# Patient Record
Sex: MALE | Race: WHITE | NOT HISPANIC OR LATINO | Employment: UNEMPLOYED
[De-identification: names, ages, dates, MRNs, and addresses within clinical notes are randomized per-mention and may not be internally consistent; named-entity substitution may affect disease eponyms.]

---

## 2018-04-13 ENCOUNTER — RECORDS - HEALTHEAST (OUTPATIENT)
Dept: ADMINISTRATIVE | Facility: OTHER | Age: 58
End: 2018-04-13

## 2018-05-31 ENCOUNTER — RECORDS - HEALTHEAST (OUTPATIENT)
Dept: ADMINISTRATIVE | Facility: OTHER | Age: 58
End: 2018-05-31

## 2018-06-13 ENCOUNTER — OFFICE VISIT - HEALTHEAST (OUTPATIENT)
Dept: FAMILY MEDICINE | Facility: CLINIC | Age: 58
End: 2018-06-13

## 2018-06-13 DIAGNOSIS — M54.50 CHRONIC LOW BACK PAIN: ICD-10-CM

## 2018-06-13 DIAGNOSIS — Z78.9 ALCOHOL USE: ICD-10-CM

## 2018-06-13 DIAGNOSIS — M25.551 HIP PAIN, RIGHT: ICD-10-CM

## 2018-06-13 DIAGNOSIS — Z87.891 PERSONAL HISTORY OF TOBACCO USE, PRESENTING HAZARDS TO HEALTH: ICD-10-CM

## 2018-06-13 DIAGNOSIS — Z12.11 SCREEN FOR COLON CANCER: ICD-10-CM

## 2018-06-13 DIAGNOSIS — M25.562 LEFT KNEE PAIN: ICD-10-CM

## 2018-06-13 DIAGNOSIS — Z83.3 FAMILY HISTORY OF DIABETES MELLITUS: ICD-10-CM

## 2018-06-13 DIAGNOSIS — G89.29 CHRONIC LOW BACK PAIN: ICD-10-CM

## 2018-06-13 DIAGNOSIS — Z71.85 IMMUNIZATION COUNSELING: ICD-10-CM

## 2018-06-13 ASSESSMENT — MIFFLIN-ST. JEOR: SCORE: 1794.68

## 2018-06-18 ENCOUNTER — COMMUNICATION - HEALTHEAST (OUTPATIENT)
Dept: FAMILY MEDICINE | Facility: CLINIC | Age: 58
End: 2018-06-18

## 2018-06-21 ENCOUNTER — AMBULATORY - HEALTHEAST (OUTPATIENT)
Dept: SCHEDULING | Facility: CLINIC | Age: 58
End: 2018-06-21

## 2018-06-21 ENCOUNTER — OFFICE VISIT - HEALTHEAST (OUTPATIENT)
Dept: FAMILY MEDICINE | Facility: CLINIC | Age: 58
End: 2018-06-21

## 2018-06-21 DIAGNOSIS — G89.29 CHRONIC PAIN OF LEFT KNEE: ICD-10-CM

## 2018-06-21 DIAGNOSIS — M79.662 PAIN OF LEFT LOWER LEG: ICD-10-CM

## 2018-06-21 DIAGNOSIS — R94.31 NONSPECIFIC ABNORMAL ELECTROCARDIOGRAM (ECG) (EKG): ICD-10-CM

## 2018-06-21 DIAGNOSIS — Z78.9 ALCOHOL USE: ICD-10-CM

## 2018-06-21 DIAGNOSIS — R00.0 TACHYCARDIA: ICD-10-CM

## 2018-06-21 DIAGNOSIS — M25.562 CHRONIC PAIN OF LEFT KNEE: ICD-10-CM

## 2018-06-21 DIAGNOSIS — M79.661 PAIN OF RIGHT LOWER LEG: ICD-10-CM

## 2018-06-21 DIAGNOSIS — M25.551 HIP PAIN, RIGHT: ICD-10-CM

## 2018-06-21 DIAGNOSIS — K14.8 TONGUE LESION: ICD-10-CM

## 2018-06-21 DIAGNOSIS — E78.5 HYPERLIPIDEMIA LDL GOAL <100: ICD-10-CM

## 2018-06-21 DIAGNOSIS — Z83.3 FAMILY HISTORY OF DIABETES MELLITUS: ICD-10-CM

## 2018-06-21 DIAGNOSIS — Z00.00 ROUTINE GENERAL MEDICAL EXAMINATION AT A HEALTH CARE FACILITY: ICD-10-CM

## 2018-06-21 DIAGNOSIS — Z87.891 PERSONAL HISTORY OF TOBACCO USE, PRESENTING HAZARDS TO HEALTH: ICD-10-CM

## 2018-06-21 DIAGNOSIS — E55.9 VITAMIN D DEFICIENCY: ICD-10-CM

## 2018-06-21 LAB
ALBUMIN SERPL-MCNC: 3.8 G/DL (ref 3.5–5)
ALP SERPL-CCNC: 145 U/L (ref 45–120)
ALT SERPL W P-5'-P-CCNC: 41 U/L (ref 0–45)
ANION GAP SERPL CALCULATED.3IONS-SCNC: 10 MMOL/L (ref 5–18)
AST SERPL W P-5'-P-CCNC: 47 U/L (ref 0–40)
ATRIAL RATE - MUSE: 94 BPM
BILIRUB SERPL-MCNC: 0.7 MG/DL (ref 0–1)
BUN SERPL-MCNC: 9 MG/DL (ref 8–22)
CALCIUM SERPL-MCNC: 9.6 MG/DL (ref 8.5–10.5)
CHLORIDE BLD-SCNC: 104 MMOL/L (ref 98–107)
CHOLEST SERPL-MCNC: 217 MG/DL
CO2 SERPL-SCNC: 26 MMOL/L (ref 22–31)
CREAT SERPL-MCNC: 0.67 MG/DL (ref 0.7–1.3)
DIASTOLIC BLOOD PRESSURE - MUSE: NORMAL MMHG
ERYTHROCYTE [DISTWIDTH] IN BLOOD BY AUTOMATED COUNT: 12.6 % (ref 11–14.5)
FASTING STATUS PATIENT QL REPORTED: YES
GFR SERPL CREATININE-BSD FRML MDRD: >60 ML/MIN/1.73M2
GLUCOSE BLD-MCNC: 86 MG/DL (ref 70–125)
HCT VFR BLD AUTO: 49.3 % (ref 40–54)
HDLC SERPL-MCNC: 34 MG/DL
HGB BLD-MCNC: 16.3 G/DL (ref 14–18)
INTERPRETATION ECG - MUSE: NORMAL
LDLC SERPL CALC-MCNC: 151 MG/DL
MCH RBC QN AUTO: 31.5 PG (ref 27–34)
MCHC RBC AUTO-ENTMCNC: 33 G/DL (ref 32–36)
MCV RBC AUTO: 95 FL (ref 80–100)
P AXIS - MUSE: 59 DEGREES
PLATELET # BLD AUTO: 250 THOU/UL (ref 140–440)
PMV BLD AUTO: 7.3 FL (ref 7–10)
POTASSIUM BLD-SCNC: 5.1 MMOL/L (ref 3.5–5)
PR INTERVAL - MUSE: 164 MS
PROT SERPL-MCNC: 6.9 G/DL (ref 6–8)
PSA SERPL-MCNC: 1.2 NG/ML (ref 0–3.5)
QRS DURATION - MUSE: 100 MS
QT - MUSE: 344 MS
QTC - MUSE: 430 MS
R AXIS - MUSE: -89 DEGREES
RBC # BLD AUTO: 5.17 MILL/UL (ref 4.4–6.2)
SODIUM SERPL-SCNC: 140 MMOL/L (ref 136–145)
SYSTOLIC BLOOD PRESSURE - MUSE: NORMAL MMHG
T AXIS - MUSE: 53 DEGREES
TRIGL SERPL-MCNC: 161 MG/DL
TSH SERPL DL<=0.005 MIU/L-ACNC: 1.97 UIU/ML (ref 0.3–5)
VENTRICULAR RATE- MUSE: 94 BPM
WBC: 6 THOU/UL (ref 4–11)

## 2018-06-21 ASSESSMENT — MIFFLIN-ST. JEOR: SCORE: 1764.63

## 2018-06-22 ENCOUNTER — RECORDS - HEALTHEAST (OUTPATIENT)
Dept: ADMINISTRATIVE | Facility: OTHER | Age: 58
End: 2018-06-22

## 2018-06-22 LAB — 25(OH)D3 SERPL-MCNC: 22.8 NG/ML (ref 30–80)

## 2018-06-26 ENCOUNTER — AMBULATORY - HEALTHEAST (OUTPATIENT)
Dept: LAB | Facility: CLINIC | Age: 58
End: 2018-06-26

## 2018-06-26 ENCOUNTER — COMMUNICATION - HEALTHEAST (OUTPATIENT)
Dept: FAMILY MEDICINE | Facility: CLINIC | Age: 58
End: 2018-06-26

## 2018-06-26 ENCOUNTER — HOSPITAL ENCOUNTER (OUTPATIENT)
Dept: ULTRASOUND IMAGING | Facility: CLINIC | Age: 58
Discharge: HOME | End: 2018-06-26
Attending: FAMILY MEDICINE

## 2018-06-26 DIAGNOSIS — M25.559 HIP PAIN: ICD-10-CM

## 2018-06-26 LAB
BASOPHILS # BLD AUTO: 0 THOU/UL (ref 0–0.2)
BASOPHILS NFR BLD AUTO: 1 % (ref 0–2)
C REACTIVE PROTEIN LHE: 0.7 MG/DL (ref 0–0.8)
EOSINOPHIL # BLD AUTO: 0.2 THOU/UL (ref 0–0.4)
EOSINOPHIL NFR BLD AUTO: 4 % (ref 0–6)
ERYTHROCYTE [DISTWIDTH] IN BLOOD BY AUTOMATED COUNT: 14.7 % (ref 11–14.5)
ERYTHROCYTE [SEDIMENTATION RATE] IN BLOOD BY WESTERGREN METHOD: 10 MM/HR (ref 0–15)
HCT VFR BLD AUTO: 48.9 % (ref 40–54)
HGB BLD-MCNC: 16.7 G/DL (ref 14–18)
LYMPHOCYTES # BLD AUTO: 1.7 THOU/UL (ref 0.8–4.4)
LYMPHOCYTES NFR BLD AUTO: 35 % (ref 20–40)
MCH RBC QN AUTO: 31.9 PG (ref 27–34)
MCHC RBC AUTO-ENTMCNC: 34.2 G/DL (ref 32–36)
MCV RBC AUTO: 93 FL (ref 80–100)
MONOCYTES # BLD AUTO: 0.4 THOU/UL (ref 0–0.9)
MONOCYTES NFR BLD AUTO: 8 % (ref 2–10)
NEUTROPHILS # BLD AUTO: 2.4 THOU/UL (ref 2–7.7)
NEUTROPHILS NFR BLD AUTO: 53 % (ref 50–70)
PLATELET # BLD AUTO: 268 THOU/UL (ref 140–440)
PMV BLD AUTO: 9.5 FL (ref 8.5–12.5)
RBC # BLD AUTO: 5.24 MILL/UL (ref 4.4–6.2)
WBC: 4.7 THOU/UL (ref 4–11)

## 2018-07-05 ENCOUNTER — COMMUNICATION - HEALTHEAST (OUTPATIENT)
Dept: FAMILY MEDICINE | Facility: CLINIC | Age: 58
End: 2018-07-05

## 2018-07-06 ENCOUNTER — COMMUNICATION - HEALTHEAST (OUTPATIENT)
Dept: FAMILY MEDICINE | Facility: CLINIC | Age: 58
End: 2018-07-06

## 2018-07-06 DIAGNOSIS — Z72.0 TOBACCO ABUSE: ICD-10-CM

## 2018-07-11 ENCOUNTER — OFFICE VISIT - HEALTHEAST (OUTPATIENT)
Dept: FAMILY MEDICINE | Facility: CLINIC | Age: 58
End: 2018-07-11

## 2018-07-11 ENCOUNTER — COMMUNICATION - HEALTHEAST (OUTPATIENT)
Dept: SCHEDULING | Facility: CLINIC | Age: 58
End: 2018-07-11

## 2018-07-11 DIAGNOSIS — L02.211 CUTANEOUS ABSCESS OF ABDOMINAL WALL: ICD-10-CM

## 2018-07-13 ENCOUNTER — OFFICE VISIT - HEALTHEAST (OUTPATIENT)
Dept: CARDIOLOGY | Facility: CLINIC | Age: 58
End: 2018-07-13

## 2018-07-13 DIAGNOSIS — E78.2 MIXED HYPERLIPIDEMIA: ICD-10-CM

## 2018-07-13 DIAGNOSIS — R94.31 NONSPECIFIC ABNORMAL ELECTROCARDIOGRAM (ECG) (EKG): ICD-10-CM

## 2018-07-13 DIAGNOSIS — Z01.818 PREOPERATIVE EXAMINATION: ICD-10-CM

## 2018-07-13 LAB — BACTERIA SPEC CULT: NO GROWTH

## 2018-07-13 ASSESSMENT — MIFFLIN-ST. JEOR: SCORE: 1791.84

## 2018-07-16 ENCOUNTER — OFFICE VISIT - HEALTHEAST (OUTPATIENT)
Dept: FAMILY MEDICINE | Facility: CLINIC | Age: 58
End: 2018-07-16

## 2018-07-16 DIAGNOSIS — E78.5 HYPERLIPIDEMIA LDL GOAL <100: ICD-10-CM

## 2018-07-16 DIAGNOSIS — G89.29 CHRONIC PAIN OF LEFT KNEE: ICD-10-CM

## 2018-07-16 DIAGNOSIS — M25.562 CHRONIC PAIN OF LEFT KNEE: ICD-10-CM

## 2018-07-16 DIAGNOSIS — M25.551 HIP PAIN, RIGHT: ICD-10-CM

## 2018-07-16 DIAGNOSIS — L02.211 CUTANEOUS ABSCESS OF ABDOMINAL WALL: ICD-10-CM

## 2018-09-04 ENCOUNTER — OFFICE VISIT - HEALTHEAST (OUTPATIENT)
Dept: FAMILY MEDICINE | Facility: CLINIC | Age: 58
End: 2018-09-04

## 2018-09-04 DIAGNOSIS — Z87.891 PERSONAL HISTORY OF TOBACCO USE, PRESENTING HAZARDS TO HEALTH: ICD-10-CM

## 2018-09-04 DIAGNOSIS — M25.562 CHRONIC PAIN OF LEFT KNEE: ICD-10-CM

## 2018-09-04 DIAGNOSIS — Z83.3 FAMILY HISTORY OF DIABETES MELLITUS: ICD-10-CM

## 2018-09-04 DIAGNOSIS — G89.29 CHRONIC PAIN OF LEFT KNEE: ICD-10-CM

## 2018-09-04 DIAGNOSIS — Z01.818 PREOP GENERAL PHYSICAL EXAM: ICD-10-CM

## 2018-09-04 DIAGNOSIS — M16.11 PRIMARY OSTEOARTHRITIS OF RIGHT HIP: ICD-10-CM

## 2018-09-04 LAB
ANION GAP SERPL CALCULATED.3IONS-SCNC: 9 MMOL/L (ref 5–18)
ATRIAL RATE - MUSE: 78 BPM
BUN SERPL-MCNC: 9 MG/DL (ref 8–22)
CALCIUM SERPL-MCNC: 9.9 MG/DL (ref 8.5–10.5)
CHLORIDE BLD-SCNC: 104 MMOL/L (ref 98–107)
CO2 SERPL-SCNC: 26 MMOL/L (ref 22–31)
CREAT SERPL-MCNC: 0.68 MG/DL (ref 0.7–1.3)
DIASTOLIC BLOOD PRESSURE - MUSE: NORMAL MMHG
ERYTHROCYTE [DISTWIDTH] IN BLOOD BY AUTOMATED COUNT: 14 % (ref 11–14.5)
GFR SERPL CREATININE-BSD FRML MDRD: >60 ML/MIN/1.73M2
GLUCOSE BLD-MCNC: 95 MG/DL (ref 70–125)
HCT VFR BLD AUTO: 43.3 % (ref 40–54)
HGB BLD-MCNC: 14.5 G/DL (ref 14–18)
INR PPP: 0.98 (ref 0.9–1.1)
INTERPRETATION ECG - MUSE: NORMAL
MCH RBC QN AUTO: 30.9 PG (ref 27–34)
MCHC RBC AUTO-ENTMCNC: 33.6 G/DL (ref 32–36)
MCV RBC AUTO: 92 FL (ref 80–100)
P AXIS - MUSE: 57 DEGREES
PLATELET # BLD AUTO: 242 THOU/UL (ref 140–440)
PMV BLD AUTO: 6.8 FL (ref 7–10)
POTASSIUM BLD-SCNC: 4.7 MMOL/L (ref 3.5–5)
PR INTERVAL - MUSE: 172 MS
QRS DURATION - MUSE: 112 MS
QT - MUSE: 374 MS
QTC - MUSE: 426 MS
R AXIS - MUSE: -63 DEGREES
RBC # BLD AUTO: 4.7 MILL/UL (ref 4.4–6.2)
SODIUM SERPL-SCNC: 139 MMOL/L (ref 136–145)
SYSTOLIC BLOOD PRESSURE - MUSE: NORMAL MMHG
T AXIS - MUSE: 47 DEGREES
VENTRICULAR RATE- MUSE: 78 BPM
WBC: 4.9 THOU/UL (ref 4–11)

## 2018-09-04 ASSESSMENT — MIFFLIN-ST. JEOR: SCORE: 1844.58

## 2018-09-13 ASSESSMENT — MIFFLIN-ST. JEOR: SCORE: 1844.58

## 2018-09-17 ENCOUNTER — ANESTHESIA - HEALTHEAST (OUTPATIENT)
Dept: SURGERY | Facility: CLINIC | Age: 58
End: 2018-09-17

## 2018-09-18 ENCOUNTER — SURGERY - HEALTHEAST (OUTPATIENT)
Dept: SURGERY | Facility: CLINIC | Age: 58
End: 2018-09-18

## 2018-09-18 ASSESSMENT — MIFFLIN-ST. JEOR
SCORE: 1804.04
SCORE: 1804.04

## 2018-09-25 ENCOUNTER — COMMUNICATION - HEALTHEAST (OUTPATIENT)
Dept: CARE COORDINATION | Facility: CLINIC | Age: 58
End: 2018-09-25

## 2018-11-08 ENCOUNTER — COMMUNICATION - HEALTHEAST (OUTPATIENT)
Dept: SCHEDULING | Facility: CLINIC | Age: 58
End: 2018-11-08

## 2018-11-27 ENCOUNTER — RECORDS - HEALTHEAST (OUTPATIENT)
Dept: ADMINISTRATIVE | Facility: OTHER | Age: 58
End: 2018-11-27

## 2018-11-30 ENCOUNTER — COMMUNICATION - HEALTHEAST (OUTPATIENT)
Dept: FAMILY MEDICINE | Facility: CLINIC | Age: 58
End: 2018-11-30

## 2018-11-30 DIAGNOSIS — G89.29 CHRONIC PAIN OF LEFT KNEE: ICD-10-CM

## 2018-11-30 DIAGNOSIS — M25.562 CHRONIC PAIN OF LEFT KNEE: ICD-10-CM

## 2018-11-30 DIAGNOSIS — M25.551 HIP PAIN, RIGHT: ICD-10-CM

## 2019-01-14 ENCOUNTER — OFFICE VISIT - HEALTHEAST (OUTPATIENT)
Dept: FAMILY MEDICINE | Facility: CLINIC | Age: 59
End: 2019-01-14

## 2019-01-14 DIAGNOSIS — M17.12 OSTEOARTHRITIS OF LEFT KNEE, UNSPECIFIED OSTEOARTHRITIS TYPE: ICD-10-CM

## 2019-01-14 DIAGNOSIS — Z72.0 TOBACCO USE: ICD-10-CM

## 2019-01-14 DIAGNOSIS — Z01.818 PREOP GENERAL PHYSICAL EXAM: ICD-10-CM

## 2019-01-14 DIAGNOSIS — M16.11 PRIMARY OSTEOARTHRITIS OF RIGHT HIP: ICD-10-CM

## 2019-01-14 DIAGNOSIS — E78.5 HYPERLIPIDEMIA LDL GOAL <100: ICD-10-CM

## 2019-01-14 DIAGNOSIS — Z83.3 FAMILY HISTORY OF DIABETES MELLITUS: ICD-10-CM

## 2019-01-14 DIAGNOSIS — Z78.9 ALCOHOL USE: ICD-10-CM

## 2019-01-14 LAB
ANION GAP SERPL CALCULATED.3IONS-SCNC: 11 MMOL/L (ref 5–18)
ATRIAL RATE - MUSE: 87 BPM
BUN SERPL-MCNC: 16 MG/DL (ref 8–22)
CALCIUM SERPL-MCNC: 9.7 MG/DL (ref 8.5–10.5)
CHLORIDE BLD-SCNC: 103 MMOL/L (ref 98–107)
CO2 SERPL-SCNC: 24 MMOL/L (ref 22–31)
CREAT SERPL-MCNC: 0.74 MG/DL (ref 0.7–1.3)
DIASTOLIC BLOOD PRESSURE - MUSE: NORMAL MMHG
ERYTHROCYTE [DISTWIDTH] IN BLOOD BY AUTOMATED COUNT: 12.9 % (ref 11–14.5)
GFR SERPL CREATININE-BSD FRML MDRD: >60 ML/MIN/1.73M2
GLUCOSE BLD-MCNC: 99 MG/DL (ref 70–125)
HCT VFR BLD AUTO: 42.3 % (ref 40–54)
HGB BLD-MCNC: 14 G/DL (ref 14–18)
INR PPP: 0.96 (ref 0.9–1.1)
INTERPRETATION ECG - MUSE: NORMAL
MCH RBC QN AUTO: 30.7 PG (ref 27–34)
MCHC RBC AUTO-ENTMCNC: 33.1 G/DL (ref 32–36)
MCV RBC AUTO: 93 FL (ref 80–100)
P AXIS - MUSE: 56 DEGREES
PLATELET # BLD AUTO: 278 THOU/UL (ref 140–440)
PMV BLD AUTO: 7 FL (ref 7–10)
POTASSIUM BLD-SCNC: 4.6 MMOL/L (ref 3.5–5)
PR INTERVAL - MUSE: 164 MS
QRS DURATION - MUSE: 104 MS
QT - MUSE: 356 MS
QTC - MUSE: 428 MS
R AXIS - MUSE: -78 DEGREES
RBC # BLD AUTO: 4.55 MILL/UL (ref 4.4–6.2)
SODIUM SERPL-SCNC: 138 MMOL/L (ref 136–145)
SYSTOLIC BLOOD PRESSURE - MUSE: NORMAL MMHG
T AXIS - MUSE: 54 DEGREES
VENTRICULAR RATE- MUSE: 87 BPM
WBC: 4.9 THOU/UL (ref 4–11)

## 2019-01-14 ASSESSMENT — MIFFLIN-ST. JEOR: SCORE: 1896.74

## 2019-02-04 ASSESSMENT — MIFFLIN-ST. JEOR: SCORE: 1896.74

## 2019-02-06 ENCOUNTER — ANESTHESIA - HEALTHEAST (OUTPATIENT)
Dept: SURGERY | Facility: CLINIC | Age: 59
End: 2019-02-06

## 2019-02-06 ENCOUNTER — SURGERY - HEALTHEAST (OUTPATIENT)
Dept: SURGERY | Facility: CLINIC | Age: 59
End: 2019-02-06

## 2019-02-06 ASSESSMENT — MIFFLIN-ST. JEOR
SCORE: 1864.98
SCORE: 1861.87

## 2019-02-12 ENCOUNTER — COMMUNICATION - HEALTHEAST (OUTPATIENT)
Dept: CARE COORDINATION | Facility: CLINIC | Age: 59
End: 2019-02-12

## 2019-02-18 ENCOUNTER — RECORDS - HEALTHEAST (OUTPATIENT)
Dept: ADMINISTRATIVE | Facility: OTHER | Age: 59
End: 2019-02-18

## 2019-02-19 ENCOUNTER — OFFICE VISIT - HEALTHEAST (OUTPATIENT)
Dept: FAMILY MEDICINE | Facility: CLINIC | Age: 59
End: 2019-02-19

## 2019-02-19 DIAGNOSIS — Z09 HOSPITAL DISCHARGE FOLLOW-UP: ICD-10-CM

## 2019-02-19 DIAGNOSIS — D64.9 ANEMIA, UNSPECIFIED TYPE: ICD-10-CM

## 2019-02-19 DIAGNOSIS — M17.12 PRIMARY OSTEOARTHRITIS OF LEFT KNEE: ICD-10-CM

## 2019-02-19 LAB
ERYTHROCYTE [DISTWIDTH] IN BLOOD BY AUTOMATED COUNT: 14.1 % (ref 11–14.5)
HCT VFR BLD AUTO: 30.6 % (ref 40–54)
HGB BLD-MCNC: 10.2 G/DL (ref 14–18)
MCH RBC QN AUTO: 30.3 PG (ref 27–34)
MCHC RBC AUTO-ENTMCNC: 33.3 G/DL (ref 32–36)
MCV RBC AUTO: 91 FL (ref 80–100)
PLATELET # BLD AUTO: 450 THOU/UL (ref 140–440)
PMV BLD AUTO: 6 FL (ref 7–10)
RBC # BLD AUTO: 3.36 MILL/UL (ref 4.4–6.2)
WBC: 6.8 THOU/UL (ref 4–11)

## 2019-02-19 ASSESSMENT — MIFFLIN-ST. JEOR: SCORE: 1892.2

## 2019-02-20 ENCOUNTER — COMMUNICATION - HEALTHEAST (OUTPATIENT)
Dept: FAMILY MEDICINE | Facility: CLINIC | Age: 59
End: 2019-02-20

## 2019-03-04 ENCOUNTER — RECORDS - HEALTHEAST (OUTPATIENT)
Dept: ADMINISTRATIVE | Facility: OTHER | Age: 59
End: 2019-03-04

## 2019-04-23 ENCOUNTER — COMMUNICATION - HEALTHEAST (OUTPATIENT)
Dept: FAMILY MEDICINE | Facility: CLINIC | Age: 59
End: 2019-04-23

## 2019-04-23 ENCOUNTER — RECORDS - HEALTHEAST (OUTPATIENT)
Dept: FAMILY MEDICINE | Facility: CLINIC | Age: 59
End: 2019-04-23

## 2019-04-23 DIAGNOSIS — M17.12 PRIMARY OSTEOARTHRITIS OF LEFT KNEE: ICD-10-CM

## 2019-09-10 ENCOUNTER — OFFICE VISIT - HEALTHEAST (OUTPATIENT)
Dept: FAMILY MEDICINE | Facility: CLINIC | Age: 59
End: 2019-09-10

## 2019-09-10 DIAGNOSIS — M16.12 PRIMARY OSTEOARTHRITIS OF LEFT HIP: ICD-10-CM

## 2019-09-10 DIAGNOSIS — Z87.891 PERSONAL HISTORY OF TOBACCO USE, PRESENTING HAZARDS TO HEALTH: ICD-10-CM

## 2019-09-10 DIAGNOSIS — Z83.3 FAMILY HISTORY OF DIABETES MELLITUS: ICD-10-CM

## 2019-09-10 DIAGNOSIS — Z01.818 PREOP GENERAL PHYSICAL EXAM: ICD-10-CM

## 2019-09-10 DIAGNOSIS — E78.5 HYPERLIPIDEMIA LDL GOAL <100: ICD-10-CM

## 2019-09-10 LAB
ALBUMIN SERPL-MCNC: 3.9 G/DL (ref 3.5–5)
ALP SERPL-CCNC: 131 U/L (ref 45–120)
ALT SERPL W P-5'-P-CCNC: 24 U/L (ref 0–45)
ANION GAP SERPL CALCULATED.3IONS-SCNC: 10 MMOL/L (ref 5–18)
AST SERPL W P-5'-P-CCNC: 19 U/L (ref 0–40)
ATRIAL RATE - MUSE: 89 BPM
BILIRUB SERPL-MCNC: 0.3 MG/DL (ref 0–1)
BUN SERPL-MCNC: 16 MG/DL (ref 8–22)
CALCIUM SERPL-MCNC: 9.7 MG/DL (ref 8.5–10.5)
CHLORIDE BLD-SCNC: 103 MMOL/L (ref 98–107)
CHOLEST SERPL-MCNC: 196 MG/DL
CO2 SERPL-SCNC: 26 MMOL/L (ref 22–31)
CREAT SERPL-MCNC: 0.73 MG/DL (ref 0.7–1.3)
DIASTOLIC BLOOD PRESSURE - MUSE: NORMAL MMHG
ERYTHROCYTE [DISTWIDTH] IN BLOOD BY AUTOMATED COUNT: 13.4 % (ref 11–14.5)
FASTING STATUS PATIENT QL REPORTED: NO
GFR SERPL CREATININE-BSD FRML MDRD: >60 ML/MIN/1.73M2
GLUCOSE BLD-MCNC: 96 MG/DL (ref 70–125)
HCT VFR BLD AUTO: 44.3 % (ref 40–54)
HDLC SERPL-MCNC: 35 MG/DL
HGB BLD-MCNC: 15.3 G/DL (ref 14–18)
INR PPP: 0.94 (ref 0.9–1.1)
INTERPRETATION ECG - MUSE: NORMAL
LDLC SERPL CALC-MCNC: 136 MG/DL
MCH RBC QN AUTO: 32.9 PG (ref 27–34)
MCHC RBC AUTO-ENTMCNC: 34.5 G/DL (ref 32–36)
MCV RBC AUTO: 95 FL (ref 80–100)
P AXIS - MUSE: 60 DEGREES
PLATELET # BLD AUTO: 315 THOU/UL (ref 140–440)
PMV BLD AUTO: 6.9 FL (ref 7–10)
POTASSIUM BLD-SCNC: 4.6 MMOL/L (ref 3.5–5)
PR INTERVAL - MUSE: 164 MS
PROT SERPL-MCNC: 6.9 G/DL (ref 6–8)
QRS DURATION - MUSE: 102 MS
QT - MUSE: 352 MS
QTC - MUSE: 428 MS
R AXIS - MUSE: -77 DEGREES
RBC # BLD AUTO: 4.64 MILL/UL (ref 4.4–6.2)
SODIUM SERPL-SCNC: 139 MMOL/L (ref 136–145)
SYSTOLIC BLOOD PRESSURE - MUSE: NORMAL MMHG
T AXIS - MUSE: 50 DEGREES
TRIGL SERPL-MCNC: 123 MG/DL
VENTRICULAR RATE- MUSE: 89 BPM
WBC: 4.9 THOU/UL (ref 4–11)

## 2019-09-10 ASSESSMENT — MIFFLIN-ST. JEOR: SCORE: 1855.91

## 2019-09-11 ENCOUNTER — COMMUNICATION - HEALTHEAST (OUTPATIENT)
Dept: FAMILY MEDICINE | Facility: CLINIC | Age: 59
End: 2019-09-11

## 2019-09-12 ASSESSMENT — MIFFLIN-ST. JEOR: SCORE: 1871.79

## 2019-09-14 ENCOUNTER — COMMUNICATION - HEALTHEAST (OUTPATIENT)
Dept: FAMILY MEDICINE | Facility: CLINIC | Age: 59
End: 2019-09-14

## 2019-09-14 DIAGNOSIS — M17.12 PRIMARY OSTEOARTHRITIS OF LEFT KNEE: ICD-10-CM

## 2019-10-01 ASSESSMENT — MIFFLIN-ST. JEOR: SCORE: 1840.04

## 2019-10-02 ENCOUNTER — ANESTHESIA - HEALTHEAST (OUTPATIENT)
Dept: SURGERY | Facility: CLINIC | Age: 59
End: 2019-10-02

## 2019-10-02 ENCOUNTER — SURGERY - HEALTHEAST (OUTPATIENT)
Dept: SURGERY | Facility: CLINIC | Age: 59
End: 2019-10-02

## 2019-10-02 ASSESSMENT — MIFFLIN-ST. JEOR: SCORE: 1818.72

## 2019-10-03 ASSESSMENT — MIFFLIN-ST. JEOR: SCORE: 1861.36

## 2019-10-08 ENCOUNTER — COMMUNICATION - HEALTHEAST (OUTPATIENT)
Dept: CARE COORDINATION | Facility: CLINIC | Age: 59
End: 2019-10-08

## 2019-10-11 ENCOUNTER — COMMUNICATION - HEALTHEAST (OUTPATIENT)
Dept: FAMILY MEDICINE | Facility: CLINIC | Age: 59
End: 2019-10-11

## 2019-10-11 DIAGNOSIS — M17.12 PRIMARY OSTEOARTHRITIS OF LEFT KNEE: ICD-10-CM

## 2019-10-14 ENCOUNTER — OFFICE VISIT - HEALTHEAST (OUTPATIENT)
Dept: FAMILY MEDICINE | Facility: CLINIC | Age: 59
End: 2019-10-14

## 2019-10-14 DIAGNOSIS — Z96.642 HISTORY OF LEFT HIP REPLACEMENT: ICD-10-CM

## 2019-10-14 DIAGNOSIS — Z09 HOSPITAL DISCHARGE FOLLOW-UP: ICD-10-CM

## 2019-10-14 DIAGNOSIS — H53.8 BLURRY VISION, BILATERAL: ICD-10-CM

## 2019-10-15 ENCOUNTER — RECORDS - HEALTHEAST (OUTPATIENT)
Dept: ADMINISTRATIVE | Facility: OTHER | Age: 59
End: 2019-10-15

## 2019-10-21 ENCOUNTER — RECORDS - HEALTHEAST (OUTPATIENT)
Dept: ADMINISTRATIVE | Facility: OTHER | Age: 59
End: 2019-10-21

## 2019-10-29 ENCOUNTER — RECORDS - HEALTHEAST (OUTPATIENT)
Dept: ADMINISTRATIVE | Facility: OTHER | Age: 59
End: 2019-10-29

## 2019-10-31 ENCOUNTER — RECORDS - HEALTHEAST (OUTPATIENT)
Dept: ADMINISTRATIVE | Facility: OTHER | Age: 59
End: 2019-10-31

## 2019-11-04 ENCOUNTER — RECORDS - HEALTHEAST (OUTPATIENT)
Dept: ADMINISTRATIVE | Facility: OTHER | Age: 59
End: 2019-11-04

## 2019-11-13 ENCOUNTER — OFFICE VISIT - HEALTHEAST (OUTPATIENT)
Dept: FAMILY MEDICINE | Facility: CLINIC | Age: 59
End: 2019-11-13

## 2019-11-13 DIAGNOSIS — M75.102 TEAR OF LEFT ROTATOR CUFF, UNSPECIFIED TEAR EXTENT, UNSPECIFIED WHETHER TRAUMATIC: ICD-10-CM

## 2019-11-13 DIAGNOSIS — Z01.818 PREOP GENERAL PHYSICAL EXAM: ICD-10-CM

## 2019-11-13 DIAGNOSIS — M17.12 PRIMARY OSTEOARTHRITIS OF LEFT KNEE: ICD-10-CM

## 2019-11-13 LAB
ANION GAP SERPL CALCULATED.3IONS-SCNC: 9 MMOL/L (ref 5–18)
ATRIAL RATE - MUSE: 97 BPM
BUN SERPL-MCNC: 13 MG/DL (ref 8–22)
CALCIUM SERPL-MCNC: 8.9 MG/DL (ref 8.5–10.5)
CHLORIDE BLD-SCNC: 105 MMOL/L (ref 98–107)
CO2 SERPL-SCNC: 24 MMOL/L (ref 22–31)
CREAT SERPL-MCNC: 0.72 MG/DL (ref 0.7–1.3)
DIASTOLIC BLOOD PRESSURE - MUSE: NORMAL
ERYTHROCYTE [DISTWIDTH] IN BLOOD BY AUTOMATED COUNT: 13.3 % (ref 11–14.5)
GFR SERPL CREATININE-BSD FRML MDRD: >60 ML/MIN/1.73M2
GLUCOSE BLD-MCNC: 88 MG/DL (ref 70–125)
HCT VFR BLD AUTO: 37.6 % (ref 40–54)
HGB BLD-MCNC: 12.8 G/DL (ref 14–18)
INTERPRETATION ECG - MUSE: NORMAL
MCH RBC QN AUTO: 32.5 PG (ref 27–34)
MCHC RBC AUTO-ENTMCNC: 34.1 G/DL (ref 32–36)
MCV RBC AUTO: 95 FL (ref 80–100)
P AXIS - MUSE: 65 DEGREES
PLATELET # BLD AUTO: 300 THOU/UL (ref 140–440)
PMV BLD AUTO: 6.5 FL (ref 7–10)
POTASSIUM BLD-SCNC: 4 MMOL/L (ref 3.5–5)
PR INTERVAL - MUSE: 172 MS
QRS DURATION - MUSE: 108 MS
QT - MUSE: 348 MS
QTC - MUSE: 441 MS
R AXIS - MUSE: -74 DEGREES
RBC # BLD AUTO: 3.95 MILL/UL (ref 4.4–6.2)
SODIUM SERPL-SCNC: 138 MMOL/L (ref 136–145)
SYSTOLIC BLOOD PRESSURE - MUSE: NORMAL
T AXIS - MUSE: 59 DEGREES
VENTRICULAR RATE- MUSE: 97 BPM
WBC: 4.5 THOU/UL (ref 4–11)

## 2019-11-13 ASSESSMENT — MIFFLIN-ST. JEOR: SCORE: 1921.69

## 2019-11-14 ENCOUNTER — COMMUNICATION - HEALTHEAST (OUTPATIENT)
Dept: FAMILY MEDICINE | Facility: CLINIC | Age: 59
End: 2019-11-14

## 2019-11-22 ENCOUNTER — RECORDS - HEALTHEAST (OUTPATIENT)
Dept: ADMINISTRATIVE | Facility: OTHER | Age: 59
End: 2019-11-22

## 2019-12-04 ENCOUNTER — COMMUNICATION - HEALTHEAST (OUTPATIENT)
Dept: FAMILY MEDICINE | Facility: CLINIC | Age: 59
End: 2019-12-04

## 2019-12-04 DIAGNOSIS — E56.9 VITAMIN DEFICIENCY: ICD-10-CM

## 2019-12-09 ENCOUNTER — RECORDS - HEALTHEAST (OUTPATIENT)
Dept: ADMINISTRATIVE | Facility: OTHER | Age: 59
End: 2019-12-09

## 2019-12-14 ENCOUNTER — COMMUNICATION - HEALTHEAST (OUTPATIENT)
Dept: FAMILY MEDICINE | Facility: CLINIC | Age: 59
End: 2019-12-14

## 2019-12-14 DIAGNOSIS — M17.12 PRIMARY OSTEOARTHRITIS OF LEFT KNEE: ICD-10-CM

## 2020-01-07 ENCOUNTER — RECORDS - HEALTHEAST (OUTPATIENT)
Dept: ADMINISTRATIVE | Facility: OTHER | Age: 60
End: 2020-01-07

## 2020-02-03 ENCOUNTER — RECORDS - HEALTHEAST (OUTPATIENT)
Dept: ADMINISTRATIVE | Facility: OTHER | Age: 60
End: 2020-02-03

## 2020-03-12 ENCOUNTER — RECORDS - HEALTHEAST (OUTPATIENT)
Dept: ADMINISTRATIVE | Facility: OTHER | Age: 60
End: 2020-03-12

## 2020-03-16 ENCOUNTER — RECORDS - HEALTHEAST (OUTPATIENT)
Dept: ADMINISTRATIVE | Facility: OTHER | Age: 60
End: 2020-03-16

## 2020-05-11 ENCOUNTER — RECORDS - HEALTHEAST (OUTPATIENT)
Dept: ADMINISTRATIVE | Facility: OTHER | Age: 60
End: 2020-05-11

## 2020-08-14 ENCOUNTER — COMMUNICATION - HEALTHEAST (OUTPATIENT)
Dept: FAMILY MEDICINE | Facility: CLINIC | Age: 60
End: 2020-08-14

## 2020-08-14 DIAGNOSIS — E56.9 VITAMIN DEFICIENCY: ICD-10-CM

## 2020-08-14 DIAGNOSIS — M17.12 PRIMARY OSTEOARTHRITIS OF LEFT KNEE: ICD-10-CM

## 2020-08-24 ENCOUNTER — RECORDS - HEALTHEAST (OUTPATIENT)
Dept: ADMINISTRATIVE | Facility: OTHER | Age: 60
End: 2020-08-24

## 2020-08-31 ENCOUNTER — COMMUNICATION - HEALTHEAST (OUTPATIENT)
Dept: FAMILY MEDICINE | Facility: CLINIC | Age: 60
End: 2020-08-31

## 2020-09-18 ENCOUNTER — OFFICE VISIT - HEALTHEAST (OUTPATIENT)
Dept: FAMILY MEDICINE | Facility: CLINIC | Age: 60
End: 2020-09-18

## 2020-09-18 ENCOUNTER — COMMUNICATION - HEALTHEAST (OUTPATIENT)
Dept: FAMILY MEDICINE | Facility: CLINIC | Age: 60
End: 2020-09-18

## 2020-09-18 DIAGNOSIS — M17.12 PRIMARY OSTEOARTHRITIS OF LEFT KNEE: ICD-10-CM

## 2020-09-18 DIAGNOSIS — M17.10 ARTHRITIS OF KNEE: ICD-10-CM

## 2020-09-18 DIAGNOSIS — Z01.818 PREOP EXAMINATION: ICD-10-CM

## 2020-09-18 LAB
ATRIAL RATE - MUSE: 90 BPM
DIASTOLIC BLOOD PRESSURE - MUSE: NORMAL
HGB BLD-MCNC: 14.9 G/DL (ref 14–18)
INTERPRETATION ECG - MUSE: NORMAL
P AXIS - MUSE: 63 DEGREES
POTASSIUM BLD-SCNC: 4.2 MMOL/L (ref 3.5–5)
PR INTERVAL - MUSE: 164 MS
QRS DURATION - MUSE: 108 MS
QT - MUSE: 354 MS
QTC - MUSE: 433 MS
R AXIS - MUSE: -84 DEGREES
SYSTOLIC BLOOD PRESSURE - MUSE: NORMAL
T AXIS - MUSE: 56 DEGREES
VENTRICULAR RATE- MUSE: 90 BPM

## 2020-09-18 ASSESSMENT — MIFFLIN-ST. JEOR: SCORE: 1844

## 2020-09-22 ENCOUNTER — COMMUNICATION - HEALTHEAST (OUTPATIENT)
Dept: FAMILY MEDICINE | Facility: CLINIC | Age: 60
End: 2020-09-22

## 2020-10-05 ENCOUNTER — RECORDS - HEALTHEAST (OUTPATIENT)
Dept: LAB | Facility: HOSPITAL | Age: 60
End: 2020-10-05

## 2020-10-05 LAB
ANION GAP SERPL CALCULATED.3IONS-SCNC: 8 MMOL/L (ref 5–18)
BASOPHILS # BLD AUTO: 0.1 THOU/UL (ref 0–0.2)
BASOPHILS NFR BLD AUTO: 1 % (ref 0–2)
BUN SERPL-MCNC: 15 MG/DL (ref 8–22)
CALCIUM SERPL-MCNC: 9.6 MG/DL (ref 8.5–10.5)
CHLORIDE BLD-SCNC: 102 MMOL/L (ref 98–107)
CO2 SERPL-SCNC: 30 MMOL/L (ref 22–31)
CREAT SERPL-MCNC: 0.7 MG/DL (ref 0.7–1.3)
EOSINOPHIL # BLD AUTO: 0.4 THOU/UL (ref 0–0.4)
EOSINOPHIL NFR BLD AUTO: 5 % (ref 0–6)
ERYTHROCYTE [DISTWIDTH] IN BLOOD BY AUTOMATED COUNT: 13.9 % (ref 11–14.5)
GFR SERPL CREATININE-BSD FRML MDRD: >60 ML/MIN/1.73M2
GLUCOSE BLD-MCNC: 86 MG/DL (ref 70–125)
HCT VFR BLD AUTO: 45.5 % (ref 40–54)
HGB BLD-MCNC: 15.4 G/DL (ref 14–18)
IMM GRANULOCYTES # BLD: 0 THOU/UL
IMM GRANULOCYTES NFR BLD: 0 %
LYMPHOCYTES # BLD AUTO: 2 THOU/UL (ref 0.8–4.4)
LYMPHOCYTES NFR BLD AUTO: 27 % (ref 20–40)
MCH RBC QN AUTO: 32.8 PG (ref 27–34)
MCHC RBC AUTO-ENTMCNC: 33.8 G/DL (ref 32–36)
MCV RBC AUTO: 97 FL (ref 80–100)
MONOCYTES # BLD AUTO: 0.5 THOU/UL (ref 0–0.9)
MONOCYTES NFR BLD AUTO: 7 % (ref 2–10)
NEUTROPHILS # BLD AUTO: 4.5 THOU/UL (ref 2–7.7)
NEUTROPHILS NFR BLD AUTO: 60 % (ref 50–70)
PLATELET # BLD AUTO: 290 THOU/UL (ref 140–440)
PMV BLD AUTO: 8.8 FL (ref 8.5–12.5)
POTASSIUM BLD-SCNC: 4.4 MMOL/L (ref 3.5–5)
RBC # BLD AUTO: 4.7 MILL/UL (ref 4.4–6.2)
SODIUM SERPL-SCNC: 140 MMOL/L (ref 136–145)
WBC: 7.4 THOU/UL (ref 4–11)

## 2020-10-07 ENCOUNTER — AMBULATORY - HEALTHEAST (OUTPATIENT)
Dept: SURGERY | Facility: CLINIC | Age: 60
End: 2020-10-07

## 2020-10-07 ENCOUNTER — COMMUNICATION - HEALTHEAST (OUTPATIENT)
Dept: FAMILY MEDICINE | Facility: CLINIC | Age: 60
End: 2020-10-07

## 2020-10-07 DIAGNOSIS — Z11.59 ENCOUNTER FOR SCREENING FOR OTHER VIRAL DISEASES: ICD-10-CM

## 2020-10-08 ENCOUNTER — COMMUNICATION - HEALTHEAST (OUTPATIENT)
Dept: FAMILY MEDICINE | Facility: CLINIC | Age: 60
End: 2020-10-08

## 2020-10-15 ENCOUNTER — AMBULATORY - HEALTHEAST (OUTPATIENT)
Dept: SURGERY | Facility: CLINIC | Age: 60
End: 2020-10-15

## 2020-10-15 ENCOUNTER — COMMUNICATION - HEALTHEAST (OUTPATIENT)
Dept: FAMILY MEDICINE | Facility: CLINIC | Age: 60
End: 2020-10-15

## 2020-10-15 DIAGNOSIS — Z11.59 ENCOUNTER FOR SCREENING FOR OTHER VIRAL DISEASES: ICD-10-CM

## 2020-10-15 ASSESSMENT — MIFFLIN-ST. JEOR: SCORE: 1844

## 2020-10-31 ENCOUNTER — COMMUNICATION - HEALTHEAST (OUTPATIENT)
Dept: FAMILY MEDICINE | Facility: CLINIC | Age: 60
End: 2020-10-31

## 2020-10-31 DIAGNOSIS — M17.12 PRIMARY OSTEOARTHRITIS OF LEFT KNEE: ICD-10-CM

## 2020-11-02 ENCOUNTER — AMBULATORY - HEALTHEAST (OUTPATIENT)
Dept: LAB | Facility: CLINIC | Age: 60
End: 2020-11-02

## 2020-11-02 ENCOUNTER — OFFICE VISIT - HEALTHEAST (OUTPATIENT)
Dept: FAMILY MEDICINE | Facility: CLINIC | Age: 60
End: 2020-11-02

## 2020-11-02 DIAGNOSIS — Z11.59 ENCOUNTER FOR SCREENING FOR OTHER VIRAL DISEASES: ICD-10-CM

## 2020-11-02 DIAGNOSIS — Z87.891 PERSONAL HISTORY OF TOBACCO USE, PRESENTING HAZARDS TO HEALTH: ICD-10-CM

## 2020-11-02 DIAGNOSIS — M17.11 PRIMARY OSTEOARTHRITIS OF RIGHT KNEE: ICD-10-CM

## 2020-11-02 DIAGNOSIS — Z01.818 PREOP EXAMINATION: ICD-10-CM

## 2020-11-02 LAB
ANION GAP SERPL CALCULATED.3IONS-SCNC: 10 MMOL/L (ref 5–18)
BASOPHILS # BLD AUTO: 0.1 THOU/UL (ref 0–0.2)
BASOPHILS NFR BLD AUTO: 1 % (ref 0–2)
BUN SERPL-MCNC: 15 MG/DL (ref 8–22)
CALCIUM SERPL-MCNC: 9.6 MG/DL (ref 8.5–10.5)
CHLORIDE BLD-SCNC: 102 MMOL/L (ref 98–107)
CO2 SERPL-SCNC: 27 MMOL/L (ref 22–31)
CREAT SERPL-MCNC: 0.74 MG/DL (ref 0.7–1.3)
EOSINOPHIL # BLD AUTO: 0.4 THOU/UL (ref 0–0.4)
EOSINOPHIL NFR BLD AUTO: 6 % (ref 0–6)
ERYTHROCYTE [DISTWIDTH] IN BLOOD BY AUTOMATED COUNT: 11 % (ref 11–14.5)
GFR SERPL CREATININE-BSD FRML MDRD: >60 ML/MIN/1.73M2
GLUCOSE BLD-MCNC: 95 MG/DL (ref 70–125)
HCT VFR BLD AUTO: 45.7 % (ref 40–54)
HGB BLD-MCNC: 15.3 G/DL (ref 14–18)
LYMPHOCYTES # BLD AUTO: 2.2 THOU/UL (ref 0.8–4.4)
LYMPHOCYTES NFR BLD AUTO: 36 % (ref 20–40)
MCH RBC QN AUTO: 33.1 PG (ref 27–34)
MCHC RBC AUTO-ENTMCNC: 33.5 G/DL (ref 32–36)
MCV RBC AUTO: 99 FL (ref 80–100)
MONOCYTES # BLD AUTO: 0.5 THOU/UL (ref 0–0.9)
MONOCYTES NFR BLD AUTO: 9 % (ref 2–10)
NEUTROPHILS # BLD AUTO: 2.9 THOU/UL (ref 2–7.7)
NEUTROPHILS NFR BLD AUTO: 48 % (ref 50–70)
PLATELET # BLD AUTO: 266 THOU/UL (ref 140–440)
PMV BLD AUTO: 6.8 FL (ref 7–10)
POTASSIUM BLD-SCNC: 4.5 MMOL/L (ref 3.5–5)
RBC # BLD AUTO: 4.63 MILL/UL (ref 4.4–6.2)
SODIUM SERPL-SCNC: 139 MMOL/L (ref 136–145)
WBC: 6 THOU/UL (ref 4–11)

## 2020-11-02 ASSESSMENT — MIFFLIN-ST. JEOR: SCORE: 1835.51

## 2020-11-04 ENCOUNTER — ANESTHESIA - HEALTHEAST (OUTPATIENT)
Dept: SURGERY | Facility: CLINIC | Age: 60
End: 2020-11-04

## 2020-11-04 ENCOUNTER — COMMUNICATION - HEALTHEAST (OUTPATIENT)
Dept: SCHEDULING | Facility: CLINIC | Age: 60
End: 2020-11-04

## 2020-11-04 ENCOUNTER — SURGERY - HEALTHEAST (OUTPATIENT)
Dept: SURGERY | Facility: CLINIC | Age: 60
End: 2020-11-04

## 2020-11-04 ASSESSMENT — MIFFLIN-ST. JEOR
SCORE: 1814.97
SCORE: 1808.29

## 2020-11-13 ENCOUNTER — RECORDS - HEALTHEAST (OUTPATIENT)
Dept: ADMINISTRATIVE | Facility: OTHER | Age: 60
End: 2020-11-13

## 2020-12-02 ENCOUNTER — RECORDS - HEALTHEAST (OUTPATIENT)
Dept: ADMINISTRATIVE | Facility: OTHER | Age: 60
End: 2020-12-02

## 2020-12-08 ENCOUNTER — COMMUNICATION - HEALTHEAST (OUTPATIENT)
Dept: FAMILY MEDICINE | Facility: CLINIC | Age: 60
End: 2020-12-08

## 2020-12-08 DIAGNOSIS — M17.12 PRIMARY OSTEOARTHRITIS OF LEFT KNEE: ICD-10-CM

## 2021-01-09 ENCOUNTER — COMMUNICATION - HEALTHEAST (OUTPATIENT)
Dept: FAMILY MEDICINE | Facility: CLINIC | Age: 61
End: 2021-01-09

## 2021-01-09 DIAGNOSIS — M17.12 PRIMARY OSTEOARTHRITIS OF LEFT KNEE: ICD-10-CM

## 2021-01-10 RX ORDER — IBUPROFEN 800 MG/1
TABLET, FILM COATED ORAL
Qty: 60 TABLET | Refills: 0 | Status: SHIPPED | OUTPATIENT
Start: 2021-01-10 | End: 2021-08-27

## 2021-03-08 ENCOUNTER — RECORDS - HEALTHEAST (OUTPATIENT)
Dept: ADMINISTRATIVE | Facility: OTHER | Age: 61
End: 2021-03-08

## 2021-03-15 ENCOUNTER — OFFICE VISIT - HEALTHEAST (OUTPATIENT)
Dept: FAMILY MEDICINE | Facility: CLINIC | Age: 61
End: 2021-03-15

## 2021-03-15 DIAGNOSIS — B35.1 ONYCHOMYCOSIS: ICD-10-CM

## 2021-03-15 DIAGNOSIS — R20.0 NUMBNESS AND TINGLING OF BOTH FEET: ICD-10-CM

## 2021-03-15 DIAGNOSIS — R35.1 BENIGN PROSTATIC HYPERPLASIA WITH NOCTURIA: ICD-10-CM

## 2021-03-15 DIAGNOSIS — R20.2 NUMBNESS AND TINGLING OF BOTH FEET: ICD-10-CM

## 2021-03-15 DIAGNOSIS — N40.1 BENIGN PROSTATIC HYPERPLASIA WITH NOCTURIA: ICD-10-CM

## 2021-03-15 LAB
ALBUMIN SERPL-MCNC: 4.2 G/DL (ref 3.5–5)
ALP SERPL-CCNC: 138 U/L (ref 45–120)
ALT SERPL W P-5'-P-CCNC: 41 U/L (ref 0–45)
ANION GAP SERPL CALCULATED.3IONS-SCNC: 12 MMOL/L (ref 5–18)
AST SERPL W P-5'-P-CCNC: 35 U/L (ref 0–40)
BILIRUB SERPL-MCNC: 0.4 MG/DL (ref 0–1)
BUN SERPL-MCNC: 11 MG/DL (ref 8–22)
CALCIUM SERPL-MCNC: 9.6 MG/DL (ref 8.5–10.5)
CHLORIDE BLD-SCNC: 100 MMOL/L (ref 98–107)
CO2 SERPL-SCNC: 27 MMOL/L (ref 22–31)
CREAT SERPL-MCNC: 0.72 MG/DL (ref 0.7–1.3)
GFR SERPL CREATININE-BSD FRML MDRD: >60 ML/MIN/1.73M2
GLUCOSE BLD-MCNC: 93 MG/DL (ref 70–125)
POTASSIUM BLD-SCNC: 4.7 MMOL/L (ref 3.5–5)
PROT SERPL-MCNC: 7.3 G/DL (ref 6–8)
PSA SERPL-MCNC: 1.1 NG/ML (ref 0–3.5)
SODIUM SERPL-SCNC: 139 MMOL/L (ref 136–145)

## 2021-03-15 RX ORDER — GABAPENTIN 100 MG/1
100-300 CAPSULE ORAL 2 TIMES DAILY PRN
Qty: 90 CAPSULE | Refills: 1 | Status: SHIPPED | OUTPATIENT
Start: 2021-03-15 | End: 2022-08-15

## 2021-03-19 ENCOUNTER — COMMUNICATION - HEALTHEAST (OUTPATIENT)
Dept: FAMILY MEDICINE | Facility: CLINIC | Age: 61
End: 2021-03-19

## 2021-03-19 DIAGNOSIS — R35.0 BENIGN PROSTATIC HYPERPLASIA WITH URINARY FREQUENCY: ICD-10-CM

## 2021-03-19 DIAGNOSIS — N40.1 BENIGN PROSTATIC HYPERPLASIA WITH URINARY FREQUENCY: ICD-10-CM

## 2021-03-24 RX ORDER — TAMSULOSIN HYDROCHLORIDE 0.4 MG/1
0.4 CAPSULE ORAL
Qty: 30 CAPSULE | Refills: 3 | Status: SHIPPED | OUTPATIENT
Start: 2021-03-24 | End: 2022-08-15

## 2021-05-28 NOTE — TELEPHONE ENCOUNTER
Medication Request  Medication name: Ibuprofen 800 mg  Pharmacy Name and Location: [facility]  Reason for request: patient requesting a refill but medication is not on current medication list.  When did you use medication last?:  This morning  Patient offered appointment:  patient declined  Okay to leave a detailed message: yes

## 2021-06-01 VITALS — BODY MASS INDEX: 27.61 KG/M2 | WEIGHT: 205 LBS

## 2021-06-01 VITALS — WEIGHT: 203 LBS | BODY MASS INDEX: 26.9 KG/M2 | HEIGHT: 73 IN

## 2021-06-01 VITALS — WEIGHT: 205 LBS | BODY MASS INDEX: 27.77 KG/M2 | HEIGHT: 72 IN

## 2021-06-01 VITALS — WEIGHT: 199 LBS | BODY MASS INDEX: 26.95 KG/M2 | HEIGHT: 72 IN

## 2021-06-01 VITALS — WEIGHT: 200 LBS | BODY MASS INDEX: 26.94 KG/M2

## 2021-06-01 NOTE — ANESTHESIA POSTPROCEDURE EVALUATION
Patient: Wm Gaines  LEFT DIRECT ANTERIOR TOTAL HIP ARTHROPLASTY  Anesthesia type: spinal    Patient location: PACU  Last vitals:   Vitals Value Taken Time   /68 10/2/2019  1:10 PM   Temp 36.7  C (98  F) 10/2/2019 12:10 PM   Pulse 87 10/2/2019  1:12 PM   Resp 37 10/2/2019  1:12 PM   SpO2 97 % 10/2/2019  1:12 PM   Vitals shown include unvalidated device data.  Post vital signs: stable  Level of consciousness: awake and responds to simple questions  Post-anesthesia pain: pain controlled  Post-anesthesia nausea and vomiting: no  Pulmonary: unassisted, return to baseline  Cardiovascular: stable and blood pressure at baseline  Hydration: adequate  Anesthetic events: no    QCDR Measures:  ASA# 11 - Deena-op Cardiac Arrest: ASA11B - Patient did NOT experience unanticipated cardiac arrest  ASA# 12 - Deena-op Mortality Rate: ASA12B - Patient did NOT die  ASA# 13 - PACU Re-Intubation Rate: NA - No ETT / LMA used for case  ASA# 10 - Composite Anes Safety: ASA10A - No serious adverse event    Additional Notes:  Recovery as anticipated from spinal anesthetic.

## 2021-06-01 NOTE — ANESTHESIA PROCEDURE NOTES
Spinal Block    Patient location during procedure: OR  Start time: 10/2/2019 9:49 AM  End time: 10/2/2019 9:53 AM  Reason for block: primary anesthetic    Staffing:  Performing  Anesthesiologist: Alcon Orozco MD    Preanesthetic Checklist  Completed: patient identified, risks, benefits, and alternatives discussed, timeout performed, consent obtained, airway assessed, oxygen available, suction available, emergency drugs available and hand hygiene performed  Spinal Block  Patient position: sitting  Prep: ChloraPrep  Patient monitoring: heart rate, cardiac monitor, continuous pulse ox and blood pressure  Approach: midline  Location: L2-3  Injection technique: single-shot  Needle type: pencil-tip   Needle gauge: 24 G

## 2021-06-01 NOTE — ANESTHESIA CARE TRANSFER NOTE
Last vitals:   Vitals:    10/02/19 1157   BP: 91/55   Pulse: 100   Resp: 16   Temp: 36.9  C (98.5  F)   SpO2: 100%     Patient's level of consciousness is awake  Spontaneous respirations: yes  Maintains airway independently: yes  Dentition unchanged: yes  Oropharynx: oropharynx clear of all foreign objects    QCDR Measures:  ASA# 20 - Surgical Safety Checklist: WHO surgical safety checklist completed prior to induction    PQRS# 430 - Adult PONV Prevention: 4558F - Pt received => 2 anti-emetic agents (different classes) preop & intraop  ASA# 8 - Peds PONV Prevention: NA - Not pediatric patient, not GA or 2 or more risk factors NOT present  PQRS# 424 - Deena-op Temp Management: 4559F - At least one body temp DOCUMENTED => 35.5C or 95.9F within required timeframe  PQRS# 426 - PACU Transfer Protocol: - Transfer of care checklist used  ASA# 14 - Acute Post-op Pain: ASA14B - Patient did NOT experience pain >= 7 out of 10

## 2021-06-01 NOTE — TELEPHONE ENCOUNTER
Called and spoke to pt regarding results. Pt understood and has no questions at this time. Labs faxed to [facility] operating room at [phone]       ----- Message from Addy Ivy MD sent at 9/11/2019  1:47 PM CDT -----  Please contact this patient, let them know that the blood tests look good the liver kidney electrolytes and blood sugar were all normal.    The cholesterol level was better the total was down to 196 with a good cholesterol 35 and the bad cholesterol at 136, normal is less than 130    Continue to watch diet decreasing fats in the diet fried food red meat cheese etc.    Also please fax labs to [facility] operating room at [phone]

## 2021-06-01 NOTE — PROGRESS NOTES
Preoperative Exam    Scheduled Procedure: Left Hip  Surgery Date:  10/2/2019  Surgery Location: [facility], fax [phone]    Surgeon:  Dr. Evans    Assessment/Plan:     1. Preop general physical exam  XR Chest 2 Views    Electrocardiogram Perform - Clinic    Comprehensive Metabolic Panel    INR    HM2(CBC w/o Differential)   2. Primary osteoarthritis of left hip     3. Family history of diabetes mellitus     4. Hyperlipidemia LDL goal <100  Comprehensive Metabolic Panel    Lipid Cascade RANDOM   5. Personal history of tobacco use, presenting hazards to health         Surgical Procedure Risk: Low (reported cardiac risk generally < 1%)  Have you had prior anesthesia?: No  Have you or any family members had a previous anesthesia reaction:  No  Do you or any family members have a history of a clotting or bleeding disorder?: No  Cardiac Risk Assessment: no increased risk for major cardiac complications    APPROVAL GIVEN to proceed with proposed procedure, without further diagnostic evaluation    Patient will hold his ibuprofen 3 weeks prior to the surgery.  He will use Tylenol.    Functional Status: Independent  Patient plans to recover at home with family.     Subjective:      Wm Ganies is a 58 y.o. male who presents for a preoperative consultation.  Patient is getting a left hip replacement he has end-stage primary osteoarthritis of left hip.    He has had his right hip replaced September 2018 and left knee replaced February 2019.  There was no complications.  No anesthesia issues.    He did see the cardiologist Dr. Ricci traore for preoperative clearance prior to the hip surgery.  He was seen back on 7/2018.    Patient denies any chest pain denies any shortness of breath.    He is quite limited in his walking because of the left hip pain, bone-on-bone.    He quit smoking in April.  He does drink alcohol occasionally once every 1 to 2 weeks.    Patient has not been taking his lipid medication.  We did  recheck his lipids as well as additional labs please see below.    10 point review of systems reviewed and are negative, other than those listed in the HPI.    Pertinent History  Do you have difficulty breathing or chest pain after walking up a flight of stairs: Yes: difficulty breathing  History of obstructive sleep apnea: No  Steroid use in the last 6 months: Yes: august 2/2019- injection  Frequent Aspirin/NSAID use: No  Prior Blood Transfusion: No  Prior Blood Transfusion Reaction: No  If for some reason prior to, during or after the procedure, if it is medically indicated, would you be willing to have a blood transfusion?:  There is no transfusion refusal.    Current Outpatient Medications   Medication Sig Dispense Refill     acetaminophen (TYLENOL) 500 MG tablet Take 2 tablets (1,000 mg total) by mouth 3 (three) times a day.  0     aspirin 325 MG tablet Take 1 tablet (325 mg total) by mouth 2 (two) times a day. Take for 30 days after surgery, then resume taking aspirin 81mg daily.  0     cholecalciferol, vitamin D3, (VITAMIN D3) 1,000 unit capsule Take 1 capsule (1,000 Units total) by mouth daily. 30 capsule 6     ibuprofen (ADVIL,MOTRIN) 800 MG tablet 1 p.o. twice daily as needed pain 60 tablet 2     No current facility-administered medications for this visit.         No Known Allergies    Patient Active Problem List   Diagnosis     Family history of diabetes mellitus     Personal history of tobacco use, presenting hazards to health     Hip pain, right     Left knee pain     Chronic low back pain     Alcohol use     Primary osteoarthritis of right hip     Primary osteoarthritis of left knee       Past Medical History:   Diagnosis Date     Arthritis        Past Surgical History:   Procedure Laterality Date     JOINT REPLACEMENT       DC TOTAL HIP ARTHROPLASTY Right 9/18/2018    Procedure: RIGHT TOTAL HIP ARTHROPLASTY, POSTERIOR APPROACH;  Surgeon: Gerard Evans MD;  Location: [facility];  Service:  "Orthopedics     ME TOTAL KNEE ARTHROPLASTY Left 2019    Procedure: LEFT TOTAL KNEE ARTHROPLASTY;  Surgeon: Gerard Evans MD;  Location: [facility] Main OR;  Service: Orthopedics     No personal or family history for anesthesia or bleeding problems.      Social History     Socioeconomic History     Marital status: Single     Spouse name: Not on file     Number of children: Not on file     Years of education: Not on file     Highest education level: Not on file   Occupational History     Not on file   Social Needs     Financial resource strain: Not on file     Food insecurity:     Worry: Not on file     Inability: Not on file     Transportation needs:     Medical: Not on file     Non-medical: Not on file   Tobacco Use     Smoking status: Former Smoker     Packs/day: 1.00     Types: Cigarettes     Last attempt to quit: 2018     Years since quittin.1     Smokeless tobacco: Never Used   Substance and Sexual Activity     Alcohol use: Yes     Alcohol/week: 7.2 oz     Types: 12 Shots of liquor per week     Comment: per week     Drug use: No     Sexual activity: Not on file   Lifestyle     Physical activity:     Days per week: Not on file     Minutes per session: Not on file     Stress: Not on file   Relationships     Social connections:     Talks on phone: Not on file     Gets together: Not on file     Attends Jew service: Not on file     Active member of club or organization: Not on file     Attends meetings of clubs or organizations: Not on file     Relationship status: Not on file     Intimate partner violence:     Fear of current or ex partner: Not on file     Emotionally abused: Not on file     Physically abused: Not on file     Forced sexual activity: Not on file   Other Topics Concern     Not on file   Social History Narrative     Not on file             Objective:     Vitals:    09/10/19 0923   BP: 110/72   Pulse: 100   Resp: 16   Weight: 213 lb (96.6 kg)   Height: 6' 2\" (1.88 m)         Physical " Exam:    General appearance no acute distress    HEENT canals and TMs normal oropharynx is clear pupils react normally extract movements full    Lungs are clear no rales or rhonchi heart was regular S1-S2, no murmur    Abdomen soft nontender no guarding or rebound    Lower extremities with trace edema he does get some puffiness in his right knee at times he states.    No calf redness warmth or swelling    Skin was normal no rashes.    Pain in through the left hip with internal and external rotation.      EKG: EKG was personally reviewed and agree with reading below.  Left anterior fascicular block unchanged.  No acute changes.    Chest x-ray showed lungs slightly hyperinflated but lungs were clear, heart size normal.    Labs: CBC normal as outlined below.    Additional labs of CMP and lipid and INR pending.  This will be faxed to [facility]..      Recent Results (from the past 24 hour(s))   HM2(CBC w/o Differential)    Collection Time: 09/10/19  9:45 AM   Result Value Ref Range    WBC 4.9 4.0 - 11.0 thou/uL    RBC 4.64 4.40 - 6.20 mill/uL    Hemoglobin 15.3 14.0 - 18.0 g/dL    Hematocrit 44.3 40.0 - 54.0 %    MCV 95 80 - 100 fL    MCH 32.9 27.0 - 34.0 pg    MCHC 34.5 32.0 - 36.0 g/dL    RDW 13.4 11.0 - 14.5 %    Platelets 315 140 - 440 thou/uL    MPV 6.9 (L) 7.0 - 10.0 fL   Electrocardiogram Perform - Clinic    Collection Time: 09/10/19 10:05 AM   Result Value Ref Range    SYSTOLIC BLOOD PRESSURE  mmHg    DIASTOLIC BLOOD PRESSURE  mmHg    VENTRICULAR RATE 89 BPM    ATRIAL RATE 89 BPM    P-R INTERVAL 164 ms    QRS DURATION 102 ms    Q-T INTERVAL 352 ms    QTC CALCULATION (BEZET) 428 ms    P Axis 60 degrees    R AXIS -77 degrees    T AXIS 50 degrees    MUSE DIAGNOSIS       Normal sinus rhythm  Left anterior fascicular block  Abnormal ECG  When compared with ECG of 14-JAN-2019 14:38,  No significant change was found         Immunization History   Administered Date(s) Administered     Td, adult adsorbed, PF 06/14/2018            Electronically signed by Addy Ivy MD 09/10/19 9:26 AM

## 2021-06-01 NOTE — ANESTHESIA PREPROCEDURE EVALUATION
Anesthesia Evaluation      No history of anesthetic complications     Airway   Mallampati: II  Neck ROM: full   Pulmonary - normal exam   (+) a smoker                         Cardiovascular - negative ROS and normal exam   Neuro/Psych - negative ROS     Endo/Other       GI/Hepatic/Renal - negative ROS           Dental - normal exam                        Anesthesia Plan  Planned anesthetic: spinal    ASA 2     Anesthetic plan and risks discussed with: patient    Post-op plan: routine recovery

## 2021-06-02 VITALS — BODY MASS INDEX: 27.18 KG/M2 | WEIGHT: 205.06 LBS | HEIGHT: 73 IN

## 2021-06-02 VITALS — WEIGHT: 222 LBS | HEIGHT: 74 IN | BODY MASS INDEX: 28.49 KG/M2

## 2021-06-02 VITALS — BODY MASS INDEX: 28.36 KG/M2 | WEIGHT: 214 LBS | HEIGHT: 73 IN

## 2021-06-02 VITALS — BODY MASS INDEX: 28.36 KG/M2 | WEIGHT: 221 LBS | HEIGHT: 74 IN

## 2021-06-02 VITALS — BODY MASS INDEX: 27.59 KG/M2 | WEIGHT: 215 LBS | HEIGHT: 74 IN

## 2021-06-02 NOTE — TELEPHONE ENCOUNTER
RN cannot approve Refill Request    RN can NOT refill this medication med is not covered by policy/route to provider and medication not on med list. Last office visit: 2/19/2019 Addy Ivy MD Last Physical: 9/10/2019 Last MTM visit: Visit date not found Last visit same specialty: 2/19/2019 Addy Ivy MD.  Next visit within 3 mo: Visit date not found  Next physical within 3 mo: Visit date not found      [facility] Duckworth, [facility] Triage/Med Refill 10/11/2019    Requested Prescriptions   Pending Prescriptions Disp Refills     ibuprofen (ADVIL,MOTRIN) 800 MG tablet [Pharmacy Med Name: IBUPROFEN 800MG TABLETS] 60 tablet 0     Sig: TAKE 1 TABLET BY MOUTH TWICE DAILY AS NEEDED       There is no refill protocol information for this order

## 2021-06-02 NOTE — PROGRESS NOTES
Subjective: Patient comes in for hospital discharge follow-up.  He has left hip replacement.  The dressing is still in place there is been no redness or warmth or drainage or swelling around the area.    The pains under control he is using minimal oxycodone only 1 or 2 a day now he does take Celebrex and as needed Tylenol    He is not needed any Deena-Colace any further he is using prune juice as needed.    He was at [facility] from October 2 through October 4.    He follows up with Dr. Evans the orthopedist tomorrow    In addition on review of systems she is had some area of blurry vision more in the central part of his vision worse on the right eye than the left.    I am going to have him see [facility].    Tobacco status: He  reports that he quit smoking about 14 months ago. His smoking use included cigarettes. He smoked 1.00 pack per day. He has never used smokeless tobacco.    Patient Active Problem List    Diagnosis Date Noted     Primary osteoarthritis of left hip 10/02/2019     Primary osteoarthritis of left knee 02/06/2019     Primary osteoarthritis of right hip 09/18/2018     Family history of diabetes mellitus 06/15/2018     Personal history of tobacco use, presenting hazards to health 06/15/2018     Hip pain, right 06/15/2018     Left knee pain 06/15/2018     Chronic low back pain 06/15/2018     Alcohol use 06/15/2018       Current Outpatient Medications   Medication Sig Dispense Refill     acetaminophen (TYLENOL) 500 MG tablet Take 1,000 mg by mouth every 6 (six) hours as needed (Pain).              aspirin 81 mg chewable tablet Chew 1 tablet (81 mg total) 2 (two) times a day. 60 tablet 0     celecoxib (CELEBREX) 200 MG capsule Take 200 mg by mouth daily.              oxyCODONE (ROXICODONE) 5 MG immediate release tablet Take 1-2 tablets (5-10 mg total) by mouth every 4 (four) hours as needed. 15 tablet 0     ibuprofen (ADVIL,MOTRIN) 800 MG tablet TAKE 1 TABLET BY MOUTH TWICE DAILY AS  NEEDED 60 tablet 0     senna-docusate (PERICOLACE) 8.6-50 mg tablet Take 1 tablet by mouth 2 (two) times a day as needed for constipation.  0     No current facility-administered medications for this visit.        ROS:   10 point review of systems positive as discussed above otherwise negative    Objective:    /78 (Patient Site: Right Arm, Patient Position: Sitting, Cuff Size: Adult Large)   Pulse 92   Wt (!) 223 lb 8 oz (101.4 kg)   BMI 29.49 kg/m    Body mass index is 29.49 kg/m .      General appearance no acute distress    HEENT: Neck is supple no adenopathy oropharynx clear    Lungs clear no rales or rhonchi, heart regular rate at 90    Abdomen soft nontender no guarding or rebound no masses    Left hip with dressing in place no redness or warmth or swelling.    Lower extremities without edema.    Skin was normal.    Last hemoglobin stable in the hospital at 10.7, was 10.6 the day prior to discharge.    Eye exam with pupils react normally extract movements full.    Fundi grossly normal        Assessment:  1. Hospital discharge follow-up     2. History of left hip replacement     3. Blurry vision, bilateral  Ambulatory referral to Ophthalmology     Will discharge follow-up status post left hip replacement stable follow-up with orthopedist tomorrow    Central vision with some blurry changes which is new more on the right eye than the left see    Plan: As discussed above    This transcription uses voice recognition software, which may contain typographical errors.

## 2021-06-02 NOTE — PROGRESS NOTES
TCM DISCHARGE FOLLOW UP CALL    Discharge Date:  10/4/2019  Reason for hospital stay (discharge diagnosis)::  (L) ALDO  Are you feeling better, the same or worse since your discharge?:  Patient is feeling better (Minimal pain. Never needed oxycodone. Incis swollen, denies redness or increased heat. MONICA socks on. denies constipation)  Do you feel like you have a plan in the event of a health emergency?: Yes (nephew)    As part of your discharge plan, were  home care services ordered for you?: No    Did you receive any new medications, or was there a change to your medications?: Yes    Are you taking those medications, or do you have any established regiment?:  Pt hasn't been taking ASA 81 mg, he forgot. He will start 81 mg two times a day tonight. Last dose of Celebrex was taken yesterday. Supplemented with Tylenol.  Do you have any follow up visits scheduled with your PCP or Specialist?:  Yes, with PCP and Yes, with Specialist  (RN) Is PCP appt scheduled soon enough (within 14 days of discharge date)?: Yes (10/14)    Who are you seeing and when is it scheduled?:  Ortho 10/15

## 2021-06-03 VITALS
WEIGHT: 213 LBS | SYSTOLIC BLOOD PRESSURE: 110 MMHG | BODY MASS INDEX: 27.34 KG/M2 | DIASTOLIC BLOOD PRESSURE: 72 MMHG | HEART RATE: 100 BPM | RESPIRATION RATE: 16 BRPM | HEIGHT: 74 IN

## 2021-06-03 VITALS
BODY MASS INDEX: 29.49 KG/M2 | DIASTOLIC BLOOD PRESSURE: 78 MMHG | WEIGHT: 223.5 LBS | SYSTOLIC BLOOD PRESSURE: 104 MMHG | HEART RATE: 92 BPM

## 2021-06-03 VITALS — WEIGHT: 217.7 LBS | HEIGHT: 73 IN | BODY MASS INDEX: 28.85 KG/M2

## 2021-06-03 NOTE — TELEPHONE ENCOUNTER
----- Message from Addy Ivy MD sent at 11/14/2019  7:44 AM CST -----  Please fax these labs to [facility] at [phone]

## 2021-06-03 NOTE — PROGRESS NOTES
Preoperative Exam    Scheduled Procedure: Rotator cuffs  Surgery Date:  11/22/2019  Surgery Location: [facility], fax [phone]    Surgeon:  Dr. Velasco    Assessment/Plan:     1. Preop general physical exam  Electrocardiogram Perform - Clinic    HM2(CBC w/o Differential)    Basic Metabolic Panel   2. Primary osteoarthritis of left knee  ibuprofen (ADVIL,MOTRIN) 800 MG tablet   3. Tear of left rotator cuff, unspecified tear extent, unspecified whether traumatic         Surgical Procedure Risk: Low (reported cardiac risk generally < 1%)  Have you had prior anesthesia?: Yes  Have you or any family members had a previous anesthesia reaction:  No  Do you or any family members have a history of a clotting or bleeding disorder?: No  Cardiac Risk Assessment: no increased risk for major cardiac complications    APPROVAL GIVEN to proceed with proposed procedure, without further diagnostic evaluation        Functional Status: Independent  Patient plans to recover at home with family.     Subjective:      Wm Gaines is a 59 y.o. male who presents for a preoperative consultation.  Patient has previous injury to left shoulder    MRI has shown large tears of the infraspinatus and supraspinatus and partial subscapularis tear    Plan for repair.    Patient has had both hips replaced and his left knee.  Most recently on 10-19 and had a left hip total hip arthroplasty.    Has healed well incision is clean.    Patient previously smoked no longer does denies any respiratory issues.    He denies any personal or family history for anesthesia problems.    10 point review of systems reviewed and are negative, other than those listed in the HPI.    Pertinent History  Do you have difficulty breathing or chest pain after walking up a flight of stairs: Yes: sometimes  History of obstructive sleep apnea: No  Steroid use in the last 6 months: No  Frequent Aspirin/NSAID use: No  Prior Blood  Transfusion: No  Prior Blood Transfusion Reaction: No  If for some reason prior to, during or after the procedure, if it is medically indicated, would you be willing to have a blood transfusion?:  There is no transfusion refusal.    Current Outpatient Medications   Medication Sig Dispense Refill     acetaminophen (TYLENOL) 500 MG tablet Take 1,000 mg by mouth every 6 (six) hours as needed (Pain).              celecoxib (CELEBREX) 200 MG capsule Take 200 mg by mouth daily.              ibuprofen (ADVIL,MOTRIN) 800 MG tablet TAKE 1 TABLET BY MOUTH TWICE DAILY AS NEEDED 60 tablet 0     oxyCODONE (ROXICODONE) 5 MG immediate release tablet Take 1-2 tablets (5-10 mg total) by mouth every 4 (four) hours as needed. 15 tablet 0     senna-docusate (PERICOLACE) 8.6-50 mg tablet Take 1 tablet by mouth 2 (two) times a day as needed for constipation.  0     No current facility-administered medications for this visit.         No Known Allergies    Patient Active Problem List   Diagnosis     Family history of diabetes mellitus     Personal history of tobacco use, presenting hazards to health     Hip pain, right     Left knee pain     Chronic low back pain     Alcohol use     Primary osteoarthritis of right hip     Primary osteoarthritis of left knee     Primary osteoarthritis of left hip       Past Medical History:   Diagnosis Date     Arthritis        Past Surgical History:   Procedure Laterality Date     JOINT REPLACEMENT Left 02/06/2019    TKA     [place] TOTAL HIP ARTHROPLASTY Right 9/18/2018    Procedure: RIGHT TOTAL HIP ARTHROPLASTY, POSTERIOR APPROACH;  Surgeon: Gerard Evans MD;  Location: [facility];  Service: Orthopedics     ID TOTAL HIP ARTHROPLASTY Left 10/2/2019    Procedure: LEFT DIRECT ANTERIOR TOTAL HIP ARTHROPLASTY;  Surgeon: Gerard Evans MD;  Location: [facility] OR;  Service: Orthopedics     ID TOTAL KNEE ARTHROPLASTY Left 2/6/2019    Procedure: LEFT TOTAL KNEE ARTHROPLASTY;  Surgeon: Gerard Evans MD;   "Location: [facility] OR;  Service: Orthopedics       Social History     Socioeconomic History     Marital status: Single     Spouse name: Not on file     Number of children: Not on file     Years of education: Not on file     Highest education level: Not on file   Occupational History     Not on file   Social Needs     Financial resource strain: Not on file     Food insecurity:     Worry: Not on file     Inability: Not on file     Transportation needs:     Medical: Not on file     Non-medical: Not on file   Tobacco Use     Smoking status: Former Smoker     Packs/day: 1.00     Types: Cigarettes     Last attempt to quit: 2018     Years since quittin.3     Smokeless tobacco: Never Used   Substance and Sexual Activity     Alcohol use: Yes     Alcohol/week: 10.0 standard drinks     Types: 10 Shots of liquor per week     Drug use: No     Sexual activity: Not on file   Lifestyle     Physical activity:     Days per week: Not on file     Minutes per session: Not on file     Stress: Not on file   Relationships     Social connections:     Talks on phone: Not on file     Gets together: Not on file     Attends Evangelical service: Not on file     Active member of club or organization: Not on file     Attends meetings of clubs or organizations: Not on file     Relationship status: Not on file     Intimate partner violence:     Fear of current or ex partner: Not on file     Emotionally abused: Not on file     Physically abused: Not on file     Forced sexual activity: Not on file   Other Topics Concern     Not on file   Social History Narrative     Not on file             Objective:     Vitals:    19 1437   BP: 106/74   Pulse: (!) 105   Resp: 28   Temp: 96.9  F (36.1  C)   TempSrc: Oral   SpO2: 99%   Weight: (!) 231 lb (104.8 kg)   Height: 6' 1\" (1.854 m)         Physical Exam:    General appearance no acute distress.    HEENT neck supple oropharynx clear he does have upper dentures.    Canals and TMs " normal    Lungs are clear no rales no rhonchi heart was regular S1-S2 O2 sat 99%    No heart murmur    Abdomen soft nontender no guarding or rebound    Pain at the anterior glenohumeral joint decreased range of motion pain with hyper abduction and external rotation.    Pulses were normal    Left hip incision looks good    Trace edema bilaterally.    Skin was normal no rashes.      EKG: EKG was personally reviewed and agree with reading below    Chest x-ray from 9/10/2019 showed mild hyperinflation lungs are clear, heart size normal        Labs: BMP is pending and will be faxed    CBC with hemoglobin up to 12.8 white count normal.      Recent Results (from the past 24 hour(s))   Electrocardiogram Perform - Clinic    Collection Time: 11/13/19  2:53 PM   Result Value Ref Range    SYSTOLIC BLOOD PRESSURE      DIASTOLIC BLOOD PRESSURE      VENTRICULAR RATE 97 BPM    ATRIAL RATE 97 BPM    P-R INTERVAL 172 ms    QRS DURATION 108 ms    Q-T INTERVAL 348 ms    QTC CALCULATION (BEZET) 441 ms    P Axis 65 degrees    R AXIS -74 degrees    T AXIS 59 degrees    MUSE DIAGNOSIS       Normal sinus rhythm  Left anterior fascicular block  Abnormal ECG  When compared with ECG of 10-SEP-2019 10:05,  No significant change was found  Confirmed by SHELL SILVA MD LOC:JN ([id number]) on 11/13/2019 3:05:39 PM     HM2(CBC w/o Differential)    Collection Time: 11/13/19  3:12 PM   Result Value Ref Range    WBC 4.5 4.0 - 11.0 thou/uL    RBC 3.95 (L) 4.40 - 6.20 mill/uL    Hemoglobin 12.8 (L) 14.0 - 18.0 g/dL    Hematocrit 37.6 (L) 40.0 - 54.0 %    MCV 95 80 - 100 fL    MCH 32.5 27.0 - 34.0 pg    MCHC 34.1 32.0 - 36.0 g/dL    RDW 13.3 11.0 - 14.5 %    Platelets 300 140 - 440 thou/uL    MPV 6.5 (L) 7.0 - 10.0 fL       Immunization History   Administered Date(s) Administered     Td, adult adsorbed, PF 06/14/2018           Electronically signed by Addy Ivy MD 11/13/19 2:39 PM

## 2021-06-04 VITALS
DIASTOLIC BLOOD PRESSURE: 74 MMHG | RESPIRATION RATE: 28 BRPM | WEIGHT: 231 LBS | HEART RATE: 105 BPM | SYSTOLIC BLOOD PRESSURE: 106 MMHG | HEIGHT: 73 IN | BODY MASS INDEX: 30.62 KG/M2 | TEMPERATURE: 96.9 F | OXYGEN SATURATION: 99 %

## 2021-06-04 NOTE — TELEPHONE ENCOUNTER
RN cannot approve Refill Request    RN can NOT refill this medication med is not covered by policy/route to provider. Last office visit: 10/14/2019 Addy Ivy MD Last Physical: 11/13/2019 Last MTM visit: Visit date not found Last visit same specialty: 10/14/2019 Addy Ivy MD.  Next visit within 3 mo: Visit date not found  Next physical within 3 mo: Visit date not found      Keisha Wood, [facility] Triage/Med Refill 12/14/2019    Requested Prescriptions   Pending Prescriptions Disp Refills     ibuprofen (ADVIL,MOTRIN) 800 MG tablet [Pharmacy Med Name: IBUPROFEN 800MG TABLETS] 60 tablet 0     Sig: TAKE 1 TABLET(800 MG) BY MOUTH TWICE DAILY AS NEEDED       There is no refill protocol information for this order

## 2021-06-04 NOTE — TELEPHONE ENCOUNTER
Medication Request  Medication name:   cholecalciferol, vitamin D3, (VITAMIN D3) 1,000 unit capsule (Discontinued) 30 capsule 6 6/26/2018 10/2/2019 --   Sig - Route: Take 1 capsule (1,000 Units total) by mouth daily. - Oral       Pharmacy Name and Location: [facility]   Reason for request: Patient request.    Okay to leave a detailed message: yes

## 2021-06-05 VITALS
HEART RATE: 93 BPM | BODY MASS INDEX: 28.23 KG/M2 | OXYGEN SATURATION: 96 % | DIASTOLIC BLOOD PRESSURE: 88 MMHG | WEIGHT: 213 LBS | SYSTOLIC BLOOD PRESSURE: 136 MMHG | HEIGHT: 73 IN | RESPIRATION RATE: 20 BRPM | TEMPERATURE: 98.4 F

## 2021-06-05 VITALS
SYSTOLIC BLOOD PRESSURE: 125 MMHG | RESPIRATION RATE: 20 BRPM | HEIGHT: 73 IN | HEART RATE: 96 BPM | WEIGHT: 212 LBS | BODY MASS INDEX: 28.1 KG/M2 | OXYGEN SATURATION: 98 % | DIASTOLIC BLOOD PRESSURE: 87 MMHG | TEMPERATURE: 98.7 F

## 2021-06-05 VITALS
HEART RATE: 101 BPM | DIASTOLIC BLOOD PRESSURE: 87 MMHG | TEMPERATURE: 97.3 F | SYSTOLIC BLOOD PRESSURE: 130 MMHG | BODY MASS INDEX: 29.57 KG/M2 | WEIGHT: 224.13 LBS

## 2021-06-05 VITALS — HEIGHT: 73 IN | BODY MASS INDEX: 27.3 KG/M2 | WEIGHT: 206 LBS

## 2021-06-10 NOTE — TELEPHONE ENCOUNTER
RN cannot approve Refill Request    RN can NOT refill this medication Protocol failed and NO refill given. Last office visit: 10/14/2019 Addy Ivy MD Last Physical: 2019 Last MTM visit: Visit date not found Last visit same specialty: 10/14/2019 Addy Ivy MD.  Next visit within 3 mo: Visit date not found  Next physical within 3 mo: Visit date not found      Jojo Serraon, [facility] Triage/Med Refill 2020    Requested Prescriptions   Pending Prescriptions Disp Refills     cholecalciferol, vitamin D3, 25 mcg (1,000 unit) capsule 90 capsule 1     Si p.o. daily       There is no refill protocol information for this order        ibuprofen (ADVIL,MOTRIN) 800 MG tablet 60 tablet 0       There is no refill protocol information for this order

## 2021-06-11 NOTE — TELEPHONE ENCOUNTER
Spoke to the pt and informed him that Dr. Ivy only does VV. Per pt ok to see Dr. Green. Appt was made for the pt with Dr. Green for a pre-op visit.

## 2021-06-11 NOTE — TELEPHONE ENCOUNTER
Labs faxed to [facility] in [facility].  ----- Message from Jay Green MD sent at 9/22/2020  1:05 PM CDT -----  Please forward this for preop.

## 2021-06-11 NOTE — TELEPHONE ENCOUNTER
New Appointment Needed  What is the reason for the visit:    Pre-Op Appt Request  When is the surgery? :  10-08-20  Where is the surgery?:   [facility]   Who is the surgeon? :  Dr. Marc  What type of surgery is being done?: Right knee   Provider Preference: PCP only  How soon do you need to be seen?: He would like to be seen on Friday the 11th of September.  Waitlist offered?: No  Okay to leave a detailed message:  Yes

## 2021-06-11 NOTE — PROGRESS NOTES
[facility] PRE-OPERATIVE VISIT FOR:    Wm Gaines,  1960, MRN [id number]    Upcoming surgery date: 10/8  Surgeon: Lupis  Surgery Facility: [facility]    Chief Complaint: Preop right total knee arthroplasty    PCP: Addy Ivy MD, [phone]    SUBJECTIVE:  History of Present Illness:   Wm Gaines is a 59 y.o. male with history of hip and knee osteoarthritis, has had both hips and left knee placed in the past, now has consistent, chronic pain with end-stage arthritic changes in right knee.  Patient reports being in good health otherwise.    Past Medical History:  Patient Active Problem List   Diagnosis     Family history of diabetes mellitus     Personal history of tobacco use, presenting hazards to health     Hip pain, right     Left knee pain     Chronic low back pain     Alcohol use     Primary osteoarthritis of right hip     Primary osteoarthritis of left knee     Primary osteoarthritis of left hip   Patient no longer smokes and reports light alcohol use  Past Surgical History:   Procedure Laterality Date     JOINT REPLACEMENT Left 2019    TKA     [place] TOTAL HIP ARTHROPLASTY Right 2018    Procedure: RIGHT TOTAL HIP ARTHROPLASTY, POSTERIOR APPROACH;  Surgeon: Gerard Evans MD;  Location: [facility];  Service: Orthopedics     [place] TOTAL HIP ARTHROPLASTY Left 10/2/2019    Procedure: LEFT DIRECT ANTERIOR TOTAL HIP ARTHROPLASTY;  Surgeon: Gerard Evans MD;  Location: [facility] OR;  Service: Orthopedics     [place] TOTAL KNEE ARTHROPLASTY Left 2019    Procedure: LEFT TOTAL KNEE ARTHROPLASTY;  Surgeon: Gerard Evans MD;  Location: [facility] OR;  Service: Orthopedics     Any history of anesthesia reaction no  Do you have difficulty breathing or chest pain after walking up a flight of stairs: No  History of obstructive sleep apnea: No  Steroid use in the last 6 months: No  Frequent Aspirin/NSAID use: Yes: ibuprofen- will stop  Prior Blood Transfusion:  No  Prior Blood Transfusion Reaction: No  If for some reason prior to, during or after the procedure, if it is medically indicated, would you be willing to have a blood transfusion?:  There is no transfusion refusal.    Social History:  sig  Family History:  No family history on file.    Current Medications:  Current Outpatient Medications   Medication Sig Dispense Refill     acetaminophen (TYLENOL) 500 MG tablet Take 1,000 mg by mouth every 6 (six) hours as needed (Pain).              cholecalciferol, vitamin D3, 25 mcg (1,000 unit) capsule 1 p.o. daily 90 capsule 0     ibuprofen (ADVIL,MOTRIN) 800 MG tablet TAKE 1 TABLET(800 MG) BY MOUTH TWICE DAILY AS NEEDED 60 tablet 0     No current facility-administered medications for this visit.        Allergies:  Patient has no known allergies.    Review or Systems:  Constitution: normal  HEENT: normal  Pulmonary: normal  Cardiovascular: normal  GI: normal  : normal  Musculoskeletal: normal  Neuro: normal  Endocrine: normal  Psych: normal  Lymph/Heme: normal    OBJECTIVE:  Vitals:    09/18/20 1311   BP: 136/88   Pulse: 93   Resp: 20   Temp: 98.4  F (36.9  C)   SpO2: 96%     General Appearance:    Alert, cooperative, no distress, appears stated age   Head:    Normocephalic, without obvious abnormality, atraumatic   Eyes:    PERRL, conjunctiva/corneas clear, EOM's intact   Nose:   Mucosa moist, normal    Throat:   Lips, mucosa, and tongue normal; ora phaynx clear   Neck:   Supple, symmetrical, trachea midline, no adenopathy;     thyroid:  no enlargement/tenderness/nodules; no carotid    bruit or JVD   Lungs:     Clear to auscultation bilaterally, respirations unlabored    Heart:    Regular rate and rhythm, S1 and S2 normal, no murmur, rub   or gallop   Abdomen:     Soft, non-tender, bowel sounds active all four quadrants,     no masses, no organomegaly   Extremities:   Extremities normal, atraumatic, no cyanosis or 1+ lower extremity edema.  Large saphenous varicose vein,  right leg, large effusion right knee       Skin:   Skin color, texture, turgor normal, no rashes or lesions   Neurologic:   No focal deficits         Labs:  Results for orders placed or performed in visit on 09/18/20   Electrocardiogram Perform - Clinic   Result Value Ref Range    SYSTOLIC BLOOD PRESSURE      DIASTOLIC BLOOD PRESSURE      VENTRICULAR RATE 90 BPM    ATRIAL RATE 90 BPM    P-R INTERVAL 164 ms    QRS DURATION 108 ms    Q-T INTERVAL 354 ms    QTC CALCULATION (BEZET) 433 ms    P Axis 63 degrees    R AXIS -84 degrees    T AXIS 56 degrees    MUSE DIAGNOSIS       Normal sinus rhythm  Incomplete right bundle branch block  Left anterior fascicular block  Abnormal ECG  When compared with ECG of 13-NOV-2019 14:53,  Incomplete right bundle branch block is now Present       Personally reviewed and agree computers overread.  Hemoglobin and potassium are pending and will be forwarded  ASSESSMENT/PLAN:  Intermediate risk surgery  No known cardiac disease  Acceptable functional capacity    Acceptable risk for surgery  No special recommendations:  Hold ibuprofen 10 days prior to surgery, n.p.o. on morning of surgery.    Addendum 10/13/2020  Patient surgery ended up being held up due to insurance reasons.,  It is rescheduled for 10/18/2020.  Health was reviewed over the phone, no updates, he remains in good health.  Therefore, remains acceptable surgical candidate, no change in recommendations listed above.  Jay Green MD

## 2021-06-12 NOTE — ANESTHESIA CARE TRANSFER NOTE
Last vitals:   Vitals:    11/04/20 1321   BP: 107/61   Pulse: 94   Resp: 16   Temp: 36.3  C (97.4  F)   SpO2: 100%     Patient's level of consciousness is drowsy  Spontaneous respirations: yes  Maintains airway independently: yes  Dentition unchanged: yes  Oropharynx: oropharynx clear of all foreign objects    QCDR Measures:  ASA# 20 - Surgical Safety Checklist: WHO surgical safety checklist completed prior to induction    PQRS# 430 - Adult PONV Prevention: 4558F - Pt received => 2 anti-emetic agents (different classes) preop & intraop  ASA# 8 - Peds PONV Prevention: NA - Not pediatric patient, not GA or 2 or more risk factors NOT present  PQRS# 424 - Deena-op Temp Management: 4559F - At least one body temp DOCUMENTED => 35.5C or 95.9F within required timeframe  PQRS# 426 - PACU Transfer Protocol: - Transfer of care checklist used  ASA# 14 - Acute Post-op Pain: ASA14B - Patient did NOT experience pain >= 7 out of 10

## 2021-06-12 NOTE — PROGRESS NOTES
"[address] [address]  Dept: [phone]  Dept Fax: [phone]  Primary Provider: Addy Ivy MD  Pre-op Performing Provider: MARLON TRIVEDI    PREOPERATIVE EVALUATION:  Today's date: 11/2/2020    Wm Gaines is a 60 y.o. male who presents for a preoperative evaluation.    Surgical Information:  Surgery/Procedure: right total knee arthroplasty  Surgery Location: [facility]  Surgeon: Dr. Evans  Surgery Date: 11/04/2020  Time of Surgery: 10:55  Where patient plans to recover: At home with family  Fax number for surgical facility: Note does not need to be faxed, will be available electronically in Epic.    Type of Anesthesia Anticipated: spinal with adductor block    Subjective     HPI related to upcoming procedure: Patient has h/o significant DJD - has already had bilateral total hip arthroplasty and total left knee arthroplasty (right hip, then left knee, then left hip); also had left rotator cuff surgery (last November 2019); was already scheduled for surgery on 10/9, but not covered at [facility] - now scheduled at [facility].  Looking forward to getting this \"last\" joint done.       Preop Questions 10/31/2020   Have you ever had a heart attack or stroke? No   Have you ever had surgery on your heart or blood vessels, such as a stent placement, a coronary artery bypass, or surgery on an artery in your head, neck, heart, or legs? No   Do you have chest pain with activity? No   Do you have a history of  heart failure? No   Do you currently have a cold, bronchitis or symptoms of other infection? No   Do you have a cough, shortness of breath, or wheezing? No   Do you or anyone in your family have previous history of blood clots? No   Do you or does anyone in your family have a serious bleeding problem such as prolonged bleeding following surgeries or cuts? No   Have you ever had problems with anemia or been told to " "take iron pills? No   Have you had any abnormal blood loss such as black, tarry or bloody stools? No   Have you ever had a blood transfusion? No   Are you willing to have a blood transfusion if it is medically needed before, during, or after your surgery? Yes   Have you or any of your relatives ever had problems with anesthesia? No   Do you have sleep apnea, excessive snoring or daytime drowsiness? No   Do you have any artifical heart valves or other implanted medical devices like a pacemaker, defibrillator, or continuous glucose monitor? No   Do you have artificial joints? YES -      Are you allergic to latex? No     Health Care Directive:  Patient does not have a Health Care Directive or Living Will: Discussed advance care planning with patient; however, patient declined at this time.  \"we will take it as it comes\"    Preoperative Review of :    reviewed - no record of controlled substances prescribed.    See problem list for active medical problems.  Problems all longstanding and stable, except as noted/documented.  See ROS for pertinent symptoms related to these conditions.      Takes ASA (81 mg); will take Celecoxib day before surgery (per surgeon) and will use Toradol (?)     Review of Systems  CONSTITUTIONAL: NEGATIVE for fever, chills, change in weight  INTEGUMENTARY/SKIN: NEGATIVE for worrisome rashes, moles or lesions  EYES: NEGATIVE for vision changes or irritation  ENT/MOUTH: NEGATIVE for ear, mouth and throat problems  RESP: NEGATIVE for significant cough or SOB  BREAST: NEGATIVE for masses, tenderness or discharge  CV: NEGATIVE for chest pain, palpitations or peripheral edema  GI: NEGATIVE for nausea, abdominal pain, heartburn, or change in bowel habits  : NEGATIVE for frequency, dysuria, or hematuria  MUSCULOSKELETAL: joint stiffness right knee - deformity  NEURO: NEGATIVE for weakness, dizziness or paresthesias  ENDOCRINE: NEGATIVE for temperature intolerance, skin/hair changes  HEME: " NEGATIVE for bleeding problems  PSYCHIATRIC: NEGATIVE for changes in mood or affect    Patient Active Problem List    Diagnosis Date Noted     Primary osteoarthritis of right knee 11/02/2020     Primary osteoarthritis of left hip 10/02/2019     Primary osteoarthritis of left knee 02/06/2019     Primary osteoarthritis of right hip 09/18/2018     Family history of diabetes mellitus 06/15/2018     Personal history of tobacco use, presenting hazards to health 06/15/2018     Hip pain, right 06/15/2018     Left knee pain 06/15/2018     Chronic low back pain 06/15/2018     Alcohol use 06/15/2018     Past Medical History:   Diagnosis Date     Arthritis      Past Surgical History:   Procedure Laterality Date     JOINT REPLACEMENT Left 02/06/2019    TKA     CA TOTAL HIP ARTHROPLASTY Right 9/18/2018    Procedure: RIGHT TOTAL HIP ARTHROPLASTY, POSTERIOR APPROACH;  Surgeon: Gerard Evans MD;  Location: [facility] OR;  Service: Orthopedics     CA TOTAL HIP ARTHROPLASTY Left 10/2/2019    Procedure: LEFT DIRECT ANTERIOR TOTAL HIP ARTHROPLASTY;  Surgeon: Gerard Evans MD;  Location: [facility] OR;  Service: Orthopedics     CA TOTAL KNEE ARTHROPLASTY Left 2/6/2019    Procedure: LEFT TOTAL KNEE ARTHROPLASTY;  Surgeon: Gerard Evans MD;  Location: [facility] OR;  Service: Orthopedics     ROTATOR CUFF REPAIR Right 11/2019     Current Outpatient Medications   Medication Sig Dispense Refill     aspirin 81 MG EC tablet Take 81 mg by mouth daily.       acetaminophen (TYLENOL) 500 MG tablet Take 1,000 mg by mouth every 6 (six) hours as needed (Pain).              cholecalciferol, vitamin D3, 25 mcg (1,000 unit) capsule 1 p.o. daily 90 capsule 0     ibuprofen (ADVIL,MOTRIN) 800 MG tablet TAKE 1 TABLET(800 MG) BY MOUTH TWICE DAILY AS NEEDED 60 tablet 0     No current facility-administered medications for this visit.        No Known Allergies    Social History     Tobacco Use     Smoking status: Former Smoker     Packs/day: 1.00  "    Types: Cigarettes     Quit date: 2018     Years since quittin.2     Smokeless tobacco: Never Used   Substance Use Topics     Alcohol use: Not Currently      No family history on file.  Social History     Substance and Sexual Activity   Drug Use No        Objective     /87   Pulse 96   Temp 98.7  F (37.1  C) (Tympanic)   Resp 20   Ht 6' 1\" (1.854 m)   Wt 212 lb (96.2 kg)   SpO2 98%   BMI 27.97 kg/m    Physical Exam    GENERAL APPEARANCE: healthy, alert and no distress     EYES: EOMI,  PERRL     HENT: ear canals and TM's normal and nose and mouth without ulcers or lesions     NECK: no adenopathy, no asymmetry, masses, or scars and thyroid normal to palpation     RESP: lungs clear to auscultation - no rales, rhonchi or wheezes     CV: regular rates and rhythm, normal S1 S2, no S3 or S4 and no murmur, click or rub     ABDOMEN:  soft, nontender, no HSM or masses and bowel sounds normal     MS: S/p total joint replacements bilateral hips and left knee; now with significant joint swelling/deformity/pain at right knee     SKIN: no suspicious lesions or rashes     NEURO: Normal strength and tone, sensory exam grossly normal, mentation intact and speech normal     PSYCH: mentation appears normal. and affect normal/bright     LYMPHATICS: No cervical adenopathy    Recent Labs   Lab Test 20  1417 10/05/20  1337 09/10/19  0945 09/10/19  0945 19  1427 19  1427   HGB 15.3 15.4   < > 15.3   < > 14.0    290   < > 315   < > 278   INR  --   --   --  0.94  --  0.96    140   < > 139  --  138   K 4.5 4.4   < > 4.6   < > 4.6   CREATININE 0.74 0.70   < > 0.73  --  0.74    < > = values in this interval not displayed.        PRE-OP Diagnostics:   Recent Results (from the past 48 hour(s))   Basic Metabolic Panel    Collection Time: 20  2:17 PM   Result Value Ref Range    Sodium 139 136 - 145 mmol/L    Potassium 4.5 3.5 - 5.0 mmol/L    Chloride 102 98 - 107 mmol/L    CO2 27 22 - 31 " mmol/L    Anion Gap, Calculation 10 5 - 18 mmol/L    Glucose 95 70 - 125 mg/dL    Calcium 9.6 8.5 - 10.5 mg/dL    BUN 15 8 - 22 mg/dL    Creatinine 0.74 0.70 - 1.30 mg/dL    GFR MDRD Af Amer >60 >60 mL/min/1.73m2    GFR MDRD Non Af Amer >60 >60 mL/min/1.73m2   HM1 (CBC with Diff)    Collection Time: 11/02/20  2:17 PM   Result Value Ref Range    WBC 6.0 4.0 - 11.0 thou/uL    RBC 4.63 4.40 - 6.20 mill/uL    Hemoglobin 15.3 14.0 - 18.0 g/dL    Hematocrit 45.7 40.0 - 54.0 %    MCV 99 80 - 100 fL    MCH 33.1 27.0 - 34.0 pg    MCHC 33.5 32.0 - 36.0 g/dL    RDW 11.0 11.0 - 14.5 %    Platelets 266 140 - 440 thou/uL    MPV 6.8 (L) 7.0 - 10.0 fL    Neutrophils % 48 (L) 50 - 70 %    Lymphocytes % 36 20 - 40 %    Monocytes % 9 2 - 10 %    Eosinophils % 6 0 - 6 %    Basophils % 1 0 - 2 %    Neutrophils Absolute 2.9 2.0 - 7.7 thou/uL    Lymphocytes Absolute 2.2 0.8 - 4.4 thou/uL    Monocytes Absolute 0.5 0.0 - 0.9 thou/uL    Eosinophils Absolute 0.4 0.0 - 0.4 thou/uL    Basophils Absolute 0.1 0.0 - 0.2 thou/uL     Xr Chest 2 Views    Result Date: 11/2/2020  EXAM: XR CHEST 2 VIEWS LOCATION: [facility] DATE/TIME: 11/2/2020 2:36 PM INDICATION: Encounter for other preprocedural examination COMPARISON: 9/10/2019.     The heart is normal in size. The lungs appear clear. No infiltrate or pleural effusion is seen. Suggestion of mild hyperinflation of the lungs. Degenerative changes of the right AC joint.    Electrocardiogram Perform - Clinic  Order: [id number]  Status:  Final result   Visible to patient:  Yes (MyChart)   Next appt:  None   Dx:  Preop examination; Arthritis of knee    Ref Range & Units 9/18/20 1335 11/13/19 1453    SYSTOLIC BLOOD PRESSURE        DIASTOLIC BLOOD PRESSURE        VENTRICULAR RATE BPM 90  97     ATRIAL RATE BPM 90  97     P-R INTERVAL ms 164  172     QRS DURATION ms 108  108     Q-T INTERVAL ms 354  348     QTC CALCULATION (BEZET) ms 433  441     P Axis degrees 63  65     R AXIS  degrees -84  -74     T AXIS degrees 56  59     MUSE DIAGNOSIS  Normal sinus rhythm   Incomplete right bundle branch block   Left anterior fascicular block   Abnormal ECG   When compared with ECG of 13-NOV-2019 14:53,   Incomplete right bundle branch block is now Present   Confirmed by CHOCO SEGOVIA MD LOC:[facility] ([id number]) on 9/18/2020 2:53:51 PM  Normal sinus rhythm   Left anterior fascicular block   Abnormal ECG   When compared with ECG of 10-SEP-2019 10:05,   No significant change was found   Confirmed by SHELL SILVA MD LOC:[facility] ([id number]) on 11/13/2019 3:05:39 PM               REVISED CARDIAC RISK INDEX (RCRI)   The patient has the following serious cardiovascular risks for perioperative complications:   - No serious cardiac risks = 0 points    RCRI INTERPRETATION: 1 point: Class II (low risk - 0.9% complication rate)           Assessment & Plan      The proposed surgical procedure is considered INTERMEDIATE risk.    1. Preop examination  XR Chest 2 Views    HM1(CBC and Differential)    Basic Metabolic Panel   2. Primary osteoarthritis of right knee     3. Personal history of tobacco use, presenting hazards to health       This is a 59 yo male with significant underlying osteoarthritis, s/p multiple previous total joint arthroplastys (bilateral hip, left knee).  Now scheduled for right total knee arthroplasty.  There is no specific concern related to surgery - patient has tolerated previous surgeries without incident.  He has quit smoking as well.       Risks and Recommendations:  The patient has the following additional risks and recommendations for perioperative complications:   - No identified additional risk factors other than previously addressed    Medication Instructions:  Patient is on no chronic medications   - aspirin: patient reports having directions from Ortho.       Was scheduled, then rescheduled - then, needed more blood work -   Had to add preop physical - then, 2nd surgery was rescheduled.        RECOMMENDATION:  APPROVAL GIVEN to proceed with proposed procedure, without further diagnostic evaluation.    Signed Electronically by: Roz López MD    Copy of this evaluation report is provided to requesting physician.    [facility] Preop Guidelines    Revised Cardiac Risk Index

## 2021-06-12 NOTE — ANESTHESIA PROCEDURE NOTES
Peripheral Block    Patient location during procedure: pre-op  Start time: 11/4/2020 10:59 AM  End time: 11/4/2020 11:00 AM  post-op analgesia per surgeon order as noted in medical record  Staffing:  Performing  Anesthesiologist: Yolanda De MD  Preanesthetic Checklist  Completed: patient identified, site marked, risks, benefits, and alternatives discussed, timeout performed, consent obtained, airway assessed, oxygen available, suction available, emergency drugs available and hand hygiene performed  Peripheral Block  Block type: saphenous, adductor canal block  Prep: ChloraPrep  Patient position: supine  Patient monitoring: blood pressure, heart rate, continuous pulse oximetry and cardiac monitor  Laterality: right  Injection technique: ultrasound guided    Ultrasound used to visualize needle placement in proximity to nerve being blocked: yes   US used to visualize anesthetic spread    Permanent ultrasound image captured for medical record  Sterile gel and probe cover used for ultrasound.  Needle  Needle type: Stimuplex   Needle gauge: 20G  Needle length: 4 in  no peripheral nerve catheter placed  Assessment  Injection assessment: no difficulty with injection, negative aspiration for heme, no paresthesia on injection and incremental injection

## 2021-06-12 NOTE — TELEPHONE ENCOUNTER
New Appointment Needed  What is the reason for the visit:    Pre-Op Appt Request  When is the surgery? :  10-19-20  Where is the surgery?:   [facility]  Who is the surgeon? :  Dr. Evans  What type of surgery is being done?: Total knee   Provider Preference: Any available  How soon do you need to be seen?: tomorrow  Waitlist offered?: No  Okay to leave a detailed message:  Yes

## 2021-06-12 NOTE — TELEPHONE ENCOUNTER
RN cannot approve Refill Request    RN can NOT refill this medication med is not covered by policy/route to provider. Last office visit: 10/14/2019 Addy Ivy MD Last Physical: 11/13/2019 Last MTM visit: Visit date not found Last visit same specialty: 10/14/2019 Addy Ivy MD.  Next visit within 3 mo: Visit date not found  Next physical within 3 mo: Visit date not found      Lidia Pham, [facility] Triage/Med Refill 11/1/2020    Requested Prescriptions   Pending Prescriptions Disp Refills     ibuprofen (ADVIL,MOTRIN) 800 MG tablet [Pharmacy Med Name: IBUPROFEN 800MG TABLETS] 60 tablet 0     Sig: TAKE 1 TABLET(800 MG) BY MOUTH TWICE DAILY AS NEEDED       There is no refill protocol information for this order

## 2021-06-12 NOTE — TELEPHONE ENCOUNTER
Patient has a future F2F appointment on 10/16/2020 at 10:40 AM   with Dr. Hamilton. Completing task.

## 2021-06-12 NOTE — ANESTHESIA PREPROCEDURE EVALUATION
Anesthesia Evaluation      Patient summary reviewed   No history of anesthetic complications     Airway   Mallampati: II  Neck ROM: full   Pulmonary - normal exam                          Cardiovascular - normal exam   Neuro/Psych      Endo/Other       GI/Hepatic/Renal            Dental - normal exam                        Anesthesia Plan  Planned anesthetic: spinal and peripheral nerve block    ASA 2   Induction: intravenous   Anesthetic plan and risks discussed with: patient  Anesthesia plan special considerations: antiemetics,   Post-op plan: routine recovery

## 2021-06-12 NOTE — ANESTHESIA PROCEDURE NOTES
Peripheral Block    Patient location during procedure: pre-op  Start time: 11/4/2020 10:56 AM  End time: 11/4/2020 10:58 AM  post-op analgesia per surgeon order as noted in medical record  Staffing:  Performing  Anesthesiologist: Yolanda De MD  Preanesthetic Checklist  Completed: patient identified, site marked, risks, benefits, and alternatives discussed, timeout performed, consent obtained, airway assessed, oxygen available, suction available, emergency drugs available and hand hygiene performed  Peripheral Block  Block type: other, tibial  Prep: ChloraPrep  Patient position: right lateral decubitus  Patient monitoring: cardiac monitor, continuous pulse oximetry, heart rate and blood pressure  Laterality: left  Injection technique: ultrasound guided    Ultrasound used to visualize needle placement in proximity to nerve being blocked: yes   US used to visualize anesthetic spread    Permanent ultrasound image captured for medical record  Sterile gel and probe cover used for ultrasound.  Needle  Needle type: Stimuplex   Needle gauge: 20G  Needle length: 4 in  no peripheral nerve catheter placed  Assessment  Injection assessment: no difficulty with injection, negative aspiration for heme, no paresthesia on injection and incremental injection

## 2021-06-12 NOTE — TELEPHONE ENCOUNTER
Question following Office Visit  When did you see your provider: 09/18/20  What is your question: Lore from [facility] says that the addendum is not acceptable at the hospital over 30 days, it's been 32 days. Patient need another pre-op  Asap. Surgery is on 10/19/20. Please advise. Patient is coming at 3 pm for a covid test, can he be squeezed in today.   Okay to leave a detailed message: Yes

## 2021-06-12 NOTE — TELEPHONE ENCOUNTER
I am happy to do addendum  Please contact him, ask if his health has changed in any way- any new symptoms  If no, let me know- I will do addendum

## 2021-06-12 NOTE — ANESTHESIA POSTPROCEDURE EVALUATION
Patient: Wm Gaines  Procedure(s):  TOTAL KNEE ARTHROPLASTY (Right)  Anesthesia type: spinal    Patient location: PACU  Last vitals:   Vitals Value Taken Time   /73 11/04/20 1426   Temp 36.8  C (98.2  F) 11/04/20 1426   Pulse 83 11/04/20 1426   Resp 16 11/04/20 1426   SpO2 98 % 11/04/20 1426     Post vital signs: stable  Level of consciousness: awake and responds to simple questions  Post-anesthesia pain: pain controlled  Post-anesthesia nausea and vomiting: no  Pulmonary: unassisted, return to baseline  Cardiovascular: stable and blood pressure at baseline  Hydration: adequate  Anesthetic events: no    QCDR Measures:  ASA# 11 - Deena-op Cardiac Arrest: ASA11B - Patient did NOT experience unanticipated cardiac arrest  ASA# 12 - Deena-op Mortality Rate: ASA12B - Patient did NOT die  ASA# 13 - PACU Re-Intubation Rate: ASA13B - Patient did NOT require a new airway mgmt  ASA# 10 - Composite Anes Safety: ASA10A - No serious adverse event    Additional Notes:

## 2021-06-12 NOTE — ANESTHESIA PROCEDURE NOTES
Spinal Block    Patient location during procedure: OR  Start time: 11/4/2020 11:17 AM  End time: 11/4/2020 11:19 AM  Reason for block: primary anesthetic    Staffing:  Performing  Anesthesiologist: Yolanda De MD    Preanesthetic Checklist  Completed: patient identified, risks, benefits, and alternatives discussed, timeout performed, consent obtained, airway assessed, oxygen available, suction available, emergency drugs available and hand hygiene performed  Spinal Block  Patient position: sitting  Prep: Betadine  Patient monitoring: heart rate, cardiac monitor, continuous pulse ox and blood pressure  Approach: right paramedian  Location: L4-5  Injection technique: single-shot  Needle type: pencil-tip   Needle gauge: 24 G    Assessment  Sensory level: T8

## 2021-06-12 NOTE — TELEPHONE ENCOUNTER
Who is calling:  Patient   Reason for Call:  Caller stated that he had to reschedule his surgery due to being told last minute  that his insurance didn't cover it. Caller was now reschedule to Oct. 19 and was wondering if his pre-op will have to be done again or can it get addendum.    Date of last appointment with primary care:   Okay to leave a detailed message: Yes  [phone]

## 2021-06-12 NOTE — TELEPHONE ENCOUNTER
NAM - Status Update  Who is Calling: Patient  Update: The patient was checking the status of his request below. Patient was informed per Dr. Green's schedule, he is not in until 10/13/20. Patient stated he would like a response from him as soon as he is back.  Okay to leave a detailed message?:  No

## 2021-06-14 NOTE — TELEPHONE ENCOUNTER
RN cannot approve Refill Request    RN can NOT refill this medication med is not covered by policy/route to provider. Last office visit: Visit date not found Last Physical: Visit date not found Last MTM visit: Visit date not found Last visit same specialty: 10/14/2019 Addy Ivy MD.  Next visit within 3 mo: Visit date not found  Next physical within 3 mo: Visit date not found      Lidia Pham, [facility] Triage/Med Refill 1/9/2021    Requested Prescriptions   Pending Prescriptions Disp Refills     ibuprofen (ADVIL,MOTRIN) 800 MG tablet [Pharmacy Med Name: IBUPROFEN 800MG TABLETS] 60 tablet 0     Sig: TAKE 1 TABLET(800 MG) BY MOUTH TWICE DAILY AS NEEDED       There is no refill protocol information for this order

## 2021-06-15 NOTE — PROGRESS NOTES
ASSESSMENT/PLAN:  1. Numbness and tingling of both feet  gabapentin (NEURONTIN) 100 MG capsule   2. Onychomycosis  terbinafine HCL (LAMISIL) 250 mg tablet    Comprehensive Metabolic Panel   3. Benign prostatic hyperplasia with nocturia  PSA (Prostatic-Specific Antigen), Annual Screen       This is a 59 yo male with:  1.  Numbness and tingling in both feet - has some back/spine issues - this is likely related to the symptoms.  Has followed with Ortho for his back -  Wonders about pain // symptom control.  Will try Gabapentin for symptoms.  He is agreeable after discussion.  2.  Onychomycosis - discussed options for treatment for this condition -will add oral terbinafine - check liver tests prior to starting medication  3.  BPH - with nocturia - will check PSA - consider medication if PSA normal    Return in about 3 months (around 6/15/2021) for Recheck.      Medications Discontinued During This Encounter   Medication Reason     oxyCODONE (ROXICODONE) 5 MG immediate release tablet Therapy completed     senna-docusate (PERICOLACE) 8.6-50 mg tablet Therapy completed     celecoxib (CELEBREX) 200 MG capsule      aspirin 81 MG EC tablet Therapy completed     There are no Patient Instructions on file for this visit.    Chief Complaint:  Chief Complaint   Patient presents with     [facility]     numbniess in foot       HPI:   Wm GONZÁLES Eder is a 60 y.o. male c/o  Has had both hips and knees replaced  1.  Both feet are numb -   Saw Ortho last Monday - injured spine previously - 2012 - was catching a bag of trash , lowered as he caught it, 2 weeks later, had sciatid pain down right leg and miserable - at that point, was told he needed surgery -   S1 compressed -   Could do cortisone -   Doesn't want fusion -   Both sides - mornings feels more irritated on the top -   Not painful, just annoying  Top of the foot feels sl painful  Wants to try Gabapentin   Had right hip, then left knee, then left hip, then right knee - all  since late   Has a little drop foot left side - doesn't know when that happened -     2.  Has toenail fungus - left great and 2nd toenail, right 2nd toenail  Wants something prescribed    3.  Thinks he's 60 - enlarged prostate    MGM - cancer -  age 64 - probably lung cancer  MGF -   Siblings - no cancer  Parents - mom:  Diabetes,         PMH:   Patient Active Problem List    Diagnosis Date Noted     Status post total right knee replacement 2020     Primary osteoarthritis of right knee 2020     Primary osteoarthritis of left hip 10/02/2019     Primary osteoarthritis of left knee 2019     Primary osteoarthritis of right hip 2018     Family history of diabetes mellitus 06/15/2018     Personal history of tobacco use, presenting hazards to health 06/15/2018     Hip pain, right 06/15/2018     Left knee pain 06/15/2018     Chronic low back pain 06/15/2018     Alcohol use 06/15/2018     Past Medical History:   Diagnosis Date     Arthritis      Past Surgical History:   Procedure Laterality Date     JOINT REPLACEMENT Left 2019    TKA     OR TOTAL HIP ARTHROPLASTY Right 2018    Procedure: RIGHT TOTAL HIP ARTHROPLASTY, POSTERIOR APPROACH;  Surgeon: Gerard Evans MD;  Location: [facility];  Service: Orthopedics     OR TOTAL HIP ARTHROPLASTY Left 10/2/2019    Procedure: LEFT DIRECT ANTERIOR TOTAL HIP ARTHROPLASTY;  Surgeon: Gerard Evans MD;  Location: [facility];  Service: Orthopedics     OR TOTAL KNEE ARTHROPLASTY Left 2019    Procedure: LEFT TOTAL KNEE ARTHROPLASTY;  Surgeon: Gerard Evans MD;  Location: [facility];  Service: Orthopedics     OR TOTAL KNEE ARTHROPLASTY Right 2020    Procedure: RIGHT TOTAL KNEE ARTHROPLASTY;  Surgeon: Gerard Evans MD;  Location: [facility];  Service: Orthopedics     ROTATOR CUFF REPAIR Right 2019     Social History     Socioeconomic History     Marital status: Single     Spouse name: Not on file     Number of  children: Not on file     Years of education: Not on file     Highest education level: Not on file   Occupational History     Not on file   Social Needs     Financial resource strain: Not on file     Food insecurity     Worry: Not on file     Inability: Not on file     Transportation needs     Medical: Not on file     Non-medical: Not on file   Tobacco Use     Smoking status: Former Smoker     Packs/day: 1.00     Types: Cigarettes     Quit date: 2018     Years since quittin.6     Smokeless tobacco: Never Used   Substance and Sexual Activity     Alcohol use: Not Currently     Drug use: No     Sexual activity: Not on file   Lifestyle     Physical activity     Days per week: Not on file     Minutes per session: Not on file     Stress: Not on file   Relationships     Social connections     Talks on phone: Not on file     Gets together: Not on file     Attends Sikh service: Not on file     Active member of club or organization: Not on file     Attends meetings of clubs or organizations: Not on file     Relationship status: Not on file     Intimate partner violence     Fear of current or ex partner: Not on file     Emotionally abused: Not on file     Physically abused: Not on file     Forced sexual activity: Not on file   Other Topics Concern     Not on file   Social History Narrative     Not on file     History reviewed. No pertinent family history.    Meds:    Current Outpatient Medications:      cholecalciferol, vitamin D3, 25 mcg (1,000 unit) capsule, 1 p.o. daily (Patient taking differently: Take 1,000 Units by mouth daily as needed. 1 p.o. daily), Disp: 90 capsule, Rfl: 0     acetaminophen (TYLENOL) 325 MG tablet, Take 3 tablets (975 mg total) by mouth every 8 (eight) hours., Disp:  , Rfl: 0     gabapentin (NEURONTIN) 100 MG capsule, Take 100-300 mg by mouth 2 (two) times a day as needed., Disp: 90 capsule, Rfl: 1     ibuprofen (ADVIL,MOTRIN) 800 MG tablet, TAKE 1 TABLET(800 MG) BY MOUTH TWICE DAILY AS  NEEDED, Disp: 60 tablet, Rfl: 0     terbinafine HCL (LAMISIL) 250 mg tablet, Take 1 tablet (250 mg total) by mouth daily., Disp: 30 tablet, Rfl: 3    Allergies:  No Known Allergies    ROS:  Pertinent positives as noted in HPI; otherwise 12 point ROS negative.      Physical Exam:  EXAM:  /87 (Patient Site: Right Arm, Patient Position: Sitting, Cuff Size: Adult Regular)   Pulse (!) 101   Temp 97.3  F (36.3  C) (Temporal)   Wt (!) 224 lb 2 oz (101.7 kg)   BMI 29.57 kg/m     Gen:  NAD, appears well, well-hydrated  HEENT:  TMs nl, oropharynx benign, nasal mucosa nl, conjunctiva clear  Neck:  Supple, no adenopathy, no thyromegaly, no carotid bruits, no JVD  Lungs:  Clear to auscultation bilaterally  Cor:  RRR no murmur  Abd:  Soft, nontender, BS+, no masses, no guarding or rebound, no HSM  Extr:  Neg.  Feet:  Decreased sensation at distal feet bilaterally  Neuro:  No asymmetry, Nl motor tone/strength, nl sensation, reflexes =, gait nl, nl coordination, CN intact,   Skin:  Warm/dry        Results:  Results for orders placed or performed in visit on 03/15/21   PSA (Prostatic-Specific Antigen), Annual Screen   Result Value Ref Range    PSA 1.1 0.0 - 3.5 ng/mL   Comprehensive Metabolic Panel   Result Value Ref Range    Sodium 139 136 - 145 mmol/L    Potassium 4.7 3.5 - 5.0 mmol/L    Chloride 100 98 - 107 mmol/L    CO2 27 22 - 31 mmol/L    Anion Gap, Calculation 12 5 - 18 mmol/L    Glucose 93 70 - 125 mg/dL    BUN 11 8 - 22 mg/dL    Creatinine 0.72 0.70 - 1.30 mg/dL    GFR MDRD Af Amer >60 >60 mL/min/1.73m2    GFR MDRD Non Af Amer >60 >60 mL/min/1.73m2    Bilirubin, Total 0.4 0.0 - 1.0 mg/dL    Calcium 9.6 8.5 - 10.5 mg/dL    Protein, Total 7.3 6.0 - 8.0 g/dL    Albumin 4.2 3.5 - 5.0 g/dL    Alkaline Phosphatase 138 (H) 45 - 120 U/L    AST 35 0 - 40 U/L    ALT 41 0 - 45 U/L

## 2021-06-16 PROBLEM — Z78.9 ALCOHOL USE: Status: ACTIVE | Noted: 2018-06-15

## 2021-06-16 PROBLEM — M16.12 PRIMARY OSTEOARTHRITIS OF LEFT HIP: Status: ACTIVE | Noted: 2019-10-02

## 2021-06-16 PROBLEM — M17.11 PRIMARY OSTEOARTHRITIS OF RIGHT KNEE: Status: ACTIVE | Noted: 2020-11-02

## 2021-06-16 PROBLEM — Z87.891 PERSONAL HISTORY OF TOBACCO USE, PRESENTING HAZARDS TO HEALTH: Status: ACTIVE | Noted: 2018-06-15

## 2021-06-16 PROBLEM — Z96.651 STATUS POST TOTAL RIGHT KNEE REPLACEMENT: Status: ACTIVE | Noted: 2020-11-05

## 2021-06-16 PROBLEM — M17.12 PRIMARY OSTEOARTHRITIS OF LEFT KNEE: Status: ACTIVE | Noted: 2019-02-06

## 2021-06-16 PROBLEM — F10.90 ALCOHOL USE: Status: ACTIVE | Noted: 2018-06-15

## 2021-06-16 PROBLEM — M25.562 LEFT KNEE PAIN: Status: ACTIVE | Noted: 2018-06-15

## 2021-06-16 PROBLEM — M16.11 PRIMARY OSTEOARTHRITIS OF RIGHT HIP: Status: ACTIVE | Noted: 2018-09-18

## 2021-06-16 PROBLEM — M25.551 HIP PAIN, RIGHT: Status: ACTIVE | Noted: 2018-06-15

## 2021-06-16 NOTE — TELEPHONE ENCOUNTER
Yes, I have sent in a prescription - I wanted to make sure the PSA was normal before starting medication.  He can check with his pharmacy.

## 2021-06-16 NOTE — TELEPHONE ENCOUNTER
Reason for Call:  Medication or medication refill:    Do you use a [facility]?  Name of the pharmacy and phone number for the current request: [facility]    Name of the medication requested: dr chauhan and patient [facility]. Is she planning on calling this rx in? He received the other rx's.    Other request:he was wondering if dr unit(s);dttosha was waiting for the labs to come in before she prescribes prostate rx?    Can we leave a detailed message on this number? Yes    Phone number patient can be reached at: Home number on file [phone] (home)    Best Time: any    Call taken on 3/19/2021 at 9:24 AM by Rona Grajeda

## 2021-06-18 NOTE — PROGRESS NOTES
Subjective: Patient comes in to establish care.  Past medical history significant for a fractured arm hand laceration and some chronic low back pain.  He had some sciatica symptoms in 2012 did see a spine surgeon never had any surgery.    He had some acute flare in 2015 and since then has had some pain in through the right thigh area as well.    He has been followed by a chiropractor in [facility] he has been on some ibuprofen 800 mg tablets as needed.    He has used a cane since December    Patient additionally has had some problems with his left knee he did see Dr. Evans recently for his right hip and left knee.  The patient has significant deformity to the left knee he did get an injection and aspiration of some joint fluid.  He is following up in a couple weeks.  We will try to get records from [facility] regarding this.    It is unclear if he will need any surgery.  Her graph in addition family history sounds like his sister may have had a brain aneurysm with bleed he will try to get more information that consider MRA for this patient.    He smokes for 36 years over the last 10 years about 2 packs a day.  Drinks alcohol about 20 drinks per week.  (Vodka).    Tobacco status: He  reports that he has been smoking Cigarettes.  He has been smoking about 1.00 pack per day. He has never used smokeless tobacco.    Patient Active Problem List    Diagnosis Date Noted     Family history of diabetes mellitus 06/15/2018     Personal history of tobacco use, presenting hazards to health 06/15/2018     Hip pain, right 06/15/2018     Left knee pain 06/15/2018     Chronic low back pain 06/15/2018     Alcohol use 06/15/2018       Current Outpatient Prescriptions   Medication Sig Dispense Refill     ibuprofen (ADVIL,MOTRIN) 800 MG tablet TK 1 T  PO BID  PRN P  0     No current facility-administered medications for this visit.        ROS:   10 point review of systems negative other than as outlined  "above    Objective:    /86 (Patient Site: Right Arm, Patient Position: Sitting, Cuff Size: Adult Large)  Pulse 98  Temp 98  F (36.7  C) (Oral)   Resp 24  Ht 6' 1\" (1.854 m)  Wt 203 lb (92.1 kg)  SpO2 95% Comment: at rest with room air  BMI 26.78 kg/m2  Body mass index is 26.78 kg/(m^2).    General appearance no acute distress    Vital signs are stable.  O2 sat 95% blood pressure look good.    HEENT neck negative no adenopathy no tenderness    Upper back without pain    Lower back with some mild paraspinal tenderness she does have pain in through the right thigh and hip area.    Deformity to the left knee as discussed with slight effusion.    No significant edema    Lungs are clear no rales or rhonchi O2 sat 95% heart was regular S1-S2 rate at 90.        No results found for this or any previous visit.    Assessment:  1. Screen for colon cancer  Cologuard   2. Immunization counseling  Td, Preservative Free (green label)   3. Personal history of tobacco use, presenting hazards to health     4. Family history of diabetes mellitus     5. Chronic low back pain     6. Left knee pain     7. Hip pain, right     8. Alcohol use       Establish care    We discussed colon cancer screening [place] guard    Immunization update with Td    Discussed quitting smoking and decreasing alcohol    His back pain probably stems from his hip and knee problems.  Will await orthopedic input    Follow-up for physical whether that be preop or if he is not having surgery scheduled for a physical in July.    Plan: As outlined review sister's brain aneurysm and decide on MRA has well.    This transcription uses voice recognition software, which may contain typographical errors.  "

## 2021-06-18 NOTE — PROGRESS NOTES
"Subjective: Patient comes in for physical please see discussion from his first visit a couple weeks ago    Patient drinks about 20 drinks of vodka in a week he smokes 2 packs per day.    Patient has the femoral head gone question avascular necrosis, also significant degenerative arthritis of his left knee.  He sees orthopedist, Dr. Evans I spoke with Dr. Evans regarding the patient.  It sounds like he probably will need surgery.  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    Patient states that he feels some numbness like elastic bandages on the lower legs.  He is not able to ambulate very far I do not palpate any pedal pulses he does have varicose veins more on the right than left leg and get arterial Doppler of the lower extremity.    Patient had a tongue lesion midline posteriorly kind of hypertrophic change and can have him see ENT regarding that.    I did check chest x-ray EKG CMP PSA lipid CBC TSH and vitamin D.  Tobacco status: He  reports that he has been smoking Cigarettes.  He has been smoking about 1.00 pack per day. He has never used smokeless tobacco.    Patient Active Problem List    Diagnosis Date Noted     Family history of diabetes mellitus 06/15/2018     Personal history of tobacco use, presenting hazards to health 06/15/2018     Hip pain, right 06/15/2018     Left knee pain 06/15/2018     Chronic low back pain 06/15/2018     Alcohol use 06/15/2018       Current Outpatient Prescriptions   Medication Sig Dispense Refill     ibuprofen (ADVIL,MOTRIN) 800 MG tablet TK 1 T  PO BID  PRN P  0     No current facility-administered medications for this visit.        ROS:   10 point review of systems negative other than as outlined above    Objective:    /80 (Patient Site: Right Arm, Patient Position: Sitting, Cuff Size: Adult Large)  Pulse (!) 104  Temp 97.6  F (36.4  C) (Oral)   Resp 20  Ht 6' 0.25\" (1.835 m)  Wt 199 lb (90.3 kg)  BMI 26.8 kg/m2  Body mass index is " 26.8 kg/(m^2).      General appearance no acute distress    HEENT neck is supple no adenopathy or bruit oropharynx is clear pupils react normally extract movements full  Patient's tongue had a lesion posteriorly midline hypertrophic await evaluation from ENT.    He is afebrile    Lungs were clear no rales or rhonchi heart was regular S1-S2.  Rate at 100    Chest x-ray showed tortuous aorta, lungs were clear heart size normal.    EKG showed inferior Q waves, left anterior fascicular block.    Abdomen soft bowel sounds normal no guarding or rebound back without CVA pain    Testes descended normally no evidence of hernia    Rectal was done prostate not enlarged no irregularity.    Extremities with the abnormal gait left knee deformity.    Extremities without edema.,  No palpable pulses pedal no atrophic change.  Will check arterial Dopplers    CBC normal TSH PSA normal, AST 47 vitamin D 22    Results for orders placed or performed in visit on 06/21/18   HM2(CBC w/o Differential)   Result Value Ref Range    WBC 6.0 4.0 - 11.0 thou/uL    RBC 5.17 4.40 - 6.20 mill/uL    Hemoglobin 16.3 14.0 - 18.0 g/dL    Hematocrit 49.3 40.0 - 54.0 %    MCV 95 80 - 100 fL    MCH 31.5 27.0 - 34.0 pg    MCHC 33.0 32.0 - 36.0 g/dL    RDW 12.6 11.0 - 14.5 %    Platelets 250 140 - 440 thou/uL    MPV 7.3 7.0 - 10.0 fL   Lipid [place] FASTING   Result Value Ref Range    Cholesterol 217 (H) <=199 mg/dL    Triglycerides 161 (H) <=149 mg/dL    HDL Cholesterol 34 (L) >=40 mg/dL    LDL Calculated 151 (H) <=129 mg/dL    Patient Fasting > 8hrs? Yes    Comprehensive Metabolic Panel   Result Value Ref Range    Sodium 140 136 - 145 mmol/L    Potassium 5.1 (H) 3.5 - 5.0 mmol/L    Chloride 104 98 - 107 mmol/L    CO2 26 22 - 31 mmol/L    Anion Gap, Calculation 10 5 - 18 mmol/L    Glucose 86 70 - 125 mg/dL    BUN 9 8 - 22 mg/dL    Creatinine 0.67 (L) 0.70 - 1.30 mg/dL    GFR MDRD Af Amer >60 >60 mL/min/1.73m2    GFR MDRD Non Af Amer >60 >60 mL/min/1.73m2     Bilirubin, Total 0.7 0.0 - 1.0 mg/dL    Calcium 9.6 8.5 - 10.5 mg/dL    Protein, Total 6.9 6.0 - 8.0 g/dL    Albumin 3.8 3.5 - 5.0 g/dL    Alkaline Phosphatase 145 (H) 45 - 120 U/L    AST 47 (H) 0 - 40 U/L    ALT 41 0 - 45 U/L   PSA, Annual Screen (Prostatic-Specific Antigen)   Result Value Ref Range    PSA 1.2 0.0 - 3.5 ng/mL   Vitamin D, Total (25-Hydroxy)   Result Value Ref Range    Vitamin D, Total (25-Hydroxy) 22.8 (L) 30.0 - 80.0 ng/mL   Thyroid Stimulating Hormone (TSH)   Result Value Ref Range    TSH 1.97 0.30 - 5.00 uIU/mL   Electrocardiogram Perform - Clinic   Result Value Ref Range    SYSTOLIC BLOOD PRESSURE  mmHg    DIASTOLIC BLOOD PRESSURE  mmHg    VENTRICULAR RATE 94 BPM    ATRIAL RATE 94 BPM    P-R INTERVAL 164 ms    QRS DURATION 100 ms    Q-T INTERVAL 344 ms    QTC CALCULATION (BEZET) 430 ms    P Axis 59 degrees    R AXIS -89 degrees    T AXIS 53 degrees    MUSE DIAGNOSIS       Normal sinus rhythm  Pulmonary disease pattern  Left anterior fascicular block  Inferior-posterior infarct , age undetermined  Abnormal ECG  No previous ECGs available  Confirmed by SHELL SILVA MD LOC:[facility] ([id number]) on 6/21/2018 11:04:22 AM       The 10-year ASCVD risk score (Milad FARZANEH Jr, et al., 2013) is: 12.3%    Values used to calculate the score:      Age: 57 years      Sex: Male      Is Non- : No      Diabetic: No      Tobacco smoker: Yes      Systolic Blood Pressure: 110 mmHg      Is BP treated: No      HDL Cholesterol: 34 mg/dL      Total Cholesterol: 217 mg/dL    Assessment:  1. Routine general medical examination at a health care facility  HM2(CBC w/o Differential)    Lipid [place] FASTING    Comprehensive Metabolic Panel    PSA, Annual Screen (Prostatic-Specific Antigen)    Vitamin D, Total (25-Hydroxy)    Thyroid Stimulating Hormone (TSH)   2. Family history of diabetes mellitus     3. Personal history of tobacco use, presenting hazards to health     4. Hip pain, right  XR Chest 2 Views   5.  "Chronic pain of left knee     6. Alcohol use     7. Tongue lesion  Ambulatory referral to ENT   8. Tachycardia  Electrocardiogram Perform - Clinic   9. Pain of left lower leg  US Arterial Legs Bilateral Complete   10. Pain of right lower leg  US Arterial Legs Bilateral Complete     Physical    Family history for diabetes no evidence in the patient    Personal history of smoking needs to quit    Right hip pain with loss of femoral head question avascular necrosis.    Left knee degenerative pain and deformity    Tongue lesion, see ENT.    Hyperlipidemia with increased risk, 12.3% 10 year ASCVD risk score    EKG with some changes.  As outlined Q waves.    Plan: We will contact patient regarding results discussed vitamin D discuss cardiology referral for preop evaluation and abnormal EKG and general risk question need for stress test.    Slight elevated AST history of fairly heavy alcohol use    Tobacco use needs to quit as discussed    Await ENT results    We will discuss statin with the patient.    Await arterial Doppler results    This transcription uses voice recognition software, which may contain typographical errors.        Addendum: 6/26/18.    I contacted the patient regarding the results.  I will start him on atorvastatin 20 million g 1 a day, vitamin D 1000 units a day    He needs to greatly decrease the alcohol and stop tobacco altogether.  He stated that he is  going to \"stop 1 \"program for the smoking.    Arterial ultrasound is pending    Patient was referred to cardiology for preop evaluation for his upcoming major joint surgery  "

## 2021-06-19 NOTE — PROGRESS NOTES
Assessment/ Plan  1. Cutaneous abscess of abdominal wall  Incision and drainage   Cutaneous subcutaneous abscess  Location: Suprapubic region just right of center  History:  Duration: 2 months of small boil, 2 days of enlarging painful  Discomfort: Yes  Associated sx: No fever or chills  Narrative: 57-year-old with history of alcoholism has had a boil in the suprapubic region for some time.  Thought this would go away on its own, it did not and then it exploded over the last 48 hours or so      Exam: 3-4 cm area of redness and induration    Procedure:   Area prepped with antiseptic  Local Anesthesia instilled 10 cc of lidocaine with epinephrine in region  Stab wound made over area of induration and fluctuance and carried down in to center of mass.  Small hemostat used to explore and liberate any sequestrant.  Large amount of brownish odorous purulent material removed.  Cultured?  Yes    About 15 cm of 1/4 inch iodoform guaze was packed in the wound.  Patient instructed to avoid submersion of the wound.  Antibiotics? :  Augmentin 875 twice daily 7 days/    Follow-up 5 days for packing removal presuming things go well    - Culture, Wound  - lidocaine 1%-EPINEPHrine 1:100,000 1 %-1:100,000 injection 10 mL (XYLOCAINE W/EPI); 10 mL by Other route once.    Body mass index is 27.61 kg/(m^2).    Subjective  CC:  Chief Complaint   Patient presents with     boil in groin area     it was small for one month, but the past 3 days it grew up a lot in size     HPI:  Potential Skin infection  Narrative-boil in groin area has been present for the last 2 months or so but got a lot bigger in the last couple of days.      Patient Active Problem List   Diagnosis     Family history of diabetes mellitus     Personal history of tobacco use, presenting hazards to health     Hip pain, right     Left knee pain     Chronic low back pain     Alcohol use     Current medications reviewed as follows:  Current Outpatient Prescriptions on File Prior  to Visit   Medication Sig     atorvastatin (LIPITOR) 20 MG tablet Take 1 tablet (20 mg total) by mouth at bedtime.     cholecalciferol, vitamin D3, (VITAMIN D3) 1,000 unit capsule Take 1 capsule (1,000 Units total) by mouth daily.     ibuprofen (ADVIL,MOTRIN) 800 MG tablet TK 1 T  PO BID  PRN P     varenicline (CHANTIX STARTING MONTH BOX) 0.5 mg (11)- 1 mg (42) tablet 1 wk before you stop smoking take 0.5mg daily on days 1-3, 0.5mg 2 times each day on days 4-7, then 1mg 2 times daily.     varenicline (CHANTIX) 1 mg tablet Take 1 tab by mouth two times a day. Take after eating with a full glass of water. NOTE: Dispense as maintenance for refills only.     No current facility-administered medications on file prior to visit.      History   Smoking Status     Current Every Day Smoker     Packs/day: 1.00     Types: Cigarettes   Smokeless Tobacco     Never Used     Social History     Social History Narrative     Patient Care Team:  Addy Iyv MD as PCP - General (Family Medicine)  ROS  Denies fever, chills, nausea, vomiting      Objective  Physical Exam  Vitals:    07/11/18 1119   BP: 120/88   Pulse: 88   Weight: 205 lb (93 kg)     Patient is alert, oriented, no distress, walks somewhat unsteadily but cautiously  Diagnostics  Wound culture pending    Please note: Voice recognition software was used in this dictation.  It may therefore contain typographical errors.

## 2021-06-19 NOTE — PROGRESS NOTES
[facility] Consultation Note    Thank you, Dr. Addy Ivy MD, for asking the [facility] team to see Wm Gaines in consultation today at our clinic to evaluate preoperative cardiovascular risk.      Assessment:   1.  Abnormal EKG with left anterior fascicular block cannot rule out inferior infarct without anginal symptoms  2.  Low cardiovascular risk for general anesthesia and hip replacement surgery  3.  Hyperlipidemia recently started on statin therapy     Plan:   Okay to proceed with general anesthesia in the near future with no perioperative cardiac monitor during required.  No further cardiac evaluation or follow-up is needed at this time.  Agree with tobacco sensation as well as long-term statin therapy            Current History:   57-year-old male with no previous cardiac history.  Patient has osteoarthritis and needs right hip replacement.  He had a preoperative physical evaluation on  where EKG was obtained.  Patient had sinus rhythm with left anterior fascicular block and cannot rule out inferior infarct pattern.  He denies any chest pain or sinus symptoms suggestive of angina    Past Medical History:   No past medical history on file.  Osteoarthritis and recent diagnosis of hyperlipidemia with a LDL cholesterol of 151 with a HDL cholesterol of 34    Past Surgical History:   No past surgical history on file.  Denies any previous surgeries     Family History:   No family history on file.  Father  at age 77 of postoperative complications after carotid endarterectomy.  Mother  of complications from chronic renal failure.  He has 1 brother one sister neither of whom have known coronary artery disease    Social History:    reports that he has been smoking Cigarettes.  He has been smoking about 1.00 pack per day. He has never used smokeless tobacco.  Greater than a 40-pack-year history tobacco abuse.  He recently quit smoking.  Patient is single and currently  "not employed    Meds:   Scheduled Meds:  PRN Meds:.    Allergies:   Review of patient's allergies indicates no known allergies.    Review of Systems:   General: WNL  Eyes: Visual Distubance  Ears/Nose/Throat: WNL  Lungs: Cough  Heart: Shortness of Breath with activity, Leg Swelling  Stomach: Heartburn  Bladder: Frequent Urination at Night  Muscle/Joints: Joint Pain, Muscle Weakness, Muscle Pain  Skin: WNL  Nervous System: WNL  Mental Health: Depression     Blood: WNL      Objective:      Physical Exam  205 lb (93 kg)  6' 0.25\" (1.835 m)  Body mass index is 27.61 kg/(m^2).  /78 (Patient Site: Left Arm, Patient Position: Sitting, Cuff Size: Adult Large)  Pulse 97  Resp 18  Ht 6' 0.25\" (1.835 m)  Wt 205 lb (93 kg)  BMI 27.61 kg/m2    General Appearance:   alert, no apparent distress   HEENT:  no scleral icterus; the mucous membranes were pink and moist                                  Neck: jugular venous pressure is normal, no thyromegaly   Chest: the spine was straight and the chest was symmetric   Lungs:   respirations unlabored; the lungs are clear to auscultation   Cardiovascular:   regular rhythm with normal first and second heart sounds and no murmurs, clicks, or gallops. Carotid, radial, femoral, and posterior tibial pulses are intact; there are no carotid or femoral bruits.   Abdomen:  no organomegaly, masses, bruits, or tenderness; bowel sounds are present   Extremities: no cyanosis, clubbing, or edema   Skin: no xanthelasma   Neurologic: mood and affect are appropriate         ECG from June 21, 2018 personally reviewed and results as above        Lab Review   Lab Results   Component Value Date     06/21/2018    K 5.1 (H) 06/21/2018     06/21/2018    CO2 26 06/21/2018    BUN 9 06/21/2018    CREATININE 0.67 (L) 06/21/2018    CALCIUM 9.6 06/21/2018     Lab Results   Component Value Date    WBC 4.7 06/26/2018    HGB 16.7 06/26/2018    HCT 48.9 06/26/2018    MCV 93 06/26/2018     " 06/26/2018     Lab Results   Component Value Date    CHOL 217 (H) 06/21/2018    TRIG 161 (H) 06/21/2018    HDL 34 (L) 06/21/2018         Kranthi Brice M.D., F.A.C.C.  [facility]  [phone]

## 2021-06-19 NOTE — PROGRESS NOTES
Subjective: Patient follows up.  He had a abscess in the abdomen which was drained and packed by Dr. Green on 7/12/2018.  He is on Augmentin for 10 days there is no growth on the culture    It is improving he thinks he has had some drainage initially and then not much in the last couple days.    Patient needs to be off cigarettes for 6 weeks before he can have the right hip surgery.  He did see the cardiologist 7/13/2018 and is felt to be okay for surgery I reviewed that.    Patient states he did not want to go on the atorvastatin, wanted to work on diet for cholesterol.  I discussed his risk of 12.3% over the next 10 years regarding an event.  He still wants to try the diet first.    He is now quit smoking since Saturday.    I also discussed the possibility of getting a calcium heart scan to evaluate the coronary artery plaque load worries at right now but he wanted to hold off    Tobacco status: He  reports that he has been smoking Cigarettes.  He has been smoking about 1.00 pack per day. He has never used smokeless tobacco.    Patient Active Problem List    Diagnosis Date Noted     Family history of diabetes mellitus 06/15/2018     Personal history of tobacco use, presenting hazards to health 06/15/2018     Hip pain, right 06/15/2018     Left knee pain 06/15/2018     Chronic low back pain 06/15/2018     Alcohol use 06/15/2018       Current Outpatient Prescriptions   Medication Sig Dispense Refill     amoxicillin-clavulanate (AUGMENTIN) 875-125 mg per tablet Take 1 tablet by mouth 2 (two) times a day. 20 tablet 0     cholecalciferol, vitamin D3, (VITAMIN D3) 1,000 unit capsule Take 1 capsule (1,000 Units total) by mouth daily. 30 capsule 6     ibuprofen (ADVIL,MOTRIN) 800 MG tablet TK 1 T  PO BID  PRN PAIN 40 tablet 2     oxyCODONE (ROXICODONE) 5 MG immediate release tablet as needed.        varenicline (CHANTIX STARTING MONTH BOX) 0.5 mg (11)- 1 mg (42) tablet 1 wk before you stop smoking take 0.5mg daily on  days 1-3, 0.5mg 2 times each day on days 4-7, then 1mg 2 times daily. 53 tablet 0     varenicline (CHANTIX) 1 mg tablet Take 1 tab by mouth two times a day. Take after eating with a full glass of water. NOTE: Dispense as maintenance for refills only. 60 tablet 2     atorvastatin (LIPITOR) 20 MG tablet Take 1 tablet (20 mg total) by mouth at bedtime. (Patient not taking: Reported on 7/16/2018) 30 tablet 3     No current facility-administered medications for this visit.        ROS:   Systems negative other than as outlined above    Objective:    /68 (Patient Site: Left Arm, Patient Position: Sitting, Cuff Size: Adult Large)  Pulse 92  Temp 97.9  F (36.6  C) (Oral)   Wt 200 lb (90.7 kg)  BMI 26.94 kg/m2  Body mass index is 26.94 kg/(m^2).      Abdomen with the packing in place I removed about 3 cm.  Minimal drainage    No significant tenderness    Results for orders placed or performed in visit on 07/11/18   Culture, Wound   Result Value Ref Range    Culture No Growth        Assessment:  1. Cutaneous abscess of abdominal wall     2. Hip pain, right  ibuprofen (ADVIL,MOTRIN) 800 MG tablet   3. Chronic pain of left knee  ibuprofen (ADVIL,MOTRIN) 800 MG tablet   4. Hyperlipidemia LDL goal <100       Continue the antibiotic, hold rest the packing out in 2-3 days    Use warm packs follow-up if any persisting issues    Continue to stay off smoking    Please see above discussion regarding lipids    Plan: As above, total time with patient 15 minutes.  Over 10 minutes spent in counseling reviewing consultation from cardiology, reviewing medications and indication for statin    This transcription uses voice recognition software, which may contain typographical errors.

## 2021-06-20 NOTE — PROGRESS NOTES
Preoperative Exam    Scheduled Procedure: total hip replacement, right side  Surgery Date:  09/18/2018  Surgery Location: [facility], fax [phone]    Surgeon:  Dr. Evans    Assessment/Plan:     1. Preop general physical exam  HM2(CBC w/o Differential)    Basic Metabolic Panel    Electrocardiogram Perform - Clinic    INR   2. Family history of diabetes mellitus     3. Primary osteoarthritis of right hip     4. Chronic pain of left knee     5. Personal history of tobacco use, presenting hazards to health       Surgical Procedure Risk: Low (reported cardiac risk generally < 1%)  Have you had prior anesthesia?: Yes  Have you or any family members had a previous anesthesia reaction:  No  Do you or any family members have a history of a clotting or bleeding disorder?: No  Cardiac Risk Assessment: no increased risk for major cardiac complications    Patient approved for surgery with general or local anesthesia.        Functional Status: Independent  Patient plans to recover at home with family.     Subjective:      Wm Gaines is a 57 y.o. male who presents for a preoperative consultation.  This patient has end-stage degenerative arthritis of the right hip.  He has complete loss/destruction of femoral head.  Patient had bilateral lower extremity arterial Doppler study which was negative for any significant stenosis.  Also has osteoarthritis of left knee.    Patient was seen by Dr. Brice from cardiology, on 7/13/2018.  He stated that the patient was not in any increased cardiac risk for the hip replacement and gave him the okay.  Please see the consultation.    Patient has had previous chest x-ray on 6/21/2018 which is clear.  He did have EKG as well.    Patient has a long history of smoking but has been able to quit for 6 months he has been taking Chantix    He drinks alcohol but limits to 12 drinks per week    10 point review of systems reviewed and are negative, other than those listed in the  HPI.    Pertinent History  Do you have difficulty breathing or chest pain after walking up a flight of stairs: Yes: difficulty breathing, denies any chest pressure  History of obstructive sleep apnea: No  Steroid use in the last 6 months: No  Frequent Aspirin/NSAID use: Yes: ibuprofen  Prior Blood Transfusion: No  Prior Blood Transfusion Reaction: No  If for some reason prior to, during or after the procedure, if it is medically indicated, would you be willing to have a blood transfusion?:  There is no transfusion refusal.    Current Outpatient Prescriptions   Medication Sig Dispense Refill     CHOLECALCIFEROL 1,000 unit tablet Take 1,000 Units by mouth daily.  1     cholecalciferol, vitamin D3, (VITAMIN D3) 1,000 unit capsule Take 1 capsule (1,000 Units total) by mouth daily. 30 capsule 6     ibuprofen (ADVIL,MOTRIN) 800 MG tablet TK 1 T  PO BID  PRN PAIN 40 tablet 2     oxyCODONE (ROXICODONE) 5 MG immediate release tablet as needed.        varenicline (CHANTIX STARTING MONTH BOX) 0.5 mg (11)- 1 mg (42) tablet 1 wk before you stop smoking take 0.5mg daily on days 1-3, 0.5mg 2 times each day on days 4-7, then 1mg 2 times daily. 53 tablet 0     varenicline (CHANTIX) 1 mg tablet Take 1 tab by mouth two times a day. Take after eating with a full glass of water. NOTE: Dispense as maintenance for refills only. 60 tablet 2     amoxicillin-clavulanate (AUGMENTIN) 875-125 mg per tablet Take 1 tablet by mouth 2 (two) times a day. 20 tablet 0     atorvastatin (LIPITOR) 20 MG tablet Take 1 tablet (20 mg total) by mouth at bedtime. 30 tablet 3     No current facility-administered medications for this visit.         No Known Allergies    Patient Active Problem List   Diagnosis     Family history of diabetes mellitus     Personal history of tobacco use, presenting hazards to health     Hip pain, right     Left knee pain     Chronic low back pain     Alcohol use     Past surgical history: None    No family history or personal  "history of any anesthesia or bleeding problems.    Tobacco use has quit smoking for 6 weeks    Social History     Social History     Marital status: Single     Spouse name: N/A     Number of children: N/A     Years of education: N/A     Occupational History     Not on file.     Social History Main Topics     Smoking status: Former Smoker     Packs/day: 1.00     Types: Cigarettes     Quit date: 7/24/2018     Smokeless tobacco: Never Used     Alcohol use Not on file     Drug use: Not on file     Sexual activity: Not on file     Other Topics Concern     Not on file     Social History Narrative             Objective:     Vitals:    09/04/18 1107   BP: 114/80   Pulse: 83   Resp: 16   Temp: 96.8  F (36  C)   TempSrc: Oral   SpO2: 97%   Weight: 214 lb (97.1 kg)   Height: 6' 1\" (1.854 m)         Physical Exam:  General appearance no acute distress    HEENT neck is supple no adenopathy oropharynx is clear pupils react normally extract movements full    Lungs have slightly diminished breath sounds but are clear    Heart was regular rate and 80s    O2 sat 97%    Abdomen is nontender no guarding rebound    Skin was normal no rashes    Extremities with no significant edema, he does have bilateral varicosities.    He has pain through the left knee.    Significant pain through the right hip    X-rays as discussed above regarding knee and hip    He also had a chest x-ray from 6/21/2018 which was clear of infiltrate.    Patient had bilateral arterial ultrasound which was normal in the lower extremities.      EKG: EKG reviewed and agree with reading below    Labs: INR pending, BMP pending these will be faxed.    CBC normal    EKG as outlined  Recent Results (from the past 24 hour(s))   HM2(CBC w/o Differential)    Collection Time: 09/04/18 11:45 AM   Result Value Ref Range    WBC 4.9 4.0 - 11.0 thou/uL    RBC 4.70 4.40 - 6.20 mill/uL    Hemoglobin 14.5 14.0 - 18.0 g/dL    Hematocrit 43.3 40.0 - 54.0 %    MCV 92 80 - 100 fL    MCH " 30.9 27.0 - 34.0 pg    MCHC 33.6 32.0 - 36.0 g/dL    RDW 14.0 11.0 - 14.5 %    Platelets 242 140 - 440 thou/uL    MPV 6.8 (L) 7.0 - 10.0 fL   Electrocardiogram Perform - Clinic    Collection Time: 09/04/18 12:04 PM   Result Value Ref Range    SYSTOLIC BLOOD PRESSURE  mmHg    DIASTOLIC BLOOD PRESSURE  mmHg    VENTRICULAR RATE 78 BPM    ATRIAL RATE 78 BPM    P-R INTERVAL 172 ms    QRS DURATION 112 ms    Q-T INTERVAL 374 ms    QTC CALCULATION (BEZET) 426 ms    P Axis 57 degrees    R AXIS -63 degrees    T AXIS 47 degrees    MUSE DIAGNOSIS       Normal sinus rhythm  Left anterior fascicular block  Abnormal ECG  When compared with ECG of 21-JUN-2018 10:20,  Criteria for Inferior-posterior infarct are no longer Present         Immunization History   Administered Date(s) Administered     Td, adult adsorbed, PF 06/14/2018           Electronically signed by Addy Ivy MD 09/04/18 11:09 AM

## 2021-06-20 NOTE — ANESTHESIA PREPROCEDURE EVALUATION
Anesthesia Evaluation      Patient summary reviewed   No history of anesthetic complications     Airway   Mallampati: I  Neck ROM: full   Pulmonary - normal exam   (+) a smoker                         Cardiovascular - negative ROS and normal exam  ECG reviewed        Neuro/Psych - negative ROS     Endo/Other    (+) arthritis,      GI/Hepatic/Renal    (+) GERD intermittent,             Dental    (+) upper dentures                       Anesthesia Plan  Planned anesthetic: spinal    ASA 2     Anesthetic plan and risks discussed with: patient    Post-op plan: routine recovery

## 2021-06-20 NOTE — ANESTHESIA POSTPROCEDURE EVALUATION
Patient: Wm Gaines  RIGHT TOTAL HIP ARTHROPLASTY, POSTERIOR APPROACH  Anesthesia type: spinal    Patient location: PACU  Last vitals:   Vitals:    09/18/18 1240   BP: (!) 134/91   Pulse: 80   Resp: 19   Temp:    SpO2: 94%     Post vital signs: stable  Level of consciousness: awake and responds to simple questions  Post-anesthesia pain: pain controlled  Post-anesthesia nausea and vomiting: no  Pulmonary: unassisted, return to baseline  Cardiovascular: stable and blood pressure at baseline  Hydration: adequate  Anesthetic events: no    QCDR Measures:  ASA# 11 - Deena-op Cardiac Arrest: ASA11B - Patient did NOT experience unanticipated cardiac arrest  ASA# 12 - Deena-op Mortality Rate: ASA12B - Patient did NOT die  ASA# 13 - PACU Re-Intubation Rate: NA - No ETT / LMA used for case  ASA# 10 - Composite Anes Safety: ASA10A - No serious adverse event    Additional Notes:

## 2021-06-20 NOTE — PROGRESS NOTES
"TCM DISCHARGE FOLLOW UP CALL    Discharge Date:  9/21/2018  Reason for hospital stay (discharge diagnosis)::  R ALDO  Are you feeling better, the same or worse since your discharge?:  Patient is feeling better (Pt rates his pain \"2/10\" more incisional pain.\"  Doing his exercises.)  Do you feel like you have a plan in the event of a health emergency?: Yes (\"Call 911 if emergency; call [facility] for appt.\"  RN informed pt of 24/7 nurse triage services through the clinic.)    As part of your discharge plan, were  home care services ordered for you?: No    Did you receive any new medications, or was there a change to your medications?: Yes    Are you taking those medications, or do you have any established regiment?:  Yes - Tylenol 1000 mg three times a day,  mg two times a day, docusate sodium 1 tab two times a day prn, & Miralax daily prn.  Pt states he is only taking 500 mg of Tylenol two times a day, &  mg once a day (didn't realize he was supposed to be taking it twice a day; will start twice a day dosing today); he is also taking Celebrex once a day for 10 days.  He has not needed to take the stool softeners as he is having regular BMs.  Pt confirmed having stopped taking Ibuprofen & Oxycodone, as instructed.  Do you have any follow up visits scheduled with your PCP or Specialist?:  Yes, with Specialist  Who are you seeing and when is it scheduled?:  Brett Osgood PA-C (ortho)   9/28/2018  I'm glad to hear you're doing well and we want you to continue to do well. Your PCP would like to see you for a follow-up visit. Can we help set that up for your today?: No (Pt states he will wait to schedule a f/u w/Dr. Ivy until after seeing ortho on Friday; if needed, he will call clinic & schedule.)    (RN) Provided patient the PCP's phone number to call if they have any questions or concerns?: Yes        Keisha Hardy RN Care Manager, Population Health    "

## 2021-06-20 NOTE — ANESTHESIA CARE TRANSFER NOTE
Last vitals:   Vitals:    09/18/18 1152   BP: 115/66   Pulse: 96   Resp: 22   Temp: 37.3  C (99.2  F)   SpO2: 92%     Patient's level of consciousness is awake  Spontaneous respirations: yes  Maintains airway independently: yes  Dentition unchanged: yes  Oropharynx: oropharynx clear of all foreign objects    QCDR Measures:  ASA# 20 - Surgical Safety Checklist: WHO surgical safety checklist completed prior to induction  PQRS# 430 - Adult PONV Prevention: 4558F - Pt received => 2 anti-emetic agents (different classes) preop & intraop  ASA# 8 - Peds PONV Prevention: NA - Not pediatric patient, not GA or 2 or more risk factors NOT present  PQRS# 424 - Deena-op Temp Management: 4559F - At least one body temp DOCUMENTED => 35.5C or 95.9F within required timeframe  PQRS# 426 - PACU Transfer Protocol: - Transfer of care checklist used  ASA# 14 - Acute Post-op Pain: ASA14B - Patient did NOT experience pain >= 7 out of 10

## 2021-06-23 NOTE — ANESTHESIA PROCEDURE NOTES
Peripheral Block    Patient location during procedure: pre-op  Start time: 2/6/2019 11:07 AM  End time: 2/6/2019 11:09 AM  post-op analgesia per surgeon order as noted in medical record  Staffing:  Performing  Anesthesiologist: Chidi Eckert IV, MD  Preanesthetic Checklist  Completed: patient identified, site marked, risks, benefits, and alternatives discussed, timeout performed, consent obtained, at patient's request, airway assessed, oxygen available, suction available, emergency drugs available and hand hygiene performed  Peripheral Block  Block type: saphenous  Prep: ChloraPrep  Patient position: supine  Patient monitoring: cardiac monitor, continuous pulse oximetry, heart rate and blood pressure  Laterality: left  Injection technique: ultrasound guided            Needle  Needle type: echogenic   Needle gauge: 20G  Needle length: 4 in  no peripheral nerve catheter placed  Assessment  Injection assessment: no difficulty with injection, negative aspiration for heme, no paresthesia on injection and incremental injection

## 2021-06-23 NOTE — ANESTHESIA PREPROCEDURE EVALUATION
Anesthesia Evaluation      Patient summary reviewed   No history of anesthetic complications     Airway   Mallampati: II  Neck ROM: full   Pulmonary - negative ROS and normal exam                          Cardiovascular - negative ROS and normal exam   Neuro/Psych - negative ROS     Endo/Other - negative ROS      GI/Hepatic/Renal - negative ROS           Dental                         Anesthesia Plan  Planned anesthetic: spinal and peripheral nerve block    ASA 3     Anesthetic plan and risks discussed with: patient

## 2021-06-23 NOTE — PROGRESS NOTES
Preoperative Exam    Scheduled Procedure: Total left knee replacement   Surgery Date:  02/05/2019  Surgery Location: [facility], fax [phone]    Surgeon:  Dr. Evans    Assessment/Plan:     1. Preop general physical exam  XR Chest 2 Views    Electrocardiogram Perform - Clinic    Basic Metabolic Panel    HM2(CBC w/o Differential)   2. Osteoarthritis of left knee, unspecified osteoarthritis type  ibuprofen (ADVIL,MOTRIN) 800 MG tablet   3. Family history of diabetes mellitus     4. Primary osteoarthritis of right hip     5. Alcohol use  INR   6. Hyperlipidemia LDL goal <100     7. Tobacco use  XR Chest 2 Views       Surgical Procedure Risk: Low (reported cardiac risk generally < 1%)  Have you had prior anesthesia?: Yes  Have you or any family members had a previous anesthesia reaction:  No  Do you or any family members have a history of a clotting or bleeding disorder?: No  Cardiac Risk Assessment: no increased risk for major cardiac complications    Patient approved for surgery with general or local anesthesia.        Functional Status: Independent  Patient plans to recover at home with family.     Subjective:      Wm Gaines is a 58 y.o. male who presents for a preoperative consultation.  This patient has severe osteoarthritis he just had his right hip replaced in September this was successful.    He was seen by cardiology on 7/13/2018, Dr. Brice, who okayed the patient for the right total hip arthroplasty.    He now comes in for evaluation regarding his left knee he has grade 4 osteoarthritic changes with severe valgus deformity and effusion which is chronic.  He also has 2-3+ degenerative changes on the right knee.  Plan is for left total knee arthroplasty.    Patient has smoked intermittently he quit for quite a while and then he smoked again after his surgery in September but quit in early December again    He takes ibuprofen for the pain but will stop this 3 weeks prior to the procedure    He  drinks 5-6 drinks per week regarding alcohol.    No other issues at this time        10 point review of systems reviewed and are negative, other than those listed in the HPI.    Pertinent History  Do you have difficulty breathing or chest pain after walking up a flight of stairs: No  History of obstructive sleep apnea: No  Steroid use in the last 6 months: No  Frequent Aspirin/NSAID use: Yes: Pt uses ibuprofen , Will hold 3 weeks prior to the procedure.  Prior Blood Transfusion: No  Prior Blood Transfusion Reaction: No  If for some reason prior to, during or after the procedure, if it is medically indicated, would you be willing to have a blood transfusion?:  There is no transfusion refusal.    Current Outpatient Medications   Medication Sig Dispense Refill     acetaminophen (TYLENOL) 500 MG tablet Take 2 tablets (1,000 mg total) by mouth 3 (three) times a day.  0     cholecalciferol, vitamin D3, (VITAMIN D3) 1,000 unit capsule Take 1 capsule (1,000 Units total) by mouth daily. 30 capsule 6     celecoxib (CELEBREX) 200 MG capsule Take 200 mg by mouth 2 (two) times a day.  0     docusate sodium (COLACE) 100 MG capsule Take 1 capsule (100 mg total) by mouth 2 (two) times a day as needed for constipation.  0     ibuprofen (ADVIL,MOTRIN) 800 MG tablet TAKE 1 TABLET BY MOUTH TWICE DAILY AS NEEDED FOR PAIN 40 tablet 0     polyethylene glycol (MIRALAX) 17 gram packet Take 1 packet (17 g total) by mouth daily as needed.  0     No current facility-administered medications for this visit.         No Known Allergies    Patient Active Problem List   Diagnosis     Family history of diabetes mellitus     Personal history of tobacco use, presenting hazards to health     Hip pain, right     Left knee pain     Chronic low back pain     Alcohol use     Primary osteoarthritis of right hip       Past Medical History:   Diagnosis Date     Arthritis        Past Surgical History:   Procedure Laterality Date     [place] TOTAL HIP ARTHROPLASTY  "Right 2018    Procedure: RIGHT TOTAL HIP ARTHROPLASTY, POSTERIOR APPROACH;  Surgeon: Gerard Evans MD;  Location: [facility] Main OR;  Service: Orthopedics     No personal or family history for any anesthesia or bleeding problems      Social History     Socioeconomic History     Marital status: Single     Spouse name: Not on file     Number of children: Not on file     Years of education: Not on file     Highest education level: Not on file   Social Needs     Financial resource strain: Not on file     Food insecurity - worry: Not on file     Food insecurity - inability: Not on file     Transportation needs - medical: Not on file     Transportation needs - non-medical: Not on file   Occupational History     Not on file   Tobacco Use     Smoking status: Former Smoker     Packs/day: 1.00     Types: Cigarettes     Last attempt to quit: 2018     Years since quittin.4     Smokeless tobacco: Never Used   Substance and Sexual Activity     Alcohol use: Yes     Alcohol/week: 7.2 oz     Types: 12 Shots of liquor per week     Comment: per week     Drug use: No     Sexual activity: Not on file   Other Topics Concern     Not on file   Social History Narrative     Not on file         Objective:     Vitals:    19 1346   BP: 116/78   Pulse: 98   Resp: 16   SpO2: 98%   Weight: 222 lb (100.7 kg)   Height: 6' 2\" (1.88 m)         Physical Exam:  General appearance no acute distress    Vital signs were stable as outlined above O2 sat look good    Neck was negative no adenopathy oropharynx is clear pupils react normally he does have upper dentures on exam of his teeth.    Lungs are clear no rales or rhonchi, heart was regular S1-S2.  Rate was at 85-90    Abdomen soft nontender no guarding or rebound    Extremities with trace edema on the right he does have an effusion of the left knee with some swelling negative Homans sign no redness or warmth.  Skin was normal no rash no open wounds  Significant valgus deformity " noted.      EKG: Agree with reading below, no change from September, 2018    Labs: INR pending, BMP pending    CBC normal      Recent Results (from the past 24 hour(s))   HM2(CBC w/o Differential)    Collection Time: 01/14/19  2:27 PM   Result Value Ref Range    WBC 4.9 4.0 - 11.0 thou/uL    RBC 4.55 4.40 - 6.20 mill/uL    Hemoglobin 14.0 14.0 - 18.0 g/dL    Hematocrit 42.3 40.0 - 54.0 %    MCV 93 80 - 100 fL    MCH 30.7 27.0 - 34.0 pg    MCHC 33.1 32.0 - 36.0 g/dL    RDW 12.9 11.0 - 14.5 %    Platelets 278 140 - 440 thou/uL    MPV 7.0 7.0 - 10.0 fL   Electrocardiogram Perform - Clinic    Collection Time: 01/14/19  2:38 PM   Result Value Ref Range    SYSTOLIC BLOOD PRESSURE  mmHg    DIASTOLIC BLOOD PRESSURE  mmHg    VENTRICULAR RATE 87 BPM    ATRIAL RATE 87 BPM    P-R INTERVAL 164 ms    QRS DURATION 104 ms    Q-T INTERVAL 356 ms    QTC CALCULATION (BEZET) 428 ms    P Axis 56 degrees    R AXIS -78 degrees    T AXIS 54 degrees    MUSE DIAGNOSIS       Normal sinus rhythm  Left anterior fascicular block  Abnormal ECG  When compared with ECG of 04-SEP-2018 12:04,  No significant change was found         Immunization History   Administered Date(s) Administered     Td, adult adsorbed, PF 06/14/2018           Electronically signed by Addy Ivy MD 01/14/19 1:48 PM

## 2021-06-23 NOTE — ANESTHESIA PROCEDURE NOTES
Peripheral Block    Patient location during procedure: pre-op  Start time: 2/6/2019 11:05 AM  End time: 2/6/2019 11:07 AM  post-op analgesia per surgeon order as noted in medical record  Staffing:  Performing  Anesthesiologist: Chidi Eckert IV, MD  Preanesthetic Checklist  Completed: patient identified, site marked, risks, benefits, and alternatives discussed, timeout performed, consent obtained, at patient's request, airway assessed, oxygen available, suction available, emergency drugs available and hand hygiene performed  Peripheral Block  Block type: sciatic, popliteal  Prep: ChloraPrep  Patient position: right lateral decubitus  Patient monitoring: cardiac monitor, continuous pulse oximetry, blood pressure and heart rate  Laterality: left  Injection technique: ultrasound guided            Needle  Needle type: echogenic   Needle gauge: 20G  Needle length: 4 in  no peripheral nerve catheter placed  Assessment  Injection assessment: negative aspiration for heme, no paresthesia on injection, incremental injection and no difficulty with injection

## 2021-06-23 NOTE — ANESTHESIA PROCEDURE NOTES
Spinal Block    Patient location during procedure: OR  Start time: 2/6/2019 1:05 PM  End time: 2/6/2019 1:07 PM  Reason for block: primary anesthetic    Staffing:  Performing  Anesthesiologist: Chidi Eckert IV, MD    Preanesthetic Checklist  Completed: patient identified, risks, benefits, and alternatives discussed, timeout performed, consent obtained, at patient's request, airway assessed, oxygen available, suction available, emergency drugs available and hand hygiene performed  Spinal Block  Patient position: sitting  Prep: ChloraPrep  Patient monitoring: heart rate, cardiac monitor, continuous pulse ox and blood pressure  Approach: right paramedian  Location: L4-5  Injection technique: single-shot  Needle type: Quincke   Needle gauge: 25 G

## 2021-06-23 NOTE — ANESTHESIA POSTPROCEDURE EVALUATION
Patient: Wm Gaines  LEFT TOTAL KNEE ARTHROPLASTY  Anesthesia type: spinal    Patient location: PACU  Last vitals:   Vitals:    02/06/19 1630   BP: 119/84   Pulse: 91   Resp: 16   Temp: 36.7  C (98  F)   SpO2: 97%     Post vital signs: stable  Level of consciousness: awake and responds to simple questions  Post-anesthesia pain: pain controlled  Post-anesthesia nausea and vomiting: no  Pulmonary: unassisted, return to baseline  Cardiovascular: stable and blood pressure at baseline  Hydration: adequate  Anesthetic events: no    QCDR Measures:  ASA# 11 - Deena-op Cardiac Arrest: ASA11B - Patient did NOT experience unanticipated cardiac arrest  ASA# 12 - Deena-op Mortality Rate: ASA12B - Patient did NOT die  ASA# 13 - PACU Re-Intubation Rate: NA - No ETT / LMA used for case  ASA# 10 - Composite Anes Safety: ASA10A - No serious adverse event    Additional Notes:

## 2021-06-23 NOTE — ANESTHESIA CARE TRANSFER NOTE
Last vitals:   Vitals:    02/06/19 1530   BP: 100/66   Pulse: 86   Resp: 16   Temp: 36.2  C (97.1  F)   SpO2: 96%     Patient's level of consciousness is awake  Spontaneous respirations: yes  Maintains airway independently: yes  Dentition unchanged: yes  Oropharynx: oropharynx clear of all foreign objects    QCDR Measures:  ASA# 20 - Surgical Safety Checklist: WHO surgical safety checklist completed prior to induction    PQRS# 430 - Adult PONV Prevention: 4558F - Pt received => 2 anti-emetic agents (different classes) preop & intraop  ASA# 8 - Peds PONV Prevention: NA - Not pediatric patient, not GA or 2 or more risk factors NOT present  PQRS# 424 - Deena-op Temp Management: 4559F - At least one body temp DOCUMENTED => 35.5C or 95.9F within required timeframe  PQRS# 426 - PACU Transfer Protocol: - Transfer of care checklist used  ASA# 14 - Acute Post-op Pain: ASA14B - Patient did NOT experience pain >= 7 out of 10

## 2021-06-24 NOTE — PROGRESS NOTES
Subjective: Patient comes in for hospital discharge follow-up.  He had a left total knee arthroplasty.  He was at [facility] from February 6 through the eighth.    He is back on Celebrex that seems to be controlling pain he did take oxycodone for a short time    Is also on his vitamin D and aspirin    They had some aspiration from the knee at the time of surgery and this is come back negative thus far.    He did see the orthopedist yesterday.  He has no external staples or sutures.    He will be going to therapy    Patient had a hemoglobin down to 8.9 in the hospital this was rechecked today please see below.    Denies any fever chills denies any drainage from the leg.  His energy is improving    Tobacco status: He  reports that he quit smoking about 6 months ago. His smoking use included cigarettes. He smoked 1.00 pack per day. he has never used smokeless tobacco.    Patient Active Problem List    Diagnosis Date Noted     Primary osteoarthritis of left knee 02/06/2019     Primary osteoarthritis of right hip 09/18/2018     Family history of diabetes mellitus 06/15/2018     Personal history of tobacco use, presenting hazards to health 06/15/2018     Hip pain, right 06/15/2018     Left knee pain 06/15/2018     Chronic low back pain 06/15/2018     Alcohol use 06/15/2018       Current Outpatient Medications   Medication Sig Dispense Refill     acetaminophen (TYLENOL) 500 MG tablet Take 2 tablets (1,000 mg total) by mouth 3 (three) times a day.  0     aspirin 325 MG tablet Take 1 tablet (325 mg total) by mouth 2 (two) times a day. Take for 30 days after surgery, then resume taking aspirin 81mg daily.  0     celecoxib (CELEBREX) 200 MG capsule Take 1 capsule (200 mg total) by mouth 2 (two) times a day. 20 capsule 0     cholecalciferol, vitamin D3, (VITAMIN D3) 1,000 unit capsule Take 1 capsule (1,000 Units total) by mouth daily. 30 capsule 6     docusate sodium (COLACE) 100 MG capsule Take 1 capsule (100 mg total)  "by mouth 2 (two) times a day as needed for constipation.  0     oxyCODONE (ROXICODONE) 5 MG immediate release tablet Take 1-2 tablets (5-10 mg total) by mouth every 4 (four) hours as needed. 42 tablet 0     polyethylene glycol (MIRALAX) 17 gram packet Take 1 packet (17 g total) by mouth daily as needed.  0     No current facility-administered medications for this visit.        ROS:   10 point review of systems positive as outlined above otherwise negative    Objective:    /62   Pulse 97   Temp 98  F (36.7  C) (Oral)   Resp 16   Ht 6' 2\" (1.88 m)   Wt 221 lb (100.2 kg)   SpO2 99%   BMI 28.37 kg/m    Body mass index is 28.37 kg/m .      General appearance no acute distress    HEENT neck negative heart was regular rate at 90    Abdomen nontender no guarding or rebound.    Extremities significant for the left total knee arthroplasty.  No drainage dressing intact.  Negative Homans sign    Hemoglobin up to 10.2 white count normal    Results for orders placed or performed in visit on 02/19/19   HM2(CBC w/o Differential)   Result Value Ref Range    WBC 6.8 4.0 - 11.0 thou/uL    RBC 3.36 (L) 4.40 - 6.20 mill/uL    Hemoglobin 10.2 (L) 14.0 - 18.0 g/dL    Hematocrit 30.6 (L) 40.0 - 54.0 %    MCV 91 80 - 100 fL    MCH 30.3 27.0 - 34.0 pg    MCHC 33.3 32.0 - 36.0 g/dL    RDW 14.1 11.0 - 14.5 %    Platelets 450 (H) 140 - 440 thou/uL    MPV 6.0 (L) 7.0 - 10.0 fL       Assessment:  1. Hospital discharge follow-up     2. Primary osteoarthritis of left knee     3. Anemia, unspecified type  HM2(CBC w/o Differential)     Status post left total knee arthroplasty stable    Anemia improved    Plan: As discussed above.  Follow-up here as needed    This transcription uses voice recognition software, which may contain typographical errors.  "

## 2021-06-24 NOTE — TELEPHONE ENCOUNTER
----- Message from Addy Ivy MD sent at 2/20/2019  1:38 PM CST -----  Please contact this patient, let him know that the hemoglobin is up to 10.2 which is good    He does not need any further follow-up on this.  Regular visit

## 2021-06-24 NOTE — PROGRESS NOTES
TCM DISCHARGE FOLLOW UP CALL    Discharge Date:  2/8/2019  Reason for hospital stay (discharge diagnosis)::  (L) TKA  Are you feeling better, the same or worse since your discharge?:  Patient is feeling better (Incision has pressure dsg. No drainage trough it. Pain is managable. Toes don't have increased numbness, toes are usuually cool.. no constipation)  Do you feel like you have a plan in the event of a health emergency?: Yes (Gave info on nurse triage.)    As part of your discharge plan, were  home care services ordered for you?: No    Did you receive any new medications, or was there a change to your medications?: Yes    Are you taking those medications, or do you have any established regiment?:  Taking ASA two times a day. Managing pain with one Tylenol alternates with BID celebrex.  Do you have any follow up visits scheduled with your PCP or Specialist?:  Yes, with PCP and Yes, with Specialist (Dr Ivy 2/19. )  (RN) Is PCP appt scheduled soon enough (within 14 days of discharge date)?: Yes    Who are you seeing and when is it scheduled?:  2/18 Ortho

## 2021-06-26 ENCOUNTER — HEALTH MAINTENANCE LETTER (OUTPATIENT)
Age: 61
End: 2021-06-26

## 2021-07-03 NOTE — ADDENDUM NOTE
Addendum Note by Addy Overton MD at 6/26/2018  5:29 PM     Author: Addy Overton MD Service: -- Author Type: Physician    Filed: 6/26/2018  5:29 PM Encounter Date: 6/21/2018 Status: Signed    : Addy Overton MD (Physician)    Addended by: ADDY OVERTON on: 6/26/2018 05:29 PM        Modules accepted: Orders

## 2021-07-03 NOTE — ADDENDUM NOTE
Addendum Note by Addy Overton MD at 10/14/2019 10:30 AM     Author: Addy Overton MD Service: -- Author Type: Physician    Filed: 10/14/2019  5:40 PM Encounter Date: 10/14/2019 Status: Signed    : Addy Overton MD (Physician)    Addended by: ADDY OVERTON on: 10/14/2019 05:40 PM        Modules accepted: Level of Service

## 2021-08-27 DIAGNOSIS — E56.9 VITAMIN DEFICIENCY: ICD-10-CM

## 2021-08-27 DIAGNOSIS — M17.12 PRIMARY OSTEOARTHRITIS OF LEFT KNEE: ICD-10-CM

## 2021-08-27 NOTE — TELEPHONE ENCOUNTER
Reason for Call:  Medication or medication refill:    Do you use a [facility]?  Name of the pharmacy and phone number for the current request: new location please  [facility]   ([place] [address])    Name of the medication requested:  ibuprofen 800mg  Vitamin D3 25 mcg      Other request: no    Can we leave a detailed message on this number? YES    Phone number patient can be reached at: Home number on file [phone] (home)    Best Time: anytime  Call taken on 8/27/2021 at 3:28 PM by Colleen Bunch

## 2021-08-29 NOTE — TELEPHONE ENCOUNTER
"Routing refill request to provider for review/approval because:  Labs not current:  CBC    Last Written Prescription Date:  1/10/21  Last Fill Quantity: 60,  # refills: 0   Last office visit provider:  3/15/21     Requested Prescriptions   Pending Prescriptions Disp Refills     ibuprofen (ADVIL/MOTRIN) 800 MG tablet 60 tablet 0       NSAID Medications Failed - 8/27/2021  3:41 PM        Failed - Normal CBC on file in past 12 months     Recent Labs   Lab Test 11/05/20  0617 11/02/20  1417   WBC  --  6.0   RBC  --  4.63   HGB 11.9* 15.3   HCT  --  45.7   PLT  --  266                 Passed - Blood pressure under 140/90 in past 12 months     BP Readings from Last 3 Encounters:   03/15/21 130/87   11/02/20 125/87   09/18/20 136/88                 Passed - Normal ALT on file in past 12 months     Recent Labs   Lab Test 03/15/21  1400   ALT 41             Passed - Normal AST on file in past 12 months     Recent Labs   Lab Test 03/15/21  1400   AST 35             Passed - Recent (12 mo) or future (30 days) visit within the authorizing provider's specialty     Patient has had an office visit with the authorizing provider or a provider within the authorizing providers department within the previous 12 mos or has a future within next 30 days. See \"Patient Info\" tab in inbasket, or \"Choose Columns\" in Meds & Orders section of the refill encounter.              Passed - Patient is age 6-64 years        Passed - Medication is active on med list        Passed - Normal serum creatinine on file in past 12 months     Recent Labs   Lab Test 03/15/21  1400   CR 0.72       Ok to refill medication if creatinine is low             cholecalciferol (VITAMIN D3) 25 mcg (1000 units) capsule 90 capsule 0       Vitamin Supplements (Adult) Protocol Passed - 8/27/2021  3:41 PM        Passed - High dose Vitamin D not ordered        Passed - Recent (12 mo) or future (30 days) visit within the authorizing provider's specialty     Patient has had an " "office visit with the authorizing provider or a provider within the authorizing providers department within the previous 12 mos or has a future within next 30 days. See \"Patient Info\" tab in inbasket, or \"Choose Columns\" in Meds & Orders section of the refill encounter.              Passed - Medication is active on med list             obdulia zuleta RN 08/28/21 9:02 PM  "

## 2021-08-30 RX ORDER — IBUPROFEN 800 MG/1
TABLET, FILM COATED ORAL
Qty: 60 TABLET | Refills: 0 | Status: SHIPPED | OUTPATIENT
Start: 2021-08-30 | End: 2021-12-24

## 2021-08-30 NOTE — TELEPHONE ENCOUNTER
Patient called to check on the status of this refill. Please send refill to [facility] at [address]

## 2021-10-16 ENCOUNTER — HEALTH MAINTENANCE LETTER (OUTPATIENT)
Age: 61
End: 2021-10-16

## 2021-12-22 DIAGNOSIS — M17.12 PRIMARY OSTEOARTHRITIS OF LEFT KNEE: ICD-10-CM

## 2021-12-22 NOTE — TELEPHONE ENCOUNTER
Reason for Call:  Medication or medication refill:    Do you use a [facility]?  Name of the pharmacy and phone number for the current request:  [facility] [address]    Name of the medication requested:   Ibuprofen 800 mg    Other request: was [facility] [address] would [facility] return  [facility]  [facility] [address]    Can we leave a detailed message on this number? YES    Phone number patient can be reached at: Home number on file [phone] (home)    Best Time: anytime   Call taken on 12/22/2021 at 12:25 PM by Colleen Bunch

## 2021-12-24 RX ORDER — IBUPROFEN 800 MG/1
TABLET, FILM COATED ORAL
Qty: 60 TABLET | Refills: 0 | Status: SHIPPED | OUTPATIENT
Start: 2021-12-24 | End: 2022-08-15

## 2021-12-24 NOTE — TELEPHONE ENCOUNTER
Medication is being filled for 1 time refill only due to:  Patient needs to be seen because need labs.

## 2022-07-23 ENCOUNTER — HEALTH MAINTENANCE LETTER (OUTPATIENT)
Age: 62
End: 2022-07-23

## 2022-08-13 ASSESSMENT — ENCOUNTER SYMPTOMS
MYALGIAS: 0
SHORTNESS OF BREATH: 1
COUGH: 0
DYSURIA: 0
EYE PAIN: 0
HEARTBURN: 0
HEADACHES: 0
HEMATOCHEZIA: 0
DIZZINESS: 0
ARTHRALGIAS: 0
DIARRHEA: 0
CHILLS: 0
CONSTIPATION: 0
FEVER: 0
WEAKNESS: 1
HEMATURIA: 0
JOINT SWELLING: 0
NERVOUS/ANXIOUS: 0
PARESTHESIAS: 1
FREQUENCY: 1
ABDOMINAL PAIN: 0
NAUSEA: 0
PALPITATIONS: 0
SORE THROAT: 0

## 2022-08-15 ENCOUNTER — OFFICE VISIT (OUTPATIENT)
Dept: FAMILY MEDICINE | Facility: CLINIC | Age: 62
End: 2022-08-15
Payer: COMMERCIAL

## 2022-08-15 VITALS
OXYGEN SATURATION: 97 % | SYSTOLIC BLOOD PRESSURE: 121 MMHG | TEMPERATURE: 98.1 F | WEIGHT: 219 LBS | BODY MASS INDEX: 29.03 KG/M2 | HEIGHT: 73 IN | HEART RATE: 95 BPM | RESPIRATION RATE: 24 BRPM | DIASTOLIC BLOOD PRESSURE: 83 MMHG

## 2022-08-15 DIAGNOSIS — Z11.4 SCREENING FOR HIV (HUMAN IMMUNODEFICIENCY VIRUS): ICD-10-CM

## 2022-08-15 DIAGNOSIS — L84 CALLUS OF FOOT: ICD-10-CM

## 2022-08-15 DIAGNOSIS — Z12.11 SCREEN FOR COLON CANCER: ICD-10-CM

## 2022-08-15 DIAGNOSIS — M21.372 LEFT FOOT DROP: ICD-10-CM

## 2022-08-15 DIAGNOSIS — M17.12 PRIMARY OSTEOARTHRITIS OF LEFT KNEE: ICD-10-CM

## 2022-08-15 DIAGNOSIS — Z00.00 ADULT GENERAL MEDICAL EXAM: Primary | ICD-10-CM

## 2022-08-15 DIAGNOSIS — Z83.3 FAMILY HISTORY OF DIABETES MELLITUS: ICD-10-CM

## 2022-08-15 DIAGNOSIS — E55.9 VITAMIN D DEFICIENCY: ICD-10-CM

## 2022-08-15 DIAGNOSIS — Z11.59 NEED FOR HEPATITIS C SCREENING TEST: ICD-10-CM

## 2022-08-15 DIAGNOSIS — M54.16 LUMBAR RADICULOPATHY: ICD-10-CM

## 2022-08-15 DIAGNOSIS — G62.9 NEUROPATHY: ICD-10-CM

## 2022-08-15 DIAGNOSIS — Z87.891 PERSONAL HISTORY OF TOBACCO USE, PRESENTING HAZARDS TO HEALTH: ICD-10-CM

## 2022-08-15 LAB
ALBUMIN SERPL BCG-MCNC: 4.6 G/DL (ref 3.5–5.2)
ALP SERPL-CCNC: 114 U/L (ref 40–129)
ALT SERPL W P-5'-P-CCNC: 38 U/L (ref 10–50)
ANION GAP SERPL CALCULATED.3IONS-SCNC: 9 MMOL/L (ref 7–15)
AST SERPL W P-5'-P-CCNC: 41 U/L (ref 10–50)
BILIRUB SERPL-MCNC: 0.4 MG/DL
BUN SERPL-MCNC: 9.3 MG/DL (ref 8–23)
CALCIUM SERPL-MCNC: 9.9 MG/DL (ref 8.6–10)
CHLORIDE SERPL-SCNC: 99 MMOL/L (ref 98–107)
CREAT SERPL-MCNC: 0.65 MG/DL (ref 0.67–1.17)
DEPRECATED CALCIDIOL+CALCIFEROL SERPL-MC: 32 UG/L (ref 20–75)
DEPRECATED HCO3 PLAS-SCNC: 29 MMOL/L (ref 22–29)
GFR SERPL CREATININE-BSD FRML MDRD: >90 ML/MIN/1.73M2
GLUCOSE SERPL-MCNC: 94 MG/DL (ref 70–99)
HBA1C MFR BLD: 5.7 % (ref 0–5.6)
HCV AB SERPL QL IA: NONREACTIVE
HIV 1+2 AB+HIV1 P24 AG SERPL QL IA: NONREACTIVE
POTASSIUM SERPL-SCNC: 4.8 MMOL/L (ref 3.4–5.3)
PROT SERPL-MCNC: 7.7 G/DL (ref 6.4–8.3)
SODIUM SERPL-SCNC: 137 MMOL/L (ref 136–145)
TSH SERPL DL<=0.005 MIU/L-ACNC: 2.23 UIU/ML (ref 0.3–4.2)
VIT B12 SERPL-MCNC: 719 PG/ML (ref 232–1245)

## 2022-08-15 PROCEDURE — 86803 HEPATITIS C AB TEST: CPT | Performed by: FAMILY MEDICINE

## 2022-08-15 PROCEDURE — 99396 PREV VISIT EST AGE 40-64: CPT | Performed by: FAMILY MEDICINE

## 2022-08-15 PROCEDURE — 82306 VITAMIN D 25 HYDROXY: CPT | Performed by: FAMILY MEDICINE

## 2022-08-15 PROCEDURE — 83036 HEMOGLOBIN GLYCOSYLATED A1C: CPT | Performed by: FAMILY MEDICINE

## 2022-08-15 PROCEDURE — 84443 ASSAY THYROID STIM HORMONE: CPT | Performed by: FAMILY MEDICINE

## 2022-08-15 PROCEDURE — 82607 VITAMIN B-12: CPT | Performed by: FAMILY MEDICINE

## 2022-08-15 PROCEDURE — 80053 COMPREHEN METABOLIC PANEL: CPT | Performed by: FAMILY MEDICINE

## 2022-08-15 PROCEDURE — 36415 COLL VENOUS BLD VENIPUNCTURE: CPT | Performed by: FAMILY MEDICINE

## 2022-08-15 PROCEDURE — 87389 HIV-1 AG W/HIV-1&-2 AB AG IA: CPT | Performed by: FAMILY MEDICINE

## 2022-08-15 RX ORDER — IBUPROFEN 800 MG/1
TABLET, FILM COATED ORAL
Qty: 60 TABLET | Refills: 0 | Status: SHIPPED | OUTPATIENT
Start: 2022-08-15 | End: 2022-12-13

## 2022-08-15 RX ORDER — VARENICLINE TARTRATE 1 MG/1
1 TABLET, FILM COATED ORAL 2 TIMES DAILY
Qty: 180 TABLET | Refills: 0 | Status: SHIPPED | OUTPATIENT
Start: 2022-08-15 | End: 2022-09-16

## 2022-08-15 ASSESSMENT — ENCOUNTER SYMPTOMS
NERVOUS/ANXIOUS: 0
DIARRHEA: 0
COUGH: 0
ABDOMINAL PAIN: 0
MYALGIAS: 0
FEVER: 0
FREQUENCY: 1
HEADACHES: 0
EYE PAIN: 0
DIZZINESS: 0
CHILLS: 0
SORE THROAT: 0
WEAKNESS: 1
CONSTIPATION: 0
SHORTNESS OF BREATH: 1
NAUSEA: 0
DYSURIA: 0
ARTHRALGIAS: 0
HEMATOCHEZIA: 0
JOINT SWELLING: 0
PARESTHESIAS: 1
HEMATURIA: 0
HEARTBURN: 0
PALPITATIONS: 0

## 2022-09-06 ENCOUNTER — TRANSFERRED RECORDS (OUTPATIENT)
Dept: HEALTH INFORMATION MANAGEMENT | Facility: CLINIC | Age: 62
End: 2022-09-06

## 2022-09-12 DIAGNOSIS — Z87.891 PERSONAL HISTORY OF TOBACCO USE, PRESENTING HAZARDS TO HEALTH: ICD-10-CM

## 2022-09-12 NOTE — TELEPHONE ENCOUNTER
"Routing refill request to provider for review/approval because:  ? If starting box should be reordered    Last Written Prescription Date:  8/15/22  Last Fill Quantity: 53,  # refills: 0   Last office visit provider:  8/15/22     Requested Prescriptions   Pending Prescriptions Disp Refills     varenicline (CHANTIX LIZETH) 0.5 MG X 11 & 1 MG X 42 tablet [Pharmacy Med Name: Varenicline Starting Month Box] 53 each      Sig: Take 1 tablet (0.5mg) by mouth daily for 3 days, then take 1 tablet (0.5mg) by mouth twice daily for 4 days, then take 1 tablet (1mg) by mouth twice daily.       Partial Cholinergic Nicotinic Agonist Agents Passed - 9/12/2022 11:01 AM        Passed - Blood pressure under 140/90 in past 12 months     BP Readings from Last 3 Encounters:   08/15/22 121/83   03/15/21 130/87   11/02/20 125/87                 Passed - Recent (12 mo) or future (30 days) visit within the authorizing provider's specialty     Patient has had an office visit with the authorizing provider or a provider within the authorizing providers department within the previous 12 mos or has a future within next 30 days. See \"Patient Info\" tab in inbasket, or \"Choose Columns\" in Meds & Orders section of the refill encounter.              Passed - Medication is active on med list        Passed - Patient is 18 years of age or older             Jojo Serrano RN 09/12/22 4:15 PM  "

## 2022-09-16 DIAGNOSIS — Z87.891 PERSONAL HISTORY OF TOBACCO USE, PRESENTING HAZARDS TO HEALTH: ICD-10-CM

## 2022-09-16 RX ORDER — VARENICLINE TARTRATE 1 MG/1
1 TABLET, FILM COATED ORAL 2 TIMES DAILY
Qty: 180 TABLET | Refills: 0 | Status: SHIPPED | OUTPATIENT
Start: 2022-09-16 | End: 2023-04-05

## 2022-09-16 NOTE — TELEPHONE ENCOUNTER
Routing refill request to provider for review/approval because:  Need to verify if patient should continue on Chanix 1 mg tablet or reduce to .5mg tablet?    varenicline (CHANTIX) 1 MG tablet Take 1 tablet (1 mg) by mouth 2 times daily      Message routed to Dr Hamilton for refill request.    Liberty Curry RN  [facility]

## 2022-10-01 ENCOUNTER — HEALTH MAINTENANCE LETTER (OUTPATIENT)
Age: 62
End: 2022-10-01

## 2022-12-11 DIAGNOSIS — M17.12 PRIMARY OSTEOARTHRITIS OF LEFT KNEE: ICD-10-CM

## 2022-12-12 NOTE — TELEPHONE ENCOUNTER
"Routing refill request to provider for review/approval because:  Labs out of range:  cr  Labs not current:  cbc    Last Written Prescription Date:  8/15/22  Last Fill Quantity: 60,  # refills: 0   Last office visit provider:  8/15/22     Requested Prescriptions   Pending Prescriptions Disp Refills     ibuprofen (ADVIL/MOTRIN) 800 MG tablet [Pharmacy Med Name: Ibuprofen 800mg Tablet] 60 tablet 0     Sig: Take 1 tablet by mouth twice daily as needed.       NSAID Medications Failed - 12/11/2022  4:02 PM        Failed - Normal CBC on file in past 12 months     Recent Labs   Lab Test 11/05/20  0617 11/02/20  1417   WBC  --  6.0   RBC  --  4.63   HGB 11.9* 15.3   HCT  --  45.7   PLT  --  266                 Failed - Normal serum creatinine on file in past 12 months     Recent Labs   Lab Test 08/15/22  1210   CR 0.65*       Ok to refill medication if creatinine is low          Passed - Blood pressure under 140/90 in past 12 months     BP Readings from Last 3 Encounters:   08/15/22 121/83   03/15/21 130/87   11/02/20 125/87                 Passed - Normal ALT on file in past 12 months     Recent Labs   Lab Test 08/15/22  1210   ALT 38             Passed - Normal AST on file in past 12 months     Recent Labs   Lab Test 08/15/22  1210   AST 41             Passed - Recent (12 mo) or future (30 days) visit within the authorizing provider's specialty     Patient has had an office visit with the authorizing provider or a provider within the authorizing providers department within the previous 12 mos or has a future within next 30 days. See \"Patient Info\" tab in inbasket, or \"Choose Columns\" in Meds & Orders section of the refill encounter.              Passed - Patient is age 6-64 years        Passed - Medication is active on med Jojo Lewis RN 12/12/22 1:01 PM  "

## 2022-12-13 RX ORDER — IBUPROFEN 800 MG/1
TABLET, FILM COATED ORAL
Qty: 60 TABLET | Refills: 0 | Status: SHIPPED | OUTPATIENT
Start: 2022-12-13 | End: 2022-12-26

## 2022-12-25 DIAGNOSIS — M17.12 PRIMARY OSTEOARTHRITIS OF LEFT KNEE: ICD-10-CM

## 2022-12-26 RX ORDER — IBUPROFEN 800 MG/1
TABLET, FILM COATED ORAL
Qty: 60 TABLET | Refills: 0 | Status: SHIPPED | OUTPATIENT
Start: 2022-12-26 | End: 2023-01-20

## 2022-12-26 NOTE — TELEPHONE ENCOUNTER
"Routing refill request to provider for review/approval because:  Labs out of range:  Hemoglobin, early request    Last Written Prescription Date:  12/13/22  Last Fill Quantity: 60  # refills: 0  Last office visit provider:  8/15/22    Requested Prescriptions   Pending Prescriptions Disp Refills     ibuprofen (ADVIL/MOTRIN) 800 MG tablet [Pharmacy Med Name: Ibuprofen 800mg Tablet] 60 tablet 0     Sig: Take 1 tablet by mouth twice daily as needed.       NSAID Medications Failed - 12/25/2022  8:07 PM        Failed - Normal CBC on file in past 12 months     Recent Labs   Lab Test 11/05/20  0617 11/02/20  1417   WBC  --  6.0   RBC  --  4.63   HGB 11.9* 15.3   HCT  --  45.7   PLT  --  266                 Failed - Normal serum creatinine on file in past 12 months     Recent Labs   Lab Test 08/15/22  1210   CR 0.65*       Ok to refill medication if creatinine is low          Passed - Blood pressure under 140/90 in past 12 months     BP Readings from Last 3 Encounters:   08/15/22 121/83   03/15/21 130/87   11/02/20 125/87                 Passed - Normal ALT on file in past 12 months     Recent Labs   Lab Test 08/15/22  1210   ALT 38             Passed - Normal AST on file in past 12 months     Recent Labs   Lab Test 08/15/22  1210   AST 41             Passed - Recent (12 mo) or future (30 days) visit within the authorizing provider's specialty     Patient has had an office visit with the authorizing provider or a provider within the authorizing providers department within the previous 12 mos or has a future within next 30 days. See \"Patient Info\" tab in inbasket, or \"Choose Columns\" in Meds & Orders section of the refill encounter.              Passed - Patient is age 6-64 years        Passed - Medication is active on med list             Ines Griffin RN 12/25/22 8:38 PM  "

## 2023-01-18 DIAGNOSIS — M17.12 PRIMARY OSTEOARTHRITIS OF LEFT KNEE: ICD-10-CM

## 2023-01-19 NOTE — TELEPHONE ENCOUNTER
"Routing refill request to provider for review/approval because:  Labs not current:  CBC, Serum Creatinine    Last Written Prescription Date:  12/26/2022  Last Fill Quantity: 60,  # refills: 0   Last office visit provider:  08/15/2022     Requested Prescriptions   Pending Prescriptions Disp Refills     ibuprofen (ADVIL/MOTRIN) 800 MG tablet [Pharmacy Med Name: Ibuprofen 800mg Tablet] 60 tablet 0     Sig: Take 1 tablet by mouth twice daily as needed.       NSAID Medications Failed - 1/19/2023  2:26 PM        Failed - Normal CBC on file in past 12 months     Recent Labs   Lab Test 11/05/20  0617 11/02/20  1417   WBC  --  6.0   RBC  --  4.63   HGB 11.9* 15.3   HCT  --  45.7   PLT  --  266                 Failed - Normal serum creatinine on file in past 12 months     Recent Labs   Lab Test 08/15/22  1210   CR 0.65*       Ok to refill medication if creatinine is low          Passed - Blood pressure under 140/90 in past 12 months     BP Readings from Last 3 Encounters:   08/15/22 121/83   03/15/21 130/87   11/02/20 125/87                 Passed - Normal ALT on file in past 12 months     Recent Labs   Lab Test 08/15/22  1210   ALT 38             Passed - Normal AST on file in past 12 months     Recent Labs   Lab Test 08/15/22  1210   AST 41             Passed - Recent (12 mo) or future (30 days) visit within the authorizing provider's specialty     Patient has had an office visit with the authorizing provider or a provider within the authorizing providers department within the previous 12 mos or has a future within next 30 days. See \"Patient Info\" tab in inbasket, or \"Choose Columns\" in Meds & Orders section of the refill encounter.              Passed - Patient is age 6-64 years        Passed - Medication is active on med list             Suzanne Arellano RN 01/19/23 2:26 PM  "

## 2023-01-20 RX ORDER — IBUPROFEN 800 MG/1
TABLET, FILM COATED ORAL
Qty: 60 TABLET | Refills: 0 | Status: SHIPPED | OUTPATIENT
Start: 2023-01-20 | End: 2023-03-10

## 2023-03-08 DIAGNOSIS — M17.12 PRIMARY OSTEOARTHRITIS OF LEFT KNEE: ICD-10-CM

## 2023-03-09 NOTE — TELEPHONE ENCOUNTER
"Routing refill request to provider for review/approval because:  Labs out of range:  creatinine  Labs not current:  CBC    Last Written Prescription Date:  1/20/2023  Last Fill Quantity: 60,  # refills: 0   Last office visit provider:  8/15/2022     Requested Prescriptions   Pending Prescriptions Disp Refills     ibuprofen (ADVIL/MOTRIN) 800 MG tablet [Pharmacy Med Name: Ibuprofen 800mg Tablet] 60 tablet 0     Sig: Take 1 tablet by mouth twice daily as needed.       NSAID Medications Failed - 3/8/2023 12:33 PM        Failed - Normal CBC on file in past 12 months     Recent Labs   Lab Test 11/05/20  0617 11/02/20  1417   WBC  --  6.0   RBC  --  4.63   HGB 11.9* 15.3   HCT  --  45.7   PLT  --  266                 Failed - Normal serum creatinine on file in past 12 months     Recent Labs   Lab Test 08/15/22  1210   CR 0.65*       Ok to refill medication if creatinine is low          Passed - Blood pressure under 140/90 in past 12 months     BP Readings from Last 3 Encounters:   08/15/22 121/83   03/15/21 130/87   11/02/20 125/87                 Passed - Normal ALT on file in past 12 months     Recent Labs   Lab Test 08/15/22  1210   ALT 38             Passed - Normal AST on file in past 12 months     Recent Labs   Lab Test 08/15/22  1210   AST 41             Passed - Recent (12 mo) or future (30 days) visit within the authorizing provider's specialty     Patient has had an office visit with the authorizing provider or a provider within the authorizing providers department within the previous 12 mos or has a future within next 30 days. See \"Patient Info\" tab in inbasket, or \"Choose Columns\" in Meds & Orders section of the refill encounter.              Passed - Patient is age 6-64 years        Passed - Medication is active on med list             Karen Viera RN 03/09/23 5:14 PM  "

## 2023-03-10 RX ORDER — IBUPROFEN 800 MG/1
TABLET, FILM COATED ORAL
Qty: 60 TABLET | Refills: 0 | Status: SHIPPED | OUTPATIENT
Start: 2023-03-10 | End: 2023-04-05

## 2023-03-20 ENCOUNTER — TRANSFERRED RECORDS (OUTPATIENT)
Dept: HEALTH INFORMATION MANAGEMENT | Facility: CLINIC | Age: 63
End: 2023-03-20
Payer: COMMERCIAL

## 2023-03-22 ENCOUNTER — TRANSFERRED RECORDS (OUTPATIENT)
Dept: HEALTH INFORMATION MANAGEMENT | Facility: CLINIC | Age: 63
End: 2023-03-22
Payer: COMMERCIAL

## 2023-04-05 ENCOUNTER — OFFICE VISIT (OUTPATIENT)
Dept: FAMILY MEDICINE | Facility: CLINIC | Age: 63
End: 2023-04-05
Payer: COMMERCIAL

## 2023-04-05 VITALS
HEART RATE: 102 BPM | HEIGHT: 73 IN | RESPIRATION RATE: 20 BRPM | DIASTOLIC BLOOD PRESSURE: 81 MMHG | SYSTOLIC BLOOD PRESSURE: 114 MMHG | WEIGHT: 222 LBS | OXYGEN SATURATION: 97 % | BODY MASS INDEX: 29.42 KG/M2

## 2023-04-05 DIAGNOSIS — Z87.891 PERSONAL HISTORY OF TOBACCO USE, PRESENTING HAZARDS TO HEALTH: ICD-10-CM

## 2023-04-05 DIAGNOSIS — Z01.818 PREOP EXAMINATION: Primary | ICD-10-CM

## 2023-04-05 DIAGNOSIS — M54.16 LUMBAR RADICULOPATHY: ICD-10-CM

## 2023-04-05 DIAGNOSIS — M21.372 LEFT FOOT DROP: ICD-10-CM

## 2023-04-05 LAB
ANION GAP SERPL CALCULATED.3IONS-SCNC: 13 MMOL/L (ref 7–15)
BUN SERPL-MCNC: 13 MG/DL (ref 8–23)
CALCIUM SERPL-MCNC: 9.8 MG/DL (ref 8.8–10.2)
CHLORIDE SERPL-SCNC: 99 MMOL/L (ref 98–107)
CREAT SERPL-MCNC: 0.58 MG/DL (ref 0.67–1.17)
DEPRECATED HCO3 PLAS-SCNC: 25 MMOL/L (ref 22–29)
GFR SERPL CREATININE-BSD FRML MDRD: >90 ML/MIN/1.73M2
GLUCOSE SERPL-MCNC: 93 MG/DL (ref 70–99)
HGB BLD-MCNC: 14.4 G/DL (ref 13.3–17.7)
POTASSIUM SERPL-SCNC: 4.6 MMOL/L (ref 3.4–5.3)
SODIUM SERPL-SCNC: 137 MMOL/L (ref 136–145)

## 2023-04-05 PROCEDURE — 80048 BASIC METABOLIC PNL TOTAL CA: CPT | Performed by: FAMILY MEDICINE

## 2023-04-05 PROCEDURE — 99214 OFFICE O/P EST MOD 30 MIN: CPT | Performed by: FAMILY MEDICINE

## 2023-04-05 PROCEDURE — 36415 COLL VENOUS BLD VENIPUNCTURE: CPT | Performed by: FAMILY MEDICINE

## 2023-04-05 PROCEDURE — 85018 HEMOGLOBIN: CPT | Performed by: FAMILY MEDICINE

## 2023-04-05 NOTE — H&P (VIEW-ONLY)
[facility] [address] [address] [place] [address]  Phone: [phone]  Fax: [phone]  Primary Provider: Roz López  Pre-op Performing Provider: ROZ LÓPEZ      PREOPERATIVE EVALUATION:  Today's date: 4/5/2023    Wm Gaines is a 62 year old male who presents for a preoperative evaluation.      4/5/2023     1:44 PM   Additional Questions   Roomed by Maura     Surgical Information:  Surgery/Procedure: Spine Surgery, laminectomy, 2 level fusion (L3-5)  Surgery Location: [facility]  Surgeon: Dr. Rico  Surgery Date: 4/27/23  Time of Surgery: TBD  Where patient plans to recover: Other: Staying over night in the hospital  Fax number for surgical facility: Note does not need to be faxed, will be available electronically in Epic.    Assessment & Plan     The proposed surgical procedure is considered INTERMEDIATE risk.    Problem List Items Addressed This Visit        Behavioral    Personal history of tobacco use, presenting hazards to health   Other Visit Diagnoses     Preop examination    -  Primary    Relevant Orders    Basic metabolic panel  (Ca, Cl, CO2, Creat, Gluc, K, Na, BUN) (Completed)    Hemoglobin (Completed)    Left foot drop        Lumbar radiculopathy            This is a 63 yo male who is anticipating spine surgery.  Here for preop assessment.   Patient has h/o tobacco use - quit smoking in October 2022.  Patient has longstanding symptoms related to lumbar disc disease - now with left foot drop and weakness in left lower leg.  OK for surgery as planned.      Risks and Recommendations:  The patient has the following additional risks and recommendations for perioperative complications:   - No identified additional risk factors other than previously addressed    Medication Instructions:  Patient is on no chronic medications    RECOMMENDATION:  APPROVAL GIVEN to proceed with proposed procedure, without further diagnostic  evaluation.            Subjective     HPI related to upcoming procedure: 2012 - slipped a disc - no previous surgeries; did fairly well until now - started losing balance - losing strength in left leg -           4/5/2023     1:05 PM   Preop Questions   1. Have you ever had a heart attack or stroke? No   2. Have you ever had surgery on your heart or blood vessels, such as a stent placement, a coronary artery bypass, or surgery on an artery in your head, neck, heart, or legs? No   3. Do you have chest pain with activity? No   4. Do you have a history of  heart failure? No   5. Do you currently have a cold, bronchitis or symptoms of other infection? No   6. Do you have a cough, shortness of breath, or wheezing? No   7. Do you or anyone in your family have previous history of blood clots? No   8. Do you or does anyone in your family have a serious bleeding problem such as prolonged bleeding following surgeries or cuts? No   9. Have you ever had problems with anemia or been told to take iron pills? No   10. Have you had any abnormal blood loss such as black, tarry or bloody stools? No   11. Have you ever had a blood transfusion? No   12. Are you willing to have a blood transfusion if it is medically needed before, during, or after your surgery? Yes   13. Have you or any of your relatives ever had problems with anesthesia? No   14. Do you have sleep apnea, excessive snoring or daytime drowsiness? No   15. Do you have any artifical heart valves or other implanted medical devices like a pacemaker, defibrillator, or continuous glucose monitor? No   16. Do you have artificial joints? YES - both hips, both knees    17. Are you allergic to latex? No       Health Care Directive:  Patient does not have a Health Care Directive or Living Will: no written documents     Preoperative Review of :   reviewed - no record of controlled substances prescribed.      Status of Chronic Conditions:  See problem list for active medical  problems.  Problems all longstanding and stable, except as noted/documented.  See ROS for pertinent symptoms related to these conditions.      Review of Systems   Constitutional: Negative for chills and fever.   HENT: Negative.    Eyes: Negative for visual disturbance.   Respiratory: Negative for cough and shortness of breath.    Cardiovascular: Negative for chest pain and peripheral edema.   Gastrointestinal: Negative.  Negative for abdominal pain.   Endocrine: Negative for polydipsia and polyuria.   Genitourinary: Negative.    Musculoskeletal: Positive for back pain and gait problem (left foot drop).   Allergic/Immunologic: Negative.    Neurological: Positive for weakness (left lower extremity).   Hematological: Does not bruise/bleed easily.   Psychiatric/Behavioral: Negative.    All other systems reviewed and are negative.      No meds  No smoking since October 2022   Rare EtOH  No drugs       Patient Active Problem List    Diagnosis Date Noted     Status post total right knee replacement 11/05/2020     Priority: Medium     Primary osteoarthritis of right knee 11/02/2020     Priority: Medium     Primary osteoarthritis of left hip 10/02/2019     Priority: Medium     Primary osteoarthritis of left knee 02/06/2019     Priority: Medium     Primary osteoarthritis of right hip 09/18/2018     Priority: Medium     Family history of diabetes mellitus 06/15/2018     Priority: Medium     Personal history of tobacco use, presenting hazards to health 06/15/2018     Priority: Medium     Hip pain, right 06/15/2018     Priority: Medium     Left knee pain 06/15/2018     Priority: Medium     Chronic low back pain 06/15/2018     Priority: Medium     Alcohol use 06/15/2018     Priority: Medium      Past Medical History:   Diagnosis Date     Arthritis      Past Surgical History:   Procedure Laterality Date     ARTHROSCOPY SHOULDER ROTATOR CUFF REPAIR Right 11/01/2019     JOINT REPLACEMENT Left 02/06/2019    TKA     SOFT TISSUE  "SURGERY  2019    Rotator cuff surgery left shoulder     [facility] TOTAL HIP ARTHROPLASTY Right 2018    Procedure: RIGHT TOTAL HIP ARTHROPLASTY, POSTERIOR APPROACH;  Surgeon: Gerard Evans MD;  Location: [facility] OR;  Service: Orthopedics     [facility] TOTAL HIP ARTHROPLASTY Left 10/02/2019    Procedure: LEFT DIRECT ANTERIOR TOTAL HIP ARTHROPLASTY;  Surgeon: Gerard Evans MD;  Location: [facility] Main OR;  Service: Orthopedics     [facility] TOTAL KNEE ARTHROPLASTY Left 2019    Procedure: LEFT TOTAL KNEE ARTHROPLASTY;  Surgeon: Gerard Evans MD;  Location: [facility] Main OR;  Service: Orthopedics     [facility] TOTAL KNEE ARTHROPLASTY Right 2020    Procedure: RIGHT TOTAL KNEE ARTHROPLASTY;  Surgeon: Gerard Evans MD;  Location: [facility] OR;  Service: Orthopedics     No current outpatient medications on file.       No Known Allergies     Social History     Tobacco Use     Smoking status: Former     Packs/day: 2.00     Years: 42.00     Pack years: 84.00     Types: Cigarettes     Start date: 1980     Quit date: 10/1/2022     Years since quittin.5     Smokeless tobacco: Never   Vaping Use     Vaping status: Not on file   Substance Use Topics     Alcohol use: Not Currently     No family history on file.  History   Drug Use No         Objective     /81 (BP Location: Right arm, Patient Position: Sitting, Cuff Size: Adult Regular)   Pulse 102   Resp 20   Ht 1.848 m (6' 0.75\")   Wt 100.7 kg (222 lb)   SpO2 97%   BMI 29.49 kg/m      Physical Exam  Vitals reviewed.   Constitutional:       General: He is not in acute distress.     Appearance: Normal appearance.   HENT:      Head: Normocephalic.      Right Ear: Tympanic membrane, ear canal and external ear normal.      Left Ear: Tympanic membrane, ear canal and external ear normal.      Nose: Nose normal.      Mouth/Throat:      Mouth: Mucous membranes are moist.      Pharynx: No posterior oropharyngeal erythema.   Eyes:      Extraocular " Movements: Extraocular movements intact.      Conjunctiva/sclera: Conjunctivae normal.      Pupils: Pupils are equal, round, and reactive to light.   Cardiovascular:      Rate and Rhythm: Normal rate and regular rhythm.      Pulses: Normal pulses.      Heart sounds: Normal heart sounds. No murmur heard.  Pulmonary:      Effort: Pulmonary effort is normal.      Breath sounds: Normal breath sounds.   Abdominal:      Palpations: Abdomen is soft. There is no mass.      Tenderness: There is no abdominal tenderness. There is no guarding or rebound.   Musculoskeletal:         General: No deformity. Normal range of motion.      Cervical back: Normal range of motion and neck supple.   Lymphadenopathy:      Cervical: No cervical adenopathy.   Skin:     General: Skin is warm and dry.   Neurological:      General: No focal deficit present.      Mental Status: He is alert and oriented to person, place, and time.      Motor: Weakness (left lower extremity weakness; left foot drop) present.   Psychiatric:         Mood and Affect: Mood normal.         Behavior: Behavior normal.           Recent Labs   Lab Test 08/15/22  1210      POTASSIUM 4.8   CR 0.65*   A1C 5.7*        Diagnostics:  Recent Results (from the past 168 hour(s))   Basic metabolic panel  (Ca, Cl, CO2, Creat, Gluc, K, Na, BUN)    Collection Time: 04/05/23  2:31 PM   Result Value Ref Range    Sodium 137 136 - 145 mmol/L    Potassium 4.6 3.4 - 5.3 mmol/L    Chloride 99 98 - 107 mmol/L    Carbon Dioxide (CO2) 25 22 - 29 mmol/L    Anion Gap 13 7 - 15 mmol/L    Urea Nitrogen 13.0 8.0 - 23.0 mg/dL    Creatinine 0.58 (L) 0.67 - 1.17 mg/dL    Calcium 9.8 8.8 - 10.2 mg/dL    Glucose 93 70 - 99 mg/dL    GFR Estimate >90 >60 mL/min/1.73m2   Hemoglobin    Collection Time: 04/05/23  2:31 PM   Result Value Ref Range    Hemoglobin 14.4 13.3 - 17.7 g/dL          Revised Cardiac Risk Index (RCRI):  The patient has the following serious cardiovascular risks for perioperative  complications:   - No serious cardiac risks = 0 points     RCRI Interpretation: 1 point: Class II (low risk - 0.9% complication rate)           Signed Electronically by: MARLON TRIVEDI MD  Copy of this evaluation report is provided to requesting physician.

## 2023-04-05 NOTE — PROGRESS NOTES
[facility] [address] [place] [address]  Phone: [phone]  Fax: [phone]  Primary Provider: Roz López  Pre-op Performing Provider: ROZ LÓPEZ      PREOPERATIVE EVALUATION:  Today's date: 4/5/2023    Wm Gaines is a 62 year old male who presents for a preoperative evaluation.      4/5/2023     1:44 PM   Additional Questions   Roomed by Maura     Surgical Information:  Surgery/Procedure: Spine Surgery, laminectomy, 2 level fusion (L3-5)  Surgery Location: [facility]  Surgeon: Dr. Rico  Surgery Date: 4/27/23  Time of Surgery: TBD  Where patient plans to recover: Other: Staying over night in the hospital  Fax number for surgical facility: Note does not need to be faxed, will be available electronically in Epic.    Assessment & Plan     The proposed surgical procedure is considered INTERMEDIATE risk.    Problem List Items Addressed This Visit        Behavioral    Personal history of tobacco use, presenting hazards to health   Other Visit Diagnoses     Preop examination    -  Primary    Relevant Orders    Basic metabolic panel  (Ca, Cl, CO2, Creat, Gluc, K, Na, BUN) (Completed)    Hemoglobin (Completed)    Left foot drop        Lumbar radiculopathy            This is a 63 yo male who is anticipating spine surgery.  Here for preop assessment.   Patient has h/o tobacco use - quit smoking in October 2022.  Patient has longstanding symptoms related to lumbar disc disease - now with left foot drop and weakness in left lower leg.  OK for surgery as planned.      Risks and Recommendations:  The patient has the following additional risks and recommendations for perioperative complications:   - No identified additional risk factors other than previously addressed    Medication Instructions:  Patient is on no chronic medications    RECOMMENDATION:  APPROVAL GIVEN to proceed with proposed procedure, without further diagnostic  evaluation.            Subjective     HPI related to upcoming procedure: 2012 - slipped a disc - no previous surgeries; did fairly well until now - started losing balance - losing strength in left leg -           4/5/2023     1:05 PM   Preop Questions   1. Have you ever had a heart attack or stroke? No   2. Have you ever had surgery on your heart or blood vessels, such as a stent placement, a coronary artery bypass, or surgery on an artery in your head, neck, heart, or legs? No   3. Do you have chest pain with activity? No   4. Do you have a history of  heart failure? No   5. Do you currently have a cold, bronchitis or symptoms of other infection? No   6. Do you have a cough, shortness of breath, or wheezing? No   7. Do you or anyone in your family have previous history of blood clots? No   8. Do you or does anyone in your family have a serious bleeding problem such as prolonged bleeding following surgeries or cuts? No   9. Have you ever had problems with anemia or been told to take iron pills? No   10. Have you had any abnormal blood loss such as black, tarry or bloody stools? No   11. Have you ever had a blood transfusion? No   12. Are you willing to have a blood transfusion if it is medically needed before, during, or after your surgery? Yes   13. Have you or any of your relatives ever had problems with anesthesia? No   14. Do you have sleep apnea, excessive snoring or daytime drowsiness? No   15. Do you have any artifical heart valves or other implanted medical devices like a pacemaker, defibrillator, or continuous glucose monitor? No   16. Do you have artificial joints? YES - both hips, both knees    17. Are you allergic to latex? No       Health Care Directive:  Patient does not have a Health Care Directive or Living Will: no written documents     Preoperative Review of :   reviewed - no record of controlled substances prescribed.      Status of Chronic Conditions:  See problem list for active medical  problems.  Problems all longstanding and stable, except as noted/documented.  See ROS for pertinent symptoms related to these conditions.      Review of Systems   Constitutional: Negative for chills and fever.   HENT: Negative.    Eyes: Negative for visual disturbance.   Respiratory: Negative for cough and shortness of breath.    Cardiovascular: Negative for chest pain and peripheral edema.   Gastrointestinal: Negative.  Negative for abdominal pain.   Endocrine: Negative for polydipsia and polyuria.   Genitourinary: Negative.    Musculoskeletal: Positive for back pain and gait problem (left foot drop).   Allergic/Immunologic: Negative.    Neurological: Positive for weakness (left lower extremity).   Hematological: Does not bruise/bleed easily.   Psychiatric/Behavioral: Negative.    All other systems reviewed and are negative.      No meds  No smoking since October 2022   Rare EtOH  No drugs       Patient Active Problem List    Diagnosis Date Noted     Status post total right knee replacement 11/05/2020     Priority: Medium     Primary osteoarthritis of right knee 11/02/2020     Priority: Medium     Primary osteoarthritis of left hip 10/02/2019     Priority: Medium     Primary osteoarthritis of left knee 02/06/2019     Priority: Medium     Primary osteoarthritis of right hip 09/18/2018     Priority: Medium     Family history of diabetes mellitus 06/15/2018     Priority: Medium     Personal history of tobacco use, presenting hazards to health 06/15/2018     Priority: Medium     Hip pain, right 06/15/2018     Priority: Medium     Left knee pain 06/15/2018     Priority: Medium     Chronic low back pain 06/15/2018     Priority: Medium     Alcohol use 06/15/2018     Priority: Medium      Past Medical History:   Diagnosis Date     Arthritis      Past Surgical History:   Procedure Laterality Date     ARTHROSCOPY SHOULDER ROTATOR CUFF REPAIR Right 11/01/2019     JOINT REPLACEMENT Left 02/06/2019    TKA     SOFT TISSUE  "SURGERY  2019    Rotator cuff surgery left shoulder     [facility] TOTAL HIP ARTHROPLASTY Right 2018    Procedure: RIGHT TOTAL HIP ARTHROPLASTY, POSTERIOR APPROACH;  Surgeon: Gerard Evans MD;  Location: [facility] OR;  Service: Orthopedics     [facility] TOTAL HIP ARTHROPLASTY Left 10/02/2019    Procedure: LEFT DIRECT ANTERIOR TOTAL HIP ARTHROPLASTY;  Surgeon: Gerard Evans MD;  Location: [facility] Main OR;  Service: Orthopedics     [facility] TOTAL KNEE ARTHROPLASTY Left 2019    Procedure: LEFT TOTAL KNEE ARTHROPLASTY;  Surgeon: Gerard Evans MD;  Location: [facility] Main OR;  Service: Orthopedics     [facility] TOTAL KNEE ARTHROPLASTY Right 2020    Procedure: RIGHT TOTAL KNEE ARTHROPLASTY;  Surgeon: Gerard Evans MD;  Location: [facility] OR;  Service: Orthopedics     No current outpatient medications on file.       No Known Allergies     Social History     Tobacco Use     Smoking status: Former     Packs/day: 2.00     Years: 42.00     Pack years: 84.00     Types: Cigarettes     Start date: 1980     Quit date: 10/1/2022     Years since quittin.5     Smokeless tobacco: Never   Vaping Use     Vaping status: Not on file   Substance Use Topics     Alcohol use: Not Currently     No family history on file.  History   Drug Use No         Objective     /81 (BP Location: Right arm, Patient Position: Sitting, Cuff Size: Adult Regular)   Pulse 102   Resp 20   Ht 1.848 m (6' 0.75\")   Wt 100.7 kg (222 lb)   SpO2 97%   BMI 29.49 kg/m      Physical Exam  Vitals reviewed.   Constitutional:       General: He is not in acute distress.     Appearance: Normal appearance.   HENT:      Head: Normocephalic.      Right Ear: Tympanic membrane, ear canal and external ear normal.      Left Ear: Tympanic membrane, ear canal and external ear normal.      Nose: Nose normal.      Mouth/Throat:      Mouth: Mucous membranes are moist.      Pharynx: No posterior oropharyngeal erythema.   Eyes:      Extraocular " Movements: Extraocular movements intact.      Conjunctiva/sclera: Conjunctivae normal.      Pupils: Pupils are equal, round, and reactive to light.   Cardiovascular:      Rate and Rhythm: Normal rate and regular rhythm.      Pulses: Normal pulses.      Heart sounds: Normal heart sounds. No murmur heard.  Pulmonary:      Effort: Pulmonary effort is normal.      Breath sounds: Normal breath sounds.   Abdominal:      Palpations: Abdomen is soft. There is no mass.      Tenderness: There is no abdominal tenderness. There is no guarding or rebound.   Musculoskeletal:         General: No deformity. Normal range of motion.      Cervical back: Normal range of motion and neck supple.   Lymphadenopathy:      Cervical: No cervical adenopathy.   Skin:     General: Skin is warm and dry.   Neurological:      General: No focal deficit present.      Mental Status: He is alert and oriented to person, place, and time.      Motor: Weakness (left lower extremity weakness; left foot drop) present.   Psychiatric:         Mood and Affect: Mood normal.         Behavior: Behavior normal.           Recent Labs   Lab Test 08/15/22  1210      POTASSIUM 4.8   CR 0.65*   A1C 5.7*        Diagnostics:  Recent Results (from the past 168 hour(s))   Basic metabolic panel  (Ca, Cl, CO2, Creat, Gluc, K, Na, BUN)    Collection Time: 04/05/23  2:31 PM   Result Value Ref Range    Sodium 137 136 - 145 mmol/L    Potassium 4.6 3.4 - 5.3 mmol/L    Chloride 99 98 - 107 mmol/L    Carbon Dioxide (CO2) 25 22 - 29 mmol/L    Anion Gap 13 7 - 15 mmol/L    Urea Nitrogen 13.0 8.0 - 23.0 mg/dL    Creatinine 0.58 (L) 0.67 - 1.17 mg/dL    Calcium 9.8 8.8 - 10.2 mg/dL    Glucose 93 70 - 99 mg/dL    GFR Estimate >90 >60 mL/min/1.73m2   Hemoglobin    Collection Time: 04/05/23  2:31 PM   Result Value Ref Range    Hemoglobin 14.4 13.3 - 17.7 g/dL          Revised Cardiac Risk Index (RCRI):  The patient has the following serious cardiovascular risks for perioperative  complications:   - No serious cardiac risks = 0 points     RCRI Interpretation: 1 point: Class II (low risk - 0.9% complication rate)           Signed Electronically by: MARLON TRIVEDI MD  Copy of this evaluation report is provided to requesting physician.

## 2023-04-06 NOTE — TELEPHONE ENCOUNTER
"Routing refill request to provider for review/approval because:  Drug not active on patient's medication list  Discontinued on 4/5/23 by Dr. López    Last Written Prescription Date:  9/16/22  Last Fill Quantity: 180,  # refills: 0   Last office visit provider:  4/5/23    Requested Prescriptions   Pending Prescriptions Disp Refills     varenicline (CHANTIX) 1 MG tablet [Pharmacy Med Name: Varenicline 1mg Tablet] 180 tablet 0     Sig: Take 1 tablet by mouth twice daily. **To be dispensed after the initial pack.**       Partial Cholinergic Nicotinic Agonist Agents Failed - 4/6/2023 12:08 PM        Failed - Medication is active on med list        Passed - Blood pressure under 140/90 in past 12 months     BP Readings from Last 3 Encounters:   04/05/23 114/81   08/15/22 121/83   03/15/21 130/87                 Passed - Recent (12 mo) or future (30 days) visit within the authorizing provider's specialty     Patient has had an office visit with the authorizing provider or a provider within the authorizing providers department within the previous 12 mos or has a future within next 30 days. See \"Patient Info\" tab in inbasket, or \"Choose Columns\" in Meds & Orders section of the refill encounter.              Passed - Patient is 18 years of age or older             ITZ JUÁREZ RN 04/06/23 12:08 PM  "

## 2023-04-07 ENCOUNTER — TELEPHONE (OUTPATIENT)
Dept: FAMILY MEDICINE | Facility: CLINIC | Age: 63
End: 2023-04-07
Payer: COMMERCIAL

## 2023-04-07 DIAGNOSIS — M17.12 PRIMARY OSTEOARTHRITIS OF LEFT KNEE: Primary | ICD-10-CM

## 2023-04-07 RX ORDER — ACETAMINOPHEN 500 MG
500-1000 TABLET ORAL 3 TIMES DAILY PRN
Qty: 100 TABLET | Refills: 1 | Status: SHIPPED | OUTPATIENT
Start: 2023-04-07 | End: 2023-04-21

## 2023-04-07 RX ORDER — VARENICLINE TARTRATE 1 MG/1
TABLET, FILM COATED ORAL
Qty: 180 TABLET | Refills: 0 | Status: SHIPPED | OUTPATIENT
Start: 2023-04-07 | End: 2023-04-26

## 2023-04-07 NOTE — TELEPHONE ENCOUNTER
New Medication Request        What medication are you requesting?: acetaminophen 500 mg    Reason for medication request: pt states he had a bone fusion and had to stop taking ibuprofen because its not good for that. Pt is requesting acetaminophen 500 mg. Please advise.      Have you taken this medication before?: N/A    Controlled Substance Agreement on file:   CSA -- Patient Level:    CSA: None found at the patient level.         Patient offered an appointment? No    Preferred Pharmacy:     [facility] - [place], [place] - [address] [facility] [address]  [address] [facility] [address]  [facility] [address]  Phone: [phone] Fax: [phone]      Could we send this information to you in Qwite or would you prefer to receive a phone call?:   Patient would prefer a phone call   Okay to leave a detailed message?: Yes at Home number on file [phone] (home)

## 2023-04-08 ASSESSMENT — ENCOUNTER SYMPTOMS
WEAKNESS: 1
SHORTNESS OF BREATH: 0
ALLERGIC/IMMUNOLOGIC NEGATIVE: 1
FEVER: 0
ABDOMINAL PAIN: 0
COUGH: 0
BRUISES/BLEEDS EASILY: 0
PSYCHIATRIC NEGATIVE: 1
CHILLS: 0
GASTROINTESTINAL NEGATIVE: 1
BACK PAIN: 1
POLYDIPSIA: 0

## 2023-04-20 DIAGNOSIS — M17.12 PRIMARY OSTEOARTHRITIS OF LEFT KNEE: ICD-10-CM

## 2023-04-21 RX ORDER — PSEUDOEPHED/ACETAMINOPH/DIPHEN 30MG-500MG
TABLET ORAL
Qty: 540 TABLET | Refills: 1 | Status: SHIPPED | OUTPATIENT
Start: 2023-04-21

## 2023-04-21 NOTE — TELEPHONE ENCOUNTER
"Last Written Prescription Date:  4/7/23  Last Fill Quantity: 100,  # refills: 1   Last office visit provider:  4/5/23     Requested Prescriptions   Pending Prescriptions Disp Refills     ACETAMINOPHEN EXTRA STRENGTH 500 MG tablet [Pharmacy Med Name: Acetaminophen 500mg Extra Strength Tablet] 100 tablet 0     Sig: Take 1 to 2 tablets by mouth three times daily as needed for mild pain.       Analgesics (Non-Narcotic Tylenol and ASA Only) Passed - 4/21/2023 10:51 AM        Passed - Recent (12 mo) or future (30 days) visit within the authorizing provider's specialty     Patient has had an office visit with the authorizing provider or a provider within the authorizing providers department within the previous 12 mos or has a future within next 30 days. See \"Patient Info\" tab in inbasket, or \"Choose Columns\" in Meds & Orders section of the refill encounter.              Passed - Patient is 7 months old or older     If patient is a peds patient of the age 7 mos -12 years, ok to refill using weight-based dosing.     If >3g daily and/or sig is not \"prn\", check for liver enzymes. If normal in the last year, ok to refill.  If not, refer to the provider.          Passed - Medication is active on med list             Juan F Cristobal RN 04/21/23 10:52 AM  "

## 2023-04-26 ENCOUNTER — ANESTHESIA EVENT (OUTPATIENT)
Dept: SURGERY | Facility: CLINIC | Age: 63
End: 2023-04-26
Payer: COMMERCIAL

## 2023-04-27 ENCOUNTER — TRANSFERRED RECORDS (OUTPATIENT)
Dept: HEALTH INFORMATION MANAGEMENT | Facility: CLINIC | Age: 63
End: 2023-04-27

## 2023-04-27 ENCOUNTER — ANESTHESIA (OUTPATIENT)
Dept: SURGERY | Facility: CLINIC | Age: 63
End: 2023-04-27
Payer: COMMERCIAL

## 2023-04-27 ENCOUNTER — HOSPITAL ENCOUNTER (INPATIENT)
Facility: CLINIC | Age: 63
LOS: 3 days | Discharge: HOME | End: 2023-04-30
Attending: ORTHOPAEDIC SURGERY | Admitting: ORTHOPAEDIC SURGERY
Payer: COMMERCIAL

## 2023-04-27 ENCOUNTER — ANCILLARY PROCEDURE (OUTPATIENT)
Dept: ULTRASOUND IMAGING | Facility: CLINIC | Age: 63
End: 2023-04-27
Attending: ANESTHESIOLOGY
Payer: COMMERCIAL

## 2023-04-27 ENCOUNTER — APPOINTMENT (OUTPATIENT)
Dept: RADIOLOGY | Facility: CLINIC | Age: 63
End: 2023-04-27
Attending: ORTHOPAEDIC SURGERY
Payer: COMMERCIAL

## 2023-04-27 DIAGNOSIS — Z98.890 STATUS POST LUMBAR SURGERY: Primary | ICD-10-CM

## 2023-04-27 PROCEDURE — 0SG1071 FUSION OF 2 OR MORE LUMBAR VERTEBRAL JOINTS WITH AUTOLOGOUS TISSUE SUBSTITUTE, POSTERIOR APPROACH, POSTERIOR COLUMN, OPEN APPROACH: ICD-10-PCS | Performed by: ORTHOPAEDIC SURGERY

## 2023-04-27 PROCEDURE — 258N000003 HC RX IP 258 OP 636: Performed by: PHYSICIAN ASSISTANT

## 2023-04-27 PROCEDURE — 250N000013 HC RX MED GY IP 250 OP 250 PS 637: Performed by: PHYSICIAN ASSISTANT

## 2023-04-27 PROCEDURE — 272N000001 HC OR GENERAL SUPPLY STERILE: Performed by: ORTHOPAEDIC SURGERY

## 2023-04-27 PROCEDURE — 0SG10AJ FUSION OF 2 OR MORE LUMBAR VERTEBRAL JOINTS WITH INTERBODY FUSION DEVICE, POSTERIOR APPROACH, ANTERIOR COLUMN, OPEN APPROACH: ICD-10-PCS | Performed by: ORTHOPAEDIC SURGERY

## 2023-04-27 PROCEDURE — 258N000003 HC RX IP 258 OP 636: Performed by: ANESTHESIOLOGY

## 2023-04-27 PROCEDURE — 250N000025 HC SEVOFLURANE, PER MIN: Performed by: ORTHOPAEDIC SURGERY

## 2023-04-27 PROCEDURE — 120N000001 HC R&B MED SURG/OB

## 2023-04-27 PROCEDURE — 250N000011 HC RX IP 250 OP 636: Performed by: ANESTHESIOLOGY

## 2023-04-27 PROCEDURE — 370N000017 HC ANESTHESIA TECHNICAL FEE, PER MIN: Performed by: ORTHOPAEDIC SURGERY

## 2023-04-27 PROCEDURE — 250N000009 HC RX 250: Performed by: ORTHOPAEDIC SURGERY

## 2023-04-27 PROCEDURE — 0SB20ZZ EXCISION OF LUMBAR VERTEBRAL DISC, OPEN APPROACH: ICD-10-PCS | Performed by: ORTHOPAEDIC SURGERY

## 2023-04-27 PROCEDURE — C1713 ANCHOR/SCREW BN/BN,TIS/BN: HCPCS | Performed by: ORTHOPAEDIC SURGERY

## 2023-04-27 PROCEDURE — 250N000009 HC RX 250: Performed by: ANESTHESIOLOGY

## 2023-04-27 PROCEDURE — 8E0WXBG COMPUTER ASSISTED PROCEDURE OF TRUNK REGION, WITH COMPUTERIZED TOMOGRAPHY: ICD-10-PCS | Performed by: ORTHOPAEDIC SURGERY

## 2023-04-27 PROCEDURE — 258N000003 HC RX IP 258 OP 636: Performed by: NURSE ANESTHETIST, CERTIFIED REGISTERED

## 2023-04-27 PROCEDURE — 01NB0ZZ RELEASE LUMBAR NERVE, OPEN APPROACH: ICD-10-PCS | Performed by: ORTHOPAEDIC SURGERY

## 2023-04-27 PROCEDURE — 250N000009 HC RX 250: Performed by: NURSE ANESTHETIST, CERTIFIED REGISTERED

## 2023-04-27 PROCEDURE — 258N000003 HC RX IP 258 OP 636: Performed by: ORTHOPAEDIC SURGERY

## 2023-04-27 PROCEDURE — 250N000011 HC RX IP 250 OP 636: Performed by: NURSE ANESTHETIST, CERTIFIED REGISTERED

## 2023-04-27 PROCEDURE — C1762 CONN TISS, HUMAN(INC FASCIA): HCPCS | Performed by: ORTHOPAEDIC SURGERY

## 2023-04-27 PROCEDURE — 250N000009 HC RX 250: Performed by: PHYSICIAN ASSISTANT

## 2023-04-27 PROCEDURE — 250N000011 HC RX IP 250 OP 636: Performed by: PHYSICIAN ASSISTANT

## 2023-04-27 PROCEDURE — 250N000005 HC OR RX SURGIFLO HEMOSTATIC MATRIX 10ML 199102S OPNP: Performed by: ORTHOPAEDIC SURGERY

## 2023-04-27 PROCEDURE — 999N000182 XR SURGERY CARM FLUORO GREATER THAN 5 MIN: Mod: TC

## 2023-04-27 PROCEDURE — 710N000010 HC RECOVERY PHASE 1, LEVEL 2, PER MIN: Performed by: ORTHOPAEDIC SURGERY

## 2023-04-27 PROCEDURE — 360N000085 HC SURGERY LEVEL 5 W/ FLUORO, PER MIN: Performed by: ORTHOPAEDIC SURGERY

## 2023-04-27 PROCEDURE — 250N000011 HC RX IP 250 OP 636: Performed by: ORTHOPAEDIC SURGERY

## 2023-04-27 PROCEDURE — 999N000141 HC STATISTIC PRE-PROCEDURE NURSING ASSESSMENT: Performed by: ORTHOPAEDIC SURGERY

## 2023-04-27 DEVICE — IMP ROD MEDT 8CM 1475501080: Type: IMPLANTABLE DEVICE | Site: SPINE LUMBAR | Status: FUNCTIONAL

## 2023-04-27 DEVICE — IMP SCR MEDT 4.75MM SOLERA 6.5X60MM MA 54840006560: Type: IMPLANTABLE DEVICE | Site: SPINE LUMBAR | Status: FUNCTIONAL

## 2023-04-27 DEVICE — SPACER 6068076 CATALYFT PL LONG 7MM
Type: IMPLANTABLE DEVICE | Site: SPINE LUMBAR | Status: FUNCTIONAL
Brand: CATALYFT PL EXPANDABLE INTERBODY SYSTEM

## 2023-04-27 DEVICE — GRAFT BN MAGNIFUSE DBM 5X1.75CM: Type: IMPLANTABLE DEVICE | Status: FUNCTIONAL

## 2023-04-27 DEVICE — IMPLANTABLE DEVICE: Type: IMPLANTABLE DEVICE | Site: SPINE LUMBAR | Status: FUNCTIONAL

## 2023-04-27 DEVICE — IMP ROD MEDT 7CM 1475501070: Type: IMPLANTABLE DEVICE | Site: SPINE LUMBAR | Status: FUNCTIONAL

## 2023-04-27 DEVICE — KIT BNGF 6CC DMNR CORT FBR ACCELERATE GRFTN CNN T50206: Type: IMPLANTABLE DEVICE | Status: FUNCTIONAL

## 2023-04-27 DEVICE — IMP SPI INTERBODY MEDT CATALYFT PL LONG 9MM 6068096: Type: IMPLANTABLE DEVICE | Site: SPINE LUMBAR | Status: FUNCTIONAL

## 2023-04-27 DEVICE — IMP SCR MEDT BREAK-OFF SET SOLERA 4.75MM TI 5440030: Type: IMPLANTABLE DEVICE | Site: SPINE LUMBAR | Status: FUNCTIONAL

## 2023-04-27 RX ORDER — GABAPENTIN 300 MG/1
300 CAPSULE ORAL
Status: COMPLETED | OUTPATIENT
Start: 2023-04-27 | End: 2023-04-27

## 2023-04-27 RX ORDER — AMOXICILLIN 250 MG
1 CAPSULE ORAL 2 TIMES DAILY
Status: DISCONTINUED | OUTPATIENT
Start: 2023-04-27 | End: 2023-04-30 | Stop reason: HOSPADM

## 2023-04-27 RX ORDER — HYDROMORPHONE HCL IN WATER/PF 6 MG/30 ML
0.4 PATIENT CONTROLLED ANALGESIA SYRINGE INTRAVENOUS
Status: DISCONTINUED | OUTPATIENT
Start: 2023-04-27 | End: 2023-04-30 | Stop reason: HOSPADM

## 2023-04-27 RX ORDER — PROPOFOL 10 MG/ML
INJECTION, EMULSION INTRAVENOUS PRN
Status: DISCONTINUED | OUTPATIENT
Start: 2023-04-27 | End: 2023-04-27

## 2023-04-27 RX ORDER — CEFAZOLIN SODIUM 2 G/100ML
2 INJECTION, SOLUTION INTRAVENOUS EVERY 8 HOURS
Status: COMPLETED | OUTPATIENT
Start: 2023-04-28 | End: 2023-04-28

## 2023-04-27 RX ORDER — HYDROMORPHONE HCL IN WATER/PF 6 MG/30 ML
0.4 PATIENT CONTROLLED ANALGESIA SYRINGE INTRAVENOUS EVERY 5 MIN PRN
Status: DISCONTINUED | OUTPATIENT
Start: 2023-04-27 | End: 2023-04-27 | Stop reason: HOSPADM

## 2023-04-27 RX ORDER — NALOXONE HYDROCHLORIDE 0.4 MG/ML
0.4 INJECTION, SOLUTION INTRAMUSCULAR; INTRAVENOUS; SUBCUTANEOUS
Status: DISCONTINUED | OUTPATIENT
Start: 2023-04-27 | End: 2023-04-30 | Stop reason: HOSPADM

## 2023-04-27 RX ORDER — MAGNESIUM SULFATE 4 G/50ML
4 INJECTION INTRAVENOUS ONCE
Status: COMPLETED | OUTPATIENT
Start: 2023-04-27 | End: 2023-04-27

## 2023-04-27 RX ORDER — SODIUM CHLORIDE 9 MG/ML
INJECTION, SOLUTION INTRAVENOUS CONTINUOUS PRN
Status: DISCONTINUED | OUTPATIENT
Start: 2023-04-27 | End: 2023-04-27

## 2023-04-27 RX ORDER — ONDANSETRON 4 MG/1
4 TABLET, ORALLY DISINTEGRATING ORAL EVERY 6 HOURS PRN
Status: DISCONTINUED | OUTPATIENT
Start: 2023-04-27 | End: 2023-04-30 | Stop reason: HOSPADM

## 2023-04-27 RX ORDER — CEFAZOLIN SODIUM/WATER 2 G/20 ML
2 SYRINGE (ML) INTRAVENOUS
Status: COMPLETED | OUTPATIENT
Start: 2023-04-27 | End: 2023-04-27

## 2023-04-27 RX ORDER — NALOXONE HYDROCHLORIDE 0.4 MG/ML
0.2 INJECTION, SOLUTION INTRAMUSCULAR; INTRAVENOUS; SUBCUTANEOUS
Status: DISCONTINUED | OUTPATIENT
Start: 2023-04-27 | End: 2023-04-30 | Stop reason: HOSPADM

## 2023-04-27 RX ORDER — LORATADINE 10 MG/1
10 TABLET ORAL DAILY PRN
Status: DISCONTINUED | OUTPATIENT
Start: 2023-04-27 | End: 2023-04-30 | Stop reason: HOSPADM

## 2023-04-27 RX ORDER — BUPIVACAINE HYDROCHLORIDE AND EPINEPHRINE 2.5; 5 MG/ML; UG/ML
INJECTION, SOLUTION EPIDURAL; INFILTRATION; INTRACAUDAL; PERINEURAL PRN
Status: DISCONTINUED | OUTPATIENT
Start: 2023-04-27 | End: 2023-04-27 | Stop reason: HOSPADM

## 2023-04-27 RX ORDER — FENTANYL CITRATE 50 UG/ML
50 INJECTION, SOLUTION INTRAMUSCULAR; INTRAVENOUS EVERY 5 MIN PRN
Status: DISCONTINUED | OUTPATIENT
Start: 2023-04-27 | End: 2023-04-27 | Stop reason: HOSPADM

## 2023-04-27 RX ORDER — SODIUM CHLORIDE, SODIUM LACTATE, POTASSIUM CHLORIDE, CALCIUM CHLORIDE 600; 310; 30; 20 MG/100ML; MG/100ML; MG/100ML; MG/100ML
INJECTION, SOLUTION INTRAVENOUS CONTINUOUS
Status: DISCONTINUED | OUTPATIENT
Start: 2023-04-27 | End: 2023-04-27 | Stop reason: HOSPADM

## 2023-04-27 RX ORDER — LIDOCAINE 40 MG/G
CREAM TOPICAL
Status: DISCONTINUED | OUTPATIENT
Start: 2023-04-27 | End: 2023-04-27 | Stop reason: HOSPADM

## 2023-04-27 RX ORDER — ONDANSETRON 2 MG/ML
INJECTION INTRAMUSCULAR; INTRAVENOUS PRN
Status: DISCONTINUED | OUTPATIENT
Start: 2023-04-27 | End: 2023-04-27

## 2023-04-27 RX ORDER — HYDROXYZINE HYDROCHLORIDE 25 MG/1
25 TABLET, FILM COATED ORAL EVERY 6 HOURS PRN
Status: DISCONTINUED | OUTPATIENT
Start: 2023-04-27 | End: 2023-04-30 | Stop reason: HOSPADM

## 2023-04-27 RX ORDER — SODIUM CHLORIDE 9 MG/ML
INJECTION, SOLUTION INTRAVENOUS CONTINUOUS
Status: DISCONTINUED | OUTPATIENT
Start: 2023-04-27 | End: 2023-04-30 | Stop reason: HOSPADM

## 2023-04-27 RX ORDER — POLYETHYLENE GLYCOL 3350 17 G/17G
17 POWDER, FOR SOLUTION ORAL DAILY
Status: DISCONTINUED | OUTPATIENT
Start: 2023-04-28 | End: 2023-04-30 | Stop reason: HOSPADM

## 2023-04-27 RX ORDER — MAGNESIUM HYDROXIDE 1200 MG/15ML
LIQUID ORAL PRN
Status: DISCONTINUED | OUTPATIENT
Start: 2023-04-27 | End: 2023-04-27 | Stop reason: HOSPADM

## 2023-04-27 RX ORDER — ONDANSETRON 4 MG/1
4 TABLET, ORALLY DISINTEGRATING ORAL EVERY 30 MIN PRN
Status: DISCONTINUED | OUTPATIENT
Start: 2023-04-27 | End: 2023-04-27 | Stop reason: HOSPADM

## 2023-04-27 RX ORDER — VANCOMYCIN HYDROCHLORIDE 1 G/20ML
INJECTION, POWDER, LYOPHILIZED, FOR SOLUTION INTRAVENOUS PRN
Status: DISCONTINUED | OUTPATIENT
Start: 2023-04-27 | End: 2023-04-27 | Stop reason: HOSPADM

## 2023-04-27 RX ORDER — MULTIVITAMIN,THERAPEUTIC
1 TABLET ORAL DAILY
Status: DISCONTINUED | OUTPATIENT
Start: 2023-04-28 | End: 2023-04-30 | Stop reason: HOSPADM

## 2023-04-27 RX ORDER — ONDANSETRON 2 MG/ML
4 INJECTION INTRAMUSCULAR; INTRAVENOUS EVERY 30 MIN PRN
Status: DISCONTINUED | OUTPATIENT
Start: 2023-04-27 | End: 2023-04-27 | Stop reason: HOSPADM

## 2023-04-27 RX ORDER — FENTANYL CITRATE 50 UG/ML
50 INJECTION, SOLUTION INTRAMUSCULAR; INTRAVENOUS
Status: COMPLETED | OUTPATIENT
Start: 2023-04-27 | End: 2023-04-27

## 2023-04-27 RX ORDER — DEXAMETHASONE SODIUM PHOSPHATE 4 MG/ML
INJECTION, SOLUTION INTRA-ARTICULAR; INTRALESIONAL; INTRAMUSCULAR; INTRAVENOUS; SOFT TISSUE PRN
Status: DISCONTINUED | OUTPATIENT
Start: 2023-04-27 | End: 2023-04-27

## 2023-04-27 RX ORDER — HYDROMORPHONE HCL IN WATER/PF 6 MG/30 ML
0.2 PATIENT CONTROLLED ANALGESIA SYRINGE INTRAVENOUS
Status: DISCONTINUED | OUTPATIENT
Start: 2023-04-27 | End: 2023-04-30 | Stop reason: HOSPADM

## 2023-04-27 RX ORDER — HYDROMORPHONE HCL IN WATER/PF 6 MG/30 ML
0.2 PATIENT CONTROLLED ANALGESIA SYRINGE INTRAVENOUS EVERY 5 MIN PRN
Status: DISCONTINUED | OUTPATIENT
Start: 2023-04-27 | End: 2023-04-27 | Stop reason: HOSPADM

## 2023-04-27 RX ORDER — ACETAMINOPHEN 325 MG/1
975 TABLET ORAL EVERY 8 HOURS
Status: COMPLETED | OUTPATIENT
Start: 2023-04-27 | End: 2023-04-30

## 2023-04-27 RX ORDER — ACETAMINOPHEN 325 MG/1
975 TABLET ORAL ONCE
Status: COMPLETED | OUTPATIENT
Start: 2023-04-27 | End: 2023-04-27

## 2023-04-27 RX ORDER — OXYCODONE HYDROCHLORIDE 5 MG/1
5 TABLET ORAL EVERY 4 HOURS PRN
Status: DISCONTINUED | OUTPATIENT
Start: 2023-04-27 | End: 2023-04-30 | Stop reason: HOSPADM

## 2023-04-27 RX ORDER — GABAPENTIN 100 MG/1
100 CAPSULE ORAL 3 TIMES DAILY
Status: DISCONTINUED | OUTPATIENT
Start: 2023-04-27 | End: 2023-04-30 | Stop reason: HOSPADM

## 2023-04-27 RX ORDER — ONDANSETRON 2 MG/ML
4 INJECTION INTRAMUSCULAR; INTRAVENOUS EVERY 6 HOURS PRN
Status: DISCONTINUED | OUTPATIENT
Start: 2023-04-27 | End: 2023-04-30 | Stop reason: HOSPADM

## 2023-04-27 RX ORDER — OXYCODONE HYDROCHLORIDE 5 MG/1
10 TABLET ORAL EVERY 4 HOURS PRN
Status: DISCONTINUED | OUTPATIENT
Start: 2023-04-27 | End: 2023-04-30 | Stop reason: HOSPADM

## 2023-04-27 RX ORDER — CEFAZOLIN SODIUM/WATER 2 G/20 ML
2 SYRINGE (ML) INTRAVENOUS SEE ADMIN INSTRUCTIONS
Status: DISCONTINUED | OUTPATIENT
Start: 2023-04-27 | End: 2023-04-27 | Stop reason: HOSPADM

## 2023-04-27 RX ORDER — FENTANYL CITRATE 50 UG/ML
25 INJECTION, SOLUTION INTRAMUSCULAR; INTRAVENOUS EVERY 5 MIN PRN
Status: DISCONTINUED | OUTPATIENT
Start: 2023-04-27 | End: 2023-04-27 | Stop reason: HOSPADM

## 2023-04-27 RX ORDER — PROPOFOL 10 MG/ML
INJECTION, EMULSION INTRAVENOUS CONTINUOUS PRN
Status: DISCONTINUED | OUTPATIENT
Start: 2023-04-27 | End: 2023-04-27

## 2023-04-27 RX ORDER — BISACODYL 10 MG
10 SUPPOSITORY, RECTAL RECTAL DAILY PRN
Status: DISCONTINUED | OUTPATIENT
Start: 2023-04-27 | End: 2023-04-30 | Stop reason: HOSPADM

## 2023-04-27 RX ORDER — PROCHLORPERAZINE MALEATE 10 MG
10 TABLET ORAL EVERY 6 HOURS PRN
Status: DISCONTINUED | OUTPATIENT
Start: 2023-04-27 | End: 2023-04-30 | Stop reason: HOSPADM

## 2023-04-27 RX ADMIN — SODIUM CHLORIDE, POTASSIUM CHLORIDE, SODIUM LACTATE AND CALCIUM CHLORIDE 1000 ML: 600; 310; 30; 20 INJECTION, SOLUTION INTRAVENOUS at 09:18

## 2023-04-27 RX ADMIN — PROPOFOL 75 MCG/KG/MIN: 10 INJECTION, EMULSION INTRAVENOUS at 14:14

## 2023-04-27 RX ADMIN — ROCURONIUM BROMIDE 50 MG: 10 INJECTION, SOLUTION INTRAVENOUS at 13:49

## 2023-04-27 RX ADMIN — DEXAMETHASONE SODIUM PHOSPHATE 4 MG: 4 INJECTION, SOLUTION INTRA-ARTICULAR; INTRALESIONAL; INTRAMUSCULAR; INTRAVENOUS; SOFT TISSUE at 13:49

## 2023-04-27 RX ADMIN — HYDROMORPHONE HYDROCHLORIDE 0.5 MG: 1 INJECTION, SOLUTION INTRAMUSCULAR; INTRAVENOUS; SUBCUTANEOUS at 15:52

## 2023-04-27 RX ADMIN — OXYCODONE HYDROCHLORIDE 5 MG: 5 TABLET ORAL at 20:06

## 2023-04-27 RX ADMIN — GABAPENTIN 100 MG: 100 CAPSULE ORAL at 20:58

## 2023-04-27 RX ADMIN — ROCURONIUM BROMIDE 10 MG: 10 INJECTION, SOLUTION INTRAVENOUS at 15:14

## 2023-04-27 RX ADMIN — ROCURONIUM BROMIDE 20 MG: 10 INJECTION, SOLUTION INTRAVENOUS at 14:55

## 2023-04-27 RX ADMIN — MAGNESIUM SULFATE HEPTAHYDRATE 4 G: 4 INJECTION, SOLUTION INTRAVENOUS at 09:26

## 2023-04-27 RX ADMIN — PROPOFOL 200 MG: 10 INJECTION, EMULSION INTRAVENOUS at 13:49

## 2023-04-27 RX ADMIN — FENTANYL CITRATE 50 MCG: 50 INJECTION, SOLUTION INTRAMUSCULAR; INTRAVENOUS at 14:30

## 2023-04-27 RX ADMIN — FENTANYL CITRATE 50 MCG: 50 INJECTION, SOLUTION INTRAMUSCULAR; INTRAVENOUS at 17:38

## 2023-04-27 RX ADMIN — Medication 2 G: at 13:33

## 2023-04-27 RX ADMIN — TRANEXAMIC ACID 1 G: 1 INJECTION, SOLUTION INTRAVENOUS at 13:46

## 2023-04-27 RX ADMIN — FENTANYL CITRATE 50 MCG: 50 INJECTION, SOLUTION INTRAMUSCULAR; INTRAVENOUS at 17:52

## 2023-04-27 RX ADMIN — PHENYLEPHRINE HYDROCHLORIDE 100 MCG: 10 INJECTION INTRAVENOUS at 14:25

## 2023-04-27 RX ADMIN — SODIUM CHLORIDE, POTASSIUM CHLORIDE, SODIUM LACTATE AND CALCIUM CHLORIDE: 600; 310; 30; 20 INJECTION, SOLUTION INTRAVENOUS at 17:31

## 2023-04-27 RX ADMIN — SODIUM CHLORIDE: 9 INJECTION, SOLUTION INTRAVENOUS at 18:23

## 2023-04-27 RX ADMIN — PHENYLEPHRINE HYDROCHLORIDE 0.3 MCG/KG/MIN: 10 INJECTION INTRAVENOUS at 14:26

## 2023-04-27 RX ADMIN — LIDOCAINE HYDROCHLORIDE 20 MG: 10 INJECTION, SOLUTION EPIDURAL; INFILTRATION; INTRACAUDAL; PERINEURAL at 13:49

## 2023-04-27 RX ADMIN — ACETAMINOPHEN 975 MG: 325 TABLET ORAL at 09:21

## 2023-04-27 RX ADMIN — PHENYLEPHRINE HYDROCHLORIDE 100 MCG: 10 INJECTION INTRAVENOUS at 14:15

## 2023-04-27 RX ADMIN — MIDAZOLAM 2 MG: 1 INJECTION INTRAMUSCULAR; INTRAVENOUS at 13:36

## 2023-04-27 RX ADMIN — SUGAMMADEX 200 MG: 100 INJECTION, SOLUTION INTRAVENOUS at 18:50

## 2023-04-27 RX ADMIN — Medication 2 G: at 17:35

## 2023-04-27 RX ADMIN — PHENYLEPHRINE HYDROCHLORIDE 100 MCG: 10 INJECTION INTRAVENOUS at 14:02

## 2023-04-27 RX ADMIN — HYDROMORPHONE HYDROCHLORIDE 0.5 MG: 1 INJECTION, SOLUTION INTRAMUSCULAR; INTRAVENOUS; SUBCUTANEOUS at 15:34

## 2023-04-27 RX ADMIN — GABAPENTIN 300 MG: 300 CAPSULE ORAL at 09:21

## 2023-04-27 RX ADMIN — PHENYLEPHRINE HYDROCHLORIDE 100 MCG: 10 INJECTION INTRAVENOUS at 14:20

## 2023-04-27 RX ADMIN — ROCURONIUM BROMIDE 30 MG: 10 INJECTION, SOLUTION INTRAVENOUS at 14:31

## 2023-04-27 RX ADMIN — FENTANYL CITRATE 25 MCG: 50 INJECTION INTRAMUSCULAR; INTRAVENOUS at 19:41

## 2023-04-27 RX ADMIN — FENTANYL CITRATE 50 MCG: 50 INJECTION, SOLUTION INTRAMUSCULAR; INTRAVENOUS at 13:49

## 2023-04-27 RX ADMIN — ACETAMINOPHEN 975 MG: 325 TABLET ORAL at 20:58

## 2023-04-27 RX ADMIN — ONDANSETRON 4 MG: 2 INJECTION INTRAMUSCULAR; INTRAVENOUS at 13:49

## 2023-04-27 RX ADMIN — SODIUM CHLORIDE: 9 INJECTION, SOLUTION INTRAVENOUS at 20:58

## 2023-04-27 ASSESSMENT — ACTIVITIES OF DAILY LIVING (ADL)
ADLS_ACUITY_SCORE: 35
ADLS_ACUITY_SCORE: 35
ADLS_ACUITY_SCORE: 31
ADLS_ACUITY_SCORE: 35
ADLS_ACUITY_SCORE: 35
ADLS_ACUITY_SCORE: 31
ADLS_ACUITY_SCORE: 33
ADLS_ACUITY_SCORE: 35

## 2023-04-27 NOTE — ANESTHESIA PREPROCEDURE EVALUATION
Anesthesia Pre-Procedure Evaluation    Patient: Wm Gaines   MRN: [id number] : 1960        Procedure : Procedure(s):  LEFT LUMBAR 3-4 AND LEFT LUMBAR 4-5 TRANSFORAMINAL LUMBAR INTERBODY FUSION WITH LAMINECTOMY USING STEALTH NAVIGATION          Past Medical History:   Diagnosis Date     Arthritis       Past Surgical History:   Procedure Laterality Date     ARTHROSCOPY SHOULDER ROTATOR CUFF REPAIR Right 2019     JOINT REPLACEMENT Left 2019    TKA     SOFT TISSUE SURGERY  2019    Rotator cuff surgery left shoulder     [facility] TOTAL HIP ARTHROPLASTY Right 2018    Procedure: RIGHT TOTAL HIP ARTHROPLASTY, POSTERIOR APPROACH;  Surgeon: Gerard Evans MD;  Location: [facility] Main OR;  Service: Orthopedics     [facility] TOTAL HIP ARTHROPLASTY Left 10/02/2019    Procedure: LEFT DIRECT ANTERIOR TOTAL HIP ARTHROPLASTY;  Surgeon: Gerard Evans MD;  Location[facility] Main OR;  Service: Orthopedics     [facility] TOTAL KNEE ARTHROPLASTY Left 2019    Procedure: LEFT TOTAL KNEE ARTHROPLASTY;  Surgeon: Gerard Evans MD;  Location: [facility] Main OR;  Service: Orthopedics     [facility] TOTAL KNEE ARTHROPLASTY Right 2020    Procedure: RIGHT TOTAL KNEE ARTHROPLASTY;  Surgeon: Gerard Evans MD;  Location: [facility] Main OR;  Service: Orthopedics      No Known Allergies   Social History     Tobacco Use     Smoking status: Former     Packs/day: 2.00     Years: 42.00     Pack years: 84.00     Types: Cigarettes     Start date: 1980     Quit date: 10/1/2022     Years since quittin.5     Smokeless tobacco: Never   Vaping Use     Vaping status: Never Used   Substance Use Topics     Alcohol use: Not Currently      Wt Readings from Last 1 Encounters:   23 100.7 kg (221 lb 15.7 oz)        Anesthesia Evaluation            ROS/MED HX  ENT/Pulmonary:  - neg pulmonary ROS     Neurologic:  - neg neurologic ROS     Cardiovascular:  - neg cardiovascular ROS     METS/Exercise Tolerance: >4 METS     Hematologic:  - neg hematologic  ROS     Musculoskeletal:  - neg musculoskeletal ROS (+) arthritis,     GI/Hepatic:  - neg GI/hepatic ROS     Renal/Genitourinary:  - neg Renal ROS     Endo:  - neg endo ROS     Psychiatric/Substance Use:  - neg psychiatric ROS     Infectious Disease:  - neg infectious disease ROS     Malignancy:  - neg malignancy ROS     Other:  - neg other ROS    (+) , H/O Chronic Pain (LBP),        Physical Exam    Airway  airway exam normal      Mallampati: II   TM distance: > 3 FB   Neck ROM: full   Mouth opening: > 3 cm    Respiratory Devices and Support         Dental           Cardiovascular   cardiovascular exam normal       Rhythm and rate: regular     Pulmonary   pulmonary exam normal        breath sounds clear to auscultation           OUTSIDE LABS:  CBC:   Lab Results   Component Value Date    WBC 6.0 11/02/2020    WBC 7.4 10/05/2020    HGB 14.4 04/05/2023    HGB 11.9 (L) 11/05/2020    HCT 45.7 11/02/2020    HCT 45.5 10/05/2020     11/02/2020     10/05/2020     BMP:   Lab Results   Component Value Date     04/05/2023     08/15/2022    POTASSIUM 4.6 04/05/2023    POTASSIUM 4.8 08/15/2022    CHLORIDE 99 04/05/2023    CHLORIDE 99 08/15/2022    CO2 25 04/05/2023    CO2 29 08/15/2022    BUN 13.0 04/05/2023    BUN 9.3 08/15/2022    CR 0.58 (L) 04/05/2023    CR 0.65 (L) 08/15/2022    GLC 93 04/05/2023    GLC 94 08/15/2022     COAGS:   Lab Results   Component Value Date    INR 0.94 09/10/2019     POC: No results found for: BGM, HCG, HCGS  HEPATIC:   Lab Results   Component Value Date    ALBUMIN 4.6 08/15/2022    PROTTOTAL 7.7 08/15/2022    ALT 38 08/15/2022    AST 41 08/15/2022    ALKPHOS 114 08/15/2022    BILITOTAL 0.4 08/15/2022     OTHER:   Lab Results   Component Value Date    A1C 5.7 (H) 08/15/2022    STEPHANIE 9.8 04/05/2023    TSH 2.23 08/15/2022    CRP 0.7 06/26/2018    SED 10 06/26/2018       Anesthesia Plan    ASA Status:  2   NPO Status:  NPO Appropriate     Anesthesia Type: General.     - Airway: ETT   Induction: Intravenous.   Maintenance: Balanced.        Consents    Anesthesia Plan(s) and associated risks, benefits, and realistic alternatives discussed. Questions answered and patient/representative(s) expressed understanding.     - Discussed: Risks, Benefits and Alternatives for BOTH SEDATION and the PROCEDURE were discussed     - Discussed with:  Patient      - Extended Intubation/Ventilatory Support Discussed: No.         Postoperative Care    Pain management: IV analgesics, Oral pain medications.   PONV prophylaxis: Ondansetron (or other 5HT-3), Dexamethasone or Solumedrol     Comments:                Jose F Saini MD

## 2023-04-27 NOTE — ANESTHESIA PROCEDURE NOTES
Airway       Patient location during procedure: OR       Procedure Start/Stop Times: 4/27/2023 1:52 PM and 4/27/2023 1:54 PM  Staff -        CRNA: Jorge Youngblood APRN CRNA       Performed By: CRNA  Consent for Airway        Urgency: elective  Indications and Patient Condition       Indications for airway management: delicia-procedural and airway protection       Induction type:intravenous       Mask difficulty assessment: 1 - vent by mask    Final Airway Details       Final airway type: endotracheal airway       Successful airway: ETT - single  Endotracheal Airway Details        ETT size (mm): 7.5       Cuffed: yes       Cuff volume (mL): 6       Successful intubation technique: direct laryngoscopy       DL Blade Type: Lozano 2       Grade View of Cords: 1       Position: Right       Measured from: lips       Secured at (cm): 24       Bite block used: None    Post intubation assessment        Placement verified by: capnometry, equal breath sounds and chest rise        Number of attempts at approach: 1       Number of other approaches attempted: 0       Secured with: cloth tape       Ease of procedure: easy       Dentition: Unchanged    Medication(s) Administered   Medication Administration Time: 4/27/2023 1:52 PM

## 2023-04-27 NOTE — OP NOTE
DATE OF SERVICE: 04/27/2023     PREOPERATIVE DIAGNOSES:   1.  Severe spinal stenosis at L3-4 and L4-5  2.  L4-5 spondylolisthesis   3.  L3-4 and L4-5 degenerative disc disease  4.  Failure of conservative measures      POSTOPERATIVE DIAGNOSES:   1.  Severe spinal stenosis at L3-4 and L4-5  2.  L4-5 spondylolisthesis   3.  L3-4 and L4-5 degenerative disc disease  4.  Failure of conservative measures      PROCEDURE:   1. Left-sided transforaminal/transfacet decompression of the exiting L4 and traversing L5 nerve roots.   2. Transforaminal lumbar interbody fusion L4-5 with autograft bone, Medtronic Catalyft interbody long 7  3. Left-sided transforaminal/transfacet decompression of the exiting L3 and traversing L4 nerve roots.   4. Transforaminal lumbar interbody fusion L3-4 with autograft bone, Medtronic Catalyft interbody long 9  5. Posterior lateral fusion on the right at L3-4 and L4-5.   6. Segmental pedicle screw fixation in the pedicles of L3, L4, and L5 bilaterally  7. Complete laminectomy at L3-4 and L4-5  8. O arm with intra operative navigation     Medtronic Solera navigated pedicle screws systems.      SURGEON: Dr. Philip Rico.      ASSISTANT: Tito Carty PA-C, who was needed for patient positioning, soft tissue   retraction, patient safety and assistance with closure of the wound.  He was vital in the protection of the nerve roots as well as the dura.  This could only be performed by a surgical PA trained in spine     ESTIMATED BLOOD LOSS: 410 mL, 150 mL returned via Cell Saver     DRAINS: Hemovac      COMPLICATIONS: None perceived.      SPECIMENS SENT: None.      HISTORY OF PRESENT ILLNESS AND INDICATIONS FOR PROCEDURE:   This is a pleasant 62 year old male that presented with severe buttock and leg pain.  MRI demonstrated L3-4 and L4-5 severe central stenosis with L4-5 spondylolisthesis and L3-4 and L4-5 degenerative disc disease.  He underwent non-operative treatment with injections as well as  therapy with no improvement long term.  We discussed because of this that he was a candidate for surgery.  I discussed that a fusion would give him more reliable results given spondylolisthesis.  He wished to proceed with the fusion.  We discussed the risks and benefits which include but are not limited to bleeding, infection, damage to the nerve or dura, failure of procedure to provide pain relief, foot drop, iatrogenic instability, pseudoarthrosis, need for additional procedures, and risks associated with anesthesia.  The intention of the surgery is to treat leg pain and will not reliably treat any back pain. He was completely clear on this.  Following this, they would like to proceed with the fusion.       DESCRIPTION OF PROCEDURE     Therefore, the patient was brought to the operating room. He was intubated via general endotracheal anesthetic and placed on a Joseph table with a Manyn frame, care taken to pad all of her bony prominences. Pause for the Cause was performed correctly identifying the patient, procedure, proposed plan and radiographs and all were in agreement. We then dissected down over the L3 and L4 hemilamina bilaterally. We dissected over the facet joints at L3-4 and L4-5 bilaterally.  In addition to this, the transverse process was visualized at the L3, L4 and L5 level.  O arm was brought in and a tracer was placed on the L5 spinous process.  An intraoperative CT scan was taken.       We then turned our attention to placing pedicle screws, first using a Midas Natalio bur, then a pedicle probe, then a Daley probe to palpate the cannulated pedicle. We then tapped the holes to 5.5 mm.  We then used the Daley probe again to palpate all 4 quadrants of our cannulated pedicle. We then placed 5.5x60 screws bilaterally at L3 and 6.5x60 screws at L4 and 6.5x60 screws in L5.   A subsequent O arm spin was performed and demonstrated the screws in good position.     We then turned our attention to the  decompression and interbody.  A laminotomy was made.  The left L3 pars was scored allowing for the removal of the descending articular process of L3 along with the ascending articular process of L4. This gave us our transforaminal/transfacet decompression of the exiting L3, traversing L4 nerve root.      At the disk space level, we performed a box annulotomy at L3-4, used a combination of ODC curettes, pituitary rongeurs and Kerrison rongeurs to remove the disk material. We took great care not to dig into the endplates. When all of the disk material that we could remove was taken out, we gently scored the endplates and placed autograft bone. We then placed the interbody graft, a 27 mm length and 9 height, Medtronic catalyft cage. This had good fit and fill.       We then turned our attention to the decompression and interbody at L4-5.  A laminotomy was made.  The left L4 pars was scored allowing for the removal of the descending articular process of L4 along with the ascending articular process of L5. This gave us our transforaminal/transfacet decompression of the exiting L4, traversing L5 nerve root.      At the disk space level, we performed a box annulotomy at L4-5, used a combination of ODC curettes, pituitary rongeurs and Kerrison rongeurs to remove the disk material. We took great care not to dig into the endplates. When all of the disk material that we could remove was taken out, we gently scored the endplates and placed autograft bone. We then placed the interbody graft,  a 27 mm length and 7 height Medtronic Catalyft cage. This had good fit and fill.     We then performed a complete laminectomy and medial facetectomy.  Thick ligamentum and epidural fat was encountered and removed in a piecemeal fashion to completely free the right lateral recess at L3-4.  This same procedure was carried out at L4-5 with severe stenosis noted.         We then decorticated our contralateral facet joint at L3-4 and L4-5 as  well as the transverse processes and placed additional autograft in the defect.  This was mixed with a medium Stew biologic, Magnifuse and additional autograft to assist in posterolateral fusion.       We then placed our connectors and rods and tightened to the 's specifications. We irrigated the wound copiously, had good hemostasis. Vancomycin was placed over the hardware.  The fascia was closed with #1 Vicryl, subcutaneous tissue with 2-0 Vicryl, skin with a 3-0 monocryl.  A sterile dressing was applied. The patient tolerated the procedure well. There were no apparent complications.      He will be weightbearing as tolerated bilateral lower extremities with strict instructions to avoid bending, lifting and twisting over the next 6 weeks. They may have a corset type brace for comfort and have early mobilization and MONICA stockings for her DVT prophylaxis. He will have 2 grams of Ancef IV q.8 hours x2 doses for antibiotics or until his drain is removed. Return to see me in 2 weeks, sooner if there are any problems, questions or concerns.     Hardware used:  Medtronic Solera screws and rods with end caps  Medtronic Catalyft cage  [place]  Magnifuse

## 2023-04-27 NOTE — INTERVAL H&P NOTE
"I have reviewed the surgical (or preoperative) H&P that is linked to this encounter, and examined the patient. There are no significant changes    Clinical Conditions Present on Arrival:  Clinically Significant Risk Factors Present on Admission                  # Overweight: Estimated body mass index is 29.74 kg/m  as calculated from the following:    Height as of this encounter: 1.84 m (6' 0.44\").    Weight as of this encounter: 100.7 kg (221 lb 15.7 oz).       "

## 2023-04-27 NOTE — PHARMACY-ADMISSION MEDICATION HISTORY
Pharmacist Admission Medication History    Admission medication history is complete. The information provided in this note is only as accurate as the sources available at the time of the update.    Medication reconciliation/reorder completed by provider prior to medication history? No    Information Source(s): Patient and CareEverywhere/SureScripts via in-person    Pertinent Information: Pt stopped taking Advil, aspirin last month.    Changes made to PTA medication list:    Added: None    Deleted: None    Changed: None    Allergies reviewed with patient and updates made in EHR: yes    Medication History Completed By: Lakesha Miller PharmD 4/27/2023 9:17 AM    Prior to Admission medications    Medication Sig Last Dose Taking? Auth Provider Long Term End Date   ACETAMINOPHEN EXTRA STRENGTH 500 MG tablet Take 1 to 2 tablets by mouth three times daily as needed for mild pain.  Yes Roz López MD

## 2023-04-28 ENCOUNTER — APPOINTMENT (OUTPATIENT)
Dept: PHYSICAL THERAPY | Facility: CLINIC | Age: 63
End: 2023-04-28
Attending: ORTHOPAEDIC SURGERY
Payer: COMMERCIAL

## 2023-04-28 ENCOUNTER — APPOINTMENT (OUTPATIENT)
Dept: OCCUPATIONAL THERAPY | Facility: CLINIC | Age: 63
End: 2023-04-28
Attending: ORTHOPAEDIC SURGERY
Payer: COMMERCIAL

## 2023-04-28 PROBLEM — G89.29 CHRONIC LOW BACK PAIN: Status: ACTIVE | Noted: 2018-06-15

## 2023-04-28 PROBLEM — Z83.3 FAMILY HISTORY OF DIABETES MELLITUS: Status: ACTIVE | Noted: 2018-06-15

## 2023-04-28 PROBLEM — M54.50 CHRONIC LOW BACK PAIN: Status: ACTIVE | Noted: 2018-06-15

## 2023-04-28 LAB — HGB BLD-MCNC: 12.1 G/DL (ref 13.3–17.7)

## 2023-04-28 PROCEDURE — 250N000013 HC RX MED GY IP 250 OP 250 PS 637: Performed by: INTERNAL MEDICINE

## 2023-04-28 PROCEDURE — 99223 1ST HOSP IP/OBS HIGH 75: CPT | Performed by: HOSPITALIST

## 2023-04-28 PROCEDURE — 36415 COLL VENOUS BLD VENIPUNCTURE: CPT | Performed by: PHYSICIAN ASSISTANT

## 2023-04-28 PROCEDURE — 97161 PT EVAL LOW COMPLEX 20 MIN: CPT | Mod: GP

## 2023-04-28 PROCEDURE — 85018 HEMOGLOBIN: CPT | Performed by: PHYSICIAN ASSISTANT

## 2023-04-28 PROCEDURE — 120N000001 HC R&B MED SURG/OB

## 2023-04-28 PROCEDURE — 250N000011 HC RX IP 250 OP 636: Performed by: PHYSICIAN ASSISTANT

## 2023-04-28 PROCEDURE — 250N000011 HC RX IP 250 OP 636

## 2023-04-28 PROCEDURE — 97116 GAIT TRAINING THERAPY: CPT | Mod: GP

## 2023-04-28 PROCEDURE — 97166 OT EVAL MOD COMPLEX 45 MIN: CPT | Mod: GO

## 2023-04-28 PROCEDURE — 250N000013 HC RX MED GY IP 250 OP 250 PS 637: Performed by: PHYSICIAN ASSISTANT

## 2023-04-28 PROCEDURE — 97535 SELF CARE MNGMENT TRAINING: CPT | Mod: GO

## 2023-04-28 PROCEDURE — 97110 THERAPEUTIC EXERCISES: CPT | Mod: GP

## 2023-04-28 RX ORDER — GABAPENTIN 100 MG/1
100 CAPSULE ORAL 3 TIMES DAILY
Qty: 30 CAPSULE | Refills: 0 | Status: SHIPPED | OUTPATIENT
Start: 2023-04-28 | End: 2023-05-18

## 2023-04-28 RX ORDER — CEFAZOLIN SODIUM 2 G/100ML
2 INJECTION, SOLUTION INTRAVENOUS EVERY 8 HOURS
Status: DISCONTINUED | OUTPATIENT
Start: 2023-04-28 | End: 2023-04-30 | Stop reason: HOSPADM

## 2023-04-28 RX ORDER — HYDROXYZINE HYDROCHLORIDE 25 MG/1
25 TABLET, FILM COATED ORAL EVERY 6 HOURS PRN
Qty: 30 TABLET | Refills: 0 | Status: SHIPPED | OUTPATIENT
Start: 2023-04-28 | End: 2024-04-02

## 2023-04-28 RX ORDER — OXYCODONE HYDROCHLORIDE 5 MG/1
5 TABLET ORAL EVERY 4 HOURS PRN
Qty: 30 TABLET | Refills: 0 | Status: SHIPPED | OUTPATIENT
Start: 2023-04-28 | End: 2023-10-27

## 2023-04-28 RX ORDER — CALCIUM CARBONATE 500 MG/1
500 TABLET, CHEWABLE ORAL DAILY PRN
Status: DISCONTINUED | OUTPATIENT
Start: 2023-04-28 | End: 2023-04-30 | Stop reason: HOSPADM

## 2023-04-28 RX ORDER — AMOXICILLIN 250 MG
1 CAPSULE ORAL 2 TIMES DAILY
Qty: 60 TABLET | Refills: 0 | Status: SHIPPED | OUTPATIENT
Start: 2023-04-28 | End: 2023-10-27

## 2023-04-28 RX ADMIN — GABAPENTIN 100 MG: 100 CAPSULE ORAL at 13:44

## 2023-04-28 RX ADMIN — THERA TABS 1 TABLET: TAB at 09:26

## 2023-04-28 RX ADMIN — ACETAMINOPHEN 975 MG: 325 TABLET ORAL at 04:48

## 2023-04-28 RX ADMIN — GABAPENTIN 100 MG: 100 CAPSULE ORAL at 20:36

## 2023-04-28 RX ADMIN — OXYCODONE HYDROCHLORIDE 5 MG: 5 TABLET ORAL at 09:24

## 2023-04-28 RX ADMIN — CEFAZOLIN SODIUM 2 G: 2 INJECTION, SOLUTION INTRAVENOUS at 17:01

## 2023-04-28 RX ADMIN — ACETAMINOPHEN 975 MG: 325 TABLET ORAL at 13:44

## 2023-04-28 RX ADMIN — GABAPENTIN 100 MG: 100 CAPSULE ORAL at 09:23

## 2023-04-28 RX ADMIN — OXYCODONE HYDROCHLORIDE 5 MG: 5 TABLET ORAL at 14:45

## 2023-04-28 RX ADMIN — CALCIUM CARBONATE (ANTACID) CHEW TAB 500 MG 500 MG: 500 CHEW TAB at 19:44

## 2023-04-28 RX ADMIN — CEFAZOLIN SODIUM 2 G: 2 INJECTION, SOLUTION INTRAVENOUS at 09:25

## 2023-04-28 RX ADMIN — POLYETHYLENE GLYCOL 3350 17 G: 17 POWDER, FOR SOLUTION ORAL at 17:01

## 2023-04-28 RX ADMIN — CEFAZOLIN SODIUM 2 G: 2 INJECTION, SOLUTION INTRAVENOUS at 01:19

## 2023-04-28 RX ADMIN — OXYCODONE HYDROCHLORIDE 10 MG: 5 TABLET ORAL at 00:11

## 2023-04-28 RX ADMIN — ACETAMINOPHEN 975 MG: 325 TABLET ORAL at 20:36

## 2023-04-28 ASSESSMENT — ACTIVITIES OF DAILY LIVING (ADL)
ADLS_ACUITY_SCORE: 33
ADLS_ACUITY_SCORE: 32
ADLS_ACUITY_SCORE: 33
ADLS_ACUITY_SCORE: 32
ADLS_ACUITY_SCORE: 33
ADLS_ACUITY_SCORE: 32
ADLS_ACUITY_SCORE: 32
ADLS_ACUITY_SCORE: 33
ADLS_ACUITY_SCORE: 32
ADLS_ACUITY_SCORE: 32

## 2023-04-28 NOTE — PLAN OF CARE
Patient vital signs are at baseline: Yes  Patient able to ambulate as they were prior to admission or with assist devices provided by therapies during their stay:  Yes  Patient MUST void prior to discharge:  Yes  Patient able to tolerate oral intake:  Yes  Pain has adequate pain control using Oral analgesics:  Yes  Does patient have an identified :  Yes  Has goal D/C date and time been discussed with patient:  Yes    Pt is A&Ox4. Pt rated pain 3-7/10 during shift thus far. Managed with prn and scheduled medication. Blanchable redness on chest improving. Dressing has scant dried drainage. Sensation at baseline per pt. A1 with walker for transferring. Saline locked. Voiding adequately. Pt passed all 3 PVRs. Education on medication administration, alarms, and use of call-light to reduce risk for falls and injury. No further issues noted. CMS intact. VSS, no shortness of breath or nausea.

## 2023-04-28 NOTE — PROGRESS NOTES
04/28/23 1100   Appointment Info   Signing Clinician's Name / Credentials (PT) RENEE Gonzales   Student Supervision Direct supervision provided   Living Environment   People in Home alone   Current Living Arrangements house   Home Accessibility stairs to enter home;stairs within home   Number of Stairs, Main Entrance 8   Stair Railings, Main Entrance railing on right side (ascending)   Number of Stairs, Within Home, Primary greater than 10 stairs   Stair Railings, Within Home, Primary railing on left side (ascending)   Self-Care   Usual Activity Tolerance good   Current Activity Tolerance good   Fall history within last six months no   General Information   Onset of Illness/Injury or Date of Surgery 04/27/23   Referring Physician Philip Rico MD   Patient/Family Therapy Goals Statement (PT) improve function   Pertinent History of Current Problem (include personal factors and/or comorbidities that impact the POC) s/p L3-4, L4-5 fusion   Existing Precautions/Restrictions spinal   Cognition   Affect/Mental Status (Cognition) WNL   Strength (Manual Muscle Testing)   Strength Comments pt has history of drop foot on L LE with an orthosis and states L LE is weaker than R LE   Transfers   Transfers sit-stand transfer   Maintains Weight-bearing Status (Transfers) able to maintain   Impairments Contributing to Impaired Transfers decreased strength   Sit-Stand Transfer   Sit-Stand [place] (Transfers) contact guard   Assistive Device (Sit-Stand Transfers) walker, front-wheeled   Gait/Stairs (Locomotion)   [place] Level (Gait) contact guard   Assistive Device (Gait) walker, front-wheeled   Distance in Feet 10   Distance in Feet (Gait) 250   Pattern (Gait) swing-through   Deviations/Abnormal Patterns (Gait) stride length decreased;katie decreased   Maintains Weight-bearing Status (Gait) able to maintain   Negotiation (Stairs) descending technique;ascending technique   Clinical Impression    Criteria for Skilled Therapeutic Intervention Yes, treatment indicated   PT Diagnosis (PT) impaired functional mobility   Influenced by the following impairments decreased strength and balance and pain   Functional limitations due to impairments gait, transfers, stairs   Clinical Presentation (PT Evaluation Complexity) Stable/Uncomplicated   Clinical Presentation Rationale pt presents as medically diagnosed   Clinical Decision Making (Complexity) low complexity   Planned Therapy Interventions (PT) home exercise program;stair training;strengthening   Anticipated Equipment Needs at Discharge (PT) walker, rolling   Risk & Benefits of therapy have been explained evaluation/treatment results reviewed;care plan/treatment goals reviewed;patient   PT Total Evaluation Time   PT Eval, Low Complexity Minutes (71993) 10   Plan of Care Review   Plan of Care Reviewed With patient   Physical Therapy Goals   PT Frequency One time eval and treatment only   PT Predicted Duration/Target Date for Goal Attainment 04/28/23   PT Goals Transfers;Stairs;PT Goal 1;Gait   PT: Transfers Modified independent;Sit to/from stand;Assistive device;Within precautions;Goal Met   PT: Gait Modified independent;Assistive device;Rolling walker;100 feet;Within precautions;Goal Met   PT: Stairs Modified independent;Rail on right;Greater than 10 stairs;Goal Met   PT: Goal 1 pt MI/ independent with HEP for LE strength   Interventions   Interventions Quick Adds Gait Training;Therapeutic Activity;Therapeutic Procedure   Therapeutic Procedure/Exercise   Ther. Procedure: strength, endurance, ROM, flexibillity Minutes (73242) 15   Symptoms Noted During/After Treatment none   Treatment Detail/Skilled Intervention s/p spine fusion protocol exercises x 10 with B UE support/ MI after verbal cueing for technique   Therapeutic Activity   Treatment Detail/Skilled Intervention sit to/from stand MI FWW   Gait Training   Gait Training Minutes (50348) 15   Symptoms Noted  During/After Treatment (Gait Training) fatigue   Treatment Detail/Skilled Intervention pt ambulated 250' MI FWW and AFO on L LE due to hx of foot drop with education on spinal precautions, posture and proper use of FWW. Pt completed 16 stairs with R railing and SPC on L side when ascending MI with education on sequencing.   [place] Level (Gait Training) independent   Physical Assistance Level (Gait Training) verbal cues   Assistive Device (Gait Training) rolling walker   Pattern Analysis (Gait Training) swing-through gait   Gait Analysis Deviations decreased katie;decreased step length   Impairments (Gait Analysis/Training) other (see comments);strength decreased;pain  (use of AFO on L LE due to hx of foot drop)   Stair Railings present on right side   Physical Assist/Nonphysical Assist (Stairs) verbal cues   Level of [place] (Stairs) independent   Assistive Device (Stairs) straight cane   PT Discharge Planning   PT Plan DC from PT   PT Discharge Recommendation (DC Rec) home   PT Rationale for DC Rec pt able to transfer, ambulate and complete appropriate number of stairs in rder to return home safely.   PT Brief overview of current status pt ambulated MI ' use of L AFO, 16 stairs R railing and L SPC, independent with transfers.   Total Session Time   Timed Code Treatment Minutes 30   Total Session Time (sum of timed and untimed services) 40

## 2023-04-28 NOTE — PLAN OF CARE
Problem: Spinal Surgery  Goal: Absence of Bleeding  Outcome: Progressing     Pt is doing very well. Passed PT/OT. Good I&O.  Hemovac drainage is preventing discharge at this time.  115ml this shift.  Pain is managed with tylenol and oxycodone.  Pt is ambulating with SBA and a walker.

## 2023-04-28 NOTE — CONSULTS
[facility]  Consult Note - Hospitalist Service  Date of Admission:  4/27/2023  Consult Requested by: Orthopedics Spine  Reason for Consult: Postop medical cares    Assessment & Plan   Wm Gaines is a 62 year old old male with a history of chronic low back pain, spinal stenosis / degenerative disc disease, family hx of DM underwent L3-4 L4-5 fusion with general anesthesia. [facility] service was asked to evaluate patient for postoperative medical management as follows below. Please resume the home medications as reconciled and further noted below with ordered hold parameters.  Vital signs have been stable post-operatively including hemodynamically stable blood pressure and heart rate. Thank you for this consult; we will continue to follow this patient until discharge.    Family Hx of DM  -Noted  -PCP follow-up  -Screen with A1c if not already    S/p L3-4 L4-5 Fusion and Spine Surgery  Spinal Stenosis / Degenerative Disc Disease  Acute Post-Op Pain  -Agree with current pain control regimen; consider acute pain team consultation if pain becomes increasingly difficult to control or proves refractory.   -PT evaluation.   -OT evaluation.  -IS frequently, initially every hour while awake as tolerated. Directly encouraged and discussed.      Post-Op DVT Prophylaxis  -As per primary surgery team.    Procedure(s):  LEFT LUMBAR 3-4 AND LEFT LUMBAR 4-5 TRANSFORAMINAL LUMBAR INTERBODY FUSION WITH LAMINECTOMY USING STEALTH NAVIGATION  Post-operative Day: 1 Day Post-Op  Code status:Full Code     Type of Anesthesia   General    Estimated Blood Loss:  410 mL    Hospital Problem List   No problem-specific Assessment & Plan notes found for this encounter.    Principal Problem:    Status post lumbar surgery  Active Problems:    Family history of diabetes mellitus      -Reviewed the patient's preoperative H and P and updated missing elements.  -Home medication reconciliation has been reviewed.  Medications have  "been ordered as noted from the home list and changes are documented above        Clinically Significant Risk Factors Present on Admission                       # Overweight: Estimated body mass index is 29.74 kg/m  as calculated from the following:    Height as of this encounter: 1.84 m (6' 0.44\").    Weight as of this encounter: 100.7 kg (221 lb 15.7 oz).           Stephan Dejesus MD  Hospitalist Service  Securely message with iRates (more info)  Text page via [facility] Paging/Directory   ______________________________________________________________________    Chief Complaint   Postop medical cares    History is obtained from the patient and EMR     History of Present Illness   Wm Gaines is a 62 year old old male with a history of chronic low back pain, spinal stenosis / degenerative disc disease, family hx of DM underwent L3-4 L4-5 fusion with general anesthesia.     He has been in his usual state of health prior to presenting for this elective surgery.  He denies any associated symptoms prior to coming in today, and also denies any recent travel domestically or internationally, and also denies any recent sick contacts around him including any family or friends who have been sick.  When asked, he denies any fevers, rigors, chest pain or shortness of breath, nausea or vomiting or abdominal pain, changes in bowel movements/pattern, urinary symptoms, focal weakness, or any other new and associated symptoms at this time.  He has been compliant with his medications.  14 point review of systems performed with the patient without any other pertinent positives at this time.    Presently, he states his pain is currently well-tolerated.  He denies any new complaints or symptoms at this time. His pain is 2 out of 10, endorsed as tolerable, and he is sitting upright in bed.     His social history, family history, and past medical history were directly reviewed with him and corroborated to be accurate as documented below. " All questions he had at this time were answered to his verbalized and stated satisfaction and understanding.       Past Medical History    Past Medical History:   Diagnosis Date     Arthritis        Past Surgical History   Past Surgical History:   Procedure Laterality Date     ARTHROSCOPY SHOULDER ROTATOR CUFF REPAIR Right 11/01/2019     JOINT REPLACEMENT Left 02/06/2019    TKA     SOFT TISSUE SURGERY  11/22/2019    Rotator cuff surgery left shoulder     [facility] TOTAL HIP ARTHROPLASTY Right 09/18/2018    Procedure: RIGHT TOTAL HIP ARTHROPLASTY, POSTERIOR APPROACH;  Surgeon: Gerard Evans MD;  Location: [facility] OR;  Service: Orthopedics     [facility] TOTAL HIP ARTHROPLASTY Left 10/02/2019    Procedure: LEFT DIRECT ANTERIOR TOTAL HIP ARTHROPLASTY;  Surgeon: Gerard Evans MD;  Location: [facility] Main OR;  Service: Orthopedics     [facility] TOTAL KNEE ARTHROPLASTY Left 02/06/2019    Procedure: LEFT TOTAL KNEE ARTHROPLASTY;  Surgeon: Gerard Evans MD;  [facility] OR;  Service: Orthopedics     [facility] TOTAL KNEE ARTHROPLASTY Right 11/04/2020    Procedure: RIGHT TOTAL KNEE ARTHROPLASTY;  Surgeon: Gerard Evans MD;  Location: [facility] OR;  Service: Orthopedics       Medications   I have reviewed this patient's current medications  Medications Prior to Admission   Medication Sig Dispense Refill Last Dose     ACETAMINOPHEN EXTRA STRENGTH 500 MG tablet Take 1 to 2 tablets by mouth three times daily as needed for mild pain. 540 tablet 1              Physical Exam   Vital Signs: Temp: 97.6  F (36.4  C) Temp src: Temporal BP: (!) 141/73 Pulse: 97   Resp: 24 SpO2: 95 % O2 Device: Nasal cannula Oxygen Delivery: 4 LPM  Weight: 221 lbs 15.7 oz    GENERAL:  Alert, appears comfortable, in no acute distress, appears stated age   HEAD:  Normocephalic, without obvious abnormality, atraumatic   EYES:  PERRL, conjunctiva/corneas clear, no scleral icterus, EOM's intact   NOSE: Nares normal, septum midline, mucosa normal, no  "drainage   THROAT: Lips, mucosa, and tongue normal; teeth and gums normal, mouth moist   NECK: Supple, symmetrical, trachea midline   BACK:   Symmetric, no curvature, ROM normal   LUNGS:   Clear to auscultation bilaterally, no rales, rhonchi, or wheezing, symmetric chest rise on inhalation, respirations unlabored   CHEST WALL:  No tenderness or deformity   HEART:  Regular rate and rhythm, S1 and S2 normal, no murmur, rub, or gallop    ABDOMEN:   Soft, non-tender, bowel sounds active all four quadrants, no masses, no organomegaly, no rebound or guarding   EXTREMITIES: Extremities normal, atraumatic, no cyanosis or edema    SKIN: Dry to touch, no exanthems in the visualized areas   NEURO: Alert, oriented x 4, moves all four extremities freely/spontaneously with some limitation from position, pain, bedrest while sitting up in bed and anesthesia exiting   PSYCH: Cooperative, behavior is appropriate        Medical Decision Making     76 MINUTES SPENT BY ME on the date of service doing chart review, history, exam, documentation & further activities per the note.      Data     I have personally reviewed the following data over the past 24 hrs:    N/A  \   12.1 (L)   / N/A     N/A N/A N/A /  N/A   N/A N/A N/A \       Imaging results reviewed over the past 24 hrs:   Recent Results (from the past 24 hour(s))   POC US Guidance Needle Placement    Narrative    Ultrasound was performed as guidance to an anesthesia procedure.  Click   \"PACS images\" hyperlink below to view any stored images.  For specific   procedure details, view procedure note authored by anesthesia.   XR Surgery JOY  Fluoro G/T 5 Min    Narrative    This exam was marked as non-reportable because it will not be read by a   radiologist or a [facility] non-radiologist provider.           "

## 2023-04-28 NOTE — PLAN OF CARE
Problem: Spinal Surgery  Goal: Optimal Coping with Surgery  Outcome: Progressing     Problem: Spinal Surgery  Goal: Optimal Pain Control and Function  Outcome: Progressing  Intervention: Prevent or Manage Pain  Recent Flowsheet Documentation  Taken 4/27/2023 2018 by Christine Maya RN  Pain Management Interventions: cold applied   Goal Outcome Evaluation:  A&O x4. Mood pleasant.VSS. On room air. Tolerating clear liquid diet. Hemovac intact. Ochoa catheter discontinued. Verbalized mild pain during shift. Managed with ice and scheduled pain medications.

## 2023-04-28 NOTE — PROGRESS NOTES
Physical Therapy Discharge Summary    Reason for therapy discharge:    Discharged to home.  All goals and outcomes met, no further needs identified.    Progress towards therapy goal(s). See goals on Care Plan in [facility] electronic health record for goal details.  Goals met    Therapy recommendation(s):    Continue home exercise program.

## 2023-04-28 NOTE — PROGRESS NOTES
S: Pt is a 62 yom who was seen at [facility], room 374,  for delivery of an Apsen quik draw Rap LSO prescribed by Dr. Rico.  DX: L3/L4 spinal fusion surgery  O: 6 0 . 220 lbs. Pt had L3/L4 spinal fusion surgery.  A: Waist measurement taken of patient and a size Large Apsen quik draw Rap LSO was fit appropriately to the patient. Donning/doffing and wear/care instructions were discussed with the patient. Written instructions were also delivered to the patient. Pt was satisfied with fit and function of device.  P: Pt to follow-up as needed. All questions were answered at this time.  G: Provide hydrostatic compression to stabilize surgical site and relieve lumbar pain.    Electronically signed by Saundra Zavala Board Eligible Prosthetist Orthotist

## 2023-04-28 NOTE — ANESTHESIA CARE TRANSFER NOTE
Patient: Wm Gaines    Procedure: Procedure(s):  LEFT LUMBAR 3-4 AND LEFT LUMBAR 4-5 TRANSFORAMINAL LUMBAR INTERBODY FUSION WITH LAMINECTOMY USING STEALTH NAVIGATION       Diagnosis: Spinal stenosis, lumbar [M48.061]  Spondylolisthesis [M43.10]  Diagnosis Additional Information: No value filed.    Anesthesia Type:   General     Note:    Oropharynx: oropharynx clear of all foreign objects and spontaneously breathing  Level of Consciousness: awake  Oxygen Supplementation: face mask  Level of Supplemental Oxygen (L/min / FiO2): 6  Independent Airway: airway patency satisfactory and stable  Dentition: dentition unchanged  Vital Signs Stable: post-procedure vital signs reviewed and stable  Report to RN Given: handoff report given  Patient transferred to: PACU    Handoff Report: Identifed the Patient, Identified the Reponsible Provider, Reviewed the pertinent medical history, Discussed the surgical course, Reviewed Intra-OP anesthesia mangement and issues during anesthesia, Set expectations for post-procedure period and Allowed opportunity for questions and acknowledgement of understanding      Vitals:  Vitals Value Taken Time   /73 04/27/23 1859   Temp     Pulse 97 04/27/23 1900   Resp 26 04/27/23 1900   SpO2 98 % 04/27/23 1900   Vitals shown include unvalidated device data.    Electronically Signed By: ARIANNA Busby CRNA  April 27, 2023  7:01 PM

## 2023-04-28 NOTE — PROGRESS NOTES
04/28/23 1045   Appointment Info   Signing Clinician's Name / Credentials (OT) Mindy Hills, OTR/L   Living Environment   People in Home alone   Current Living Arrangements apartment   Living Environment Comments has rts with vanity, tub shower with chair and hand held shower, reacher and long shoe horn   Self-Care   Usual Activity Tolerance good   Current Activity Tolerance good   Activity/Exercise/Self-Care Comment Pt was independent with ADLS and IADLS prior to admit   Instrumental Activities of Daily Living (IADL)   IADL Comments pt did a lot prior to surgery.  Has a friend and nephew who can help as needed.   General Information   Onset of Illness/Injury or Date of Surgery 04/27/23   Referring Physician Philip Rico   Patient/Family Therapy Goal Statement (OT) heal well   Additional Occupational Profile Info/Pertinent History of Current Problem Pt admitted for elective lumbar fusion, L3-4 and 4-5.  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 AFO   Existing Precautions/Restrictions spinal;no pivoting or twisting   Cognitive Status Examination   Affect/Mental Status (Cognitive) WNL   Range of Motion Comprehensive   General Range of Motion no range of motion deficits identified   Strength Comprehensive (MMT)   General Manual Muscle Testing (MMT) Assessment no strength deficits identified   Bed Mobility   Comment (Bed Mobility) min   Transfers   Transfer Comments min   Activities of Daily Living   BADL Assessment/Intervention lower body dressing;toileting   Lower Body Dressing Assessment/Training   [place] Level (Lower Body Dressing) minimum assist (75% patient effort)   Toileting   [place] Level (Toileting) minimum assist (75% patient effort)   Clinical Impression   Criteria for Skilled Therapeutic Interventions Met (OT) Yes, treatment indicated   OT Diagnosis Decreased independence with adls   OT Problem List-Impairments impacting ADL post-surgical  precautions   Assessment of Occupational Performance 3-5 Performance Deficits   Identified Performance Deficits dressing, transfers, bed mobility, toileting   Planned Therapy Interventions (OT) ADL retraining;bed mobility training;transfer training;home program guidelines   Clinical Decision Making Complexity (OT) moderate complexity   Anticipated Equipment Needs Upon Discharge (OT)   (sock aid)   Risk & Benefits of therapy have been explained evaluation/treatment results reviewed;care plan/treatment goals reviewed;risks/benefits reviewed;current/potential barriers reviewed;participants voiced agreement with care plan;participants included;patient   OT Total Evaluation Time   OT Eval, Moderate Complexity Minutes (27809) 15   OT Goals   Therapy Frequency (OT) One time eval and treatment   OT Goals Lower Body Dressing;Bed Mobility;Toilet Transfer/Toileting   OT: Lower Body Dressing Modified independent;within precautions;using adaptive equipment;Goal Met;Completed   OT: Bed Mobility Modified independent;supine to/from sitting;rolling;bridging;within precautions;Goal Met;Completed   OT: Toilet Transfer/Toileting Modified independent;toilet transfer;cleaning and garment management;within precautions;Goal Met;Completed   Interventions   Interventions Quick Adds Self-Care/Home Management   Self-Care/Home Management   Self-Care/Home Mgmt/ADL, Compensatory, Meal Prep Minutes (64079) 30   Symptoms Noted During/After Treatment (Meal Preparation/Planning Training) none   Treatment Detail/Skilled Intervention Taught pt how to perform adls following spine precautions: total body dressing using adaptive equipment, including ice , transfers to and from bed, chair, toilet, car, and bed mobility using the log roll.  Pt mod I with all after OT intervention.  Gave handouts and answered all questions. Pt considering if he wants to buy a sock aid from rehab or get it elsewhere.   OT Discharge Planning   OT Plan DC OT   OT  Discharge Recommendation (DC Rec) (S)  home   OT Rationale for DC Rec pt moving well and has  a nephew and a neighbor who can assist as needed.   OT Brief overview of current status Pt is mod I with basic adls and functional mobility after OT intervention.   Total Session Time   Timed Code Treatment Minutes 30   Total Session Time (sum of timed and untimed services) 45

## 2023-04-28 NOTE — PROGRESS NOTES
Medicine consult received; patient vitals stable and med rec completed with hold parameters. Full consult to follow in AM. Please page HMS with any immediate concerns or questions.     Stephan Dejesus MD  Internal Medicine  Hospitalist  [facility]  Phone: #[phone]  Securely message with the Vocera Web Console (learn more here)  Text page via [employer] Paging/Directory

## 2023-04-28 NOTE — PROGRESS NOTES
"Orthopedic Progress Note      Assessment: 1 Day Post-Op  S/P Procedure(s):  LEFT LUMBAR 3-4 AND LEFT LUMBAR 4-5 TRANSFORAMINAL LUMBAR INTERBODY FUSION WITH LAMINECTOMY USING STEmana.bo NAVIGATION @    Plan:   - Continue PT/OT  - Weightbearing status: WBAT can use brace for comfort  - No bending, twisting or lifting more than 10 pounds for 6 weeks.  - Drain can be removed when output is <30cc for 2 consecutive shifts or POD3  - Anticoagulation: no chemical prophylaxis in addition to SCDs, milagro stockings and early ambulation.  - Antibiotics: 2 grams of Ancef IV q 8 hours until drain is removed   - Orthosis consult placed for LSO brace  - Pain control at discharge: Oxycodone 5 mg 1-2 tabs Q4-6 hours x30-32 tabs, Hydroxyzine 25 mg QID PRN, Gabapentin 100 mg TID, Tizanidine 4 mg TID, Acetaminophen 1000 mg TID  - Discharge planning: pending drain output, BM, pain control and progression in therapy     Subjective:  Pain: mild  Chest pain, SOB: no  Nausea, Vomiting:  no  Lightheadedness, Dizziness:  no  Neuro:  Patient denies new onset numbness or paresthesias    Patient is doing well postop day 1 from his spine surgery yesterday.  Notes that prior to surgery he was having primarily left-sided weakness of his legs, numbness and tingling.  He notes that the numbness and tingling is slightly improved from surgery but the weakness is still present.  He has not been up with therapy yet today.  He is passing gas but no bowel movement yet.    Objective:  /68 (BP Location: Right arm)   Pulse 89   Temp 98  F (36.7  C) (Oral)   Resp 18   Ht 1.84 m (6' 0.44\")   Wt 100.7 kg (221 lb 15.7 oz)   SpO2 95%   BMI 29.74 kg/m    The patient is A&Ox3. Appears comfortable.   Sensation is intact L2-S1 bilaterally  Dorsiflexion and plantar flexion is intact.  LLE Motor: 5/5 quads, 5/5 hip flexors, 5/5 gastroc, 4/5 tib ant, 4/5 ehl  RLE Motor: 5/5 quads, 5/5 hip flexors, 5/5 gastroc, 5/5 tib ant, 5/5 ehl   Dorsalis pedis pulse " intact.  Calves are soft and non-tender. Negative Jovanna's.  The incision is covered. Dressing C/D/I.    Drain with output of 230 overnight    Pertinent Labs   Lab Results: personally reviewed.   Lab Results   Component Value Date    INR 0.94 09/10/2019    INR 0.96 01/14/2019    INR 0.98 09/04/2018     Lab Results   Component Value Date    WBC 6.0 11/02/2020    HGB 12.1 (L) 04/28/2023    HCT 45.7 11/02/2020    MCV 99 11/02/2020     11/02/2020     Lab Results   Component Value Date     04/05/2023    CO2 25 04/05/2023         Report completed by:  Daisy Mike PA-C/Dr. Rico  [facility]    Date: 4/28/2023  Time: 9:54 AM

## 2023-04-28 NOTE — ANESTHESIA POSTPROCEDURE EVALUATION
Patient: Wm Gaines    Procedure: Procedure(s):  LEFT LUMBAR 3-4 AND LEFT LUMBAR 4-5 TRANSFORAMINAL LUMBAR INTERBODY FUSION WITH LAMINECTOMY USING STEALTH NAVIGATION       Anesthesia Type:  General    Note:     Postop Pain Control: Uneventful            Sign Out: Well controlled pain   PONV: No   Neuro/Psych: Uneventful            Sign Out: Acceptable/Baseline neuro status   Airway/Respiratory: Uneventful            Sign Out: Acceptable/Baseline resp. status   CV/Hemodynamics: Uneventful            Sign Out: Acceptable CV status; No obvious hypovolemia; No obvious fluid overload   Other NRE: NONE   DID A NON-ROUTINE EVENT OCCUR? No           Last vitals:  Vitals Value Taken Time   /70 04/27/23 2000   Temp 36.4  C (97.5  F) 04/27/23 2000   Pulse 101 04/27/23 2001   Resp 24 04/27/23 2000   SpO2 95 % 04/27/23 2002   Vitals shown include unvalidated device data.    Electronically Signed By: Adelaida Gurrola MD  April 27, 2023  9:36 PM

## 2023-04-28 NOTE — PROGRESS NOTES
Care Management Follow Up    Length of Stay (days): 1    Expected Discharge Date: 04/29/2023     Concerns to be Addressed: no discharge needs identified     Patient plan of care discussed at interdisciplinary rounds: No    Anticipated Discharge Disposition: Home     Anticipated Discharge Services: None  Anticipated Discharge DME: None    Patient/family educated on Medicare website which has current facility and service quality ratings: no  Education Provided on the Discharge Plan: Yes  Patient/Family in Agreement with the Plan: yes    Referrals Placed by CM/SW:    Private pay costs discussed: Not applicable    Additional Information:  Chart Review: Pt lives alone in house. No CM needs identified or desired. CM to follow with any additional needs upon discharge. Family to transport.     Karen Messina

## 2023-04-29 LAB
HGB BLD-MCNC: 12.5 G/DL (ref 13.3–17.7)
HOLD SPECIMEN: NORMAL

## 2023-04-29 PROCEDURE — 99207 PR NO BILLABLE SERVICE THIS VISIT: CPT | Performed by: HOSPITALIST

## 2023-04-29 PROCEDURE — 85018 HEMOGLOBIN: CPT | Performed by: PHYSICIAN ASSISTANT

## 2023-04-29 PROCEDURE — 250N000013 HC RX MED GY IP 250 OP 250 PS 637: Performed by: PHYSICIAN ASSISTANT

## 2023-04-29 PROCEDURE — 250N000013 HC RX MED GY IP 250 OP 250 PS 637: Performed by: INTERNAL MEDICINE

## 2023-04-29 PROCEDURE — 250N000011 HC RX IP 250 OP 636

## 2023-04-29 PROCEDURE — 120N000001 HC R&B MED SURG/OB

## 2023-04-29 PROCEDURE — 36415 COLL VENOUS BLD VENIPUNCTURE: CPT | Performed by: PHYSICIAN ASSISTANT

## 2023-04-29 RX ADMIN — ACETAMINOPHEN 975 MG: 325 TABLET ORAL at 12:13

## 2023-04-29 RX ADMIN — OXYCODONE HYDROCHLORIDE 5 MG: 5 TABLET ORAL at 19:28

## 2023-04-29 RX ADMIN — ACETAMINOPHEN 975 MG: 325 TABLET ORAL at 19:30

## 2023-04-29 RX ADMIN — GABAPENTIN 100 MG: 100 CAPSULE ORAL at 20:21

## 2023-04-29 RX ADMIN — GABAPENTIN 100 MG: 100 CAPSULE ORAL at 14:55

## 2023-04-29 RX ADMIN — DOCUSATE SODIUM 50MG AND SENNOSIDES 8.6MG 1 TABLET: 8.6; 5 TABLET, FILM COATED ORAL at 08:29

## 2023-04-29 RX ADMIN — CEFAZOLIN SODIUM 2 G: 2 INJECTION, SOLUTION INTRAVENOUS at 10:18

## 2023-04-29 RX ADMIN — GABAPENTIN 100 MG: 100 CAPSULE ORAL at 08:28

## 2023-04-29 RX ADMIN — THERA TABS 1 TABLET: TAB at 08:28

## 2023-04-29 RX ADMIN — DOCUSATE SODIUM 50MG AND SENNOSIDES 8.6MG 1 TABLET: 8.6; 5 TABLET, FILM COATED ORAL at 20:21

## 2023-04-29 RX ADMIN — CALCIUM CARBONATE (ANTACID) CHEW TAB 500 MG 500 MG: 500 CHEW TAB at 20:21

## 2023-04-29 RX ADMIN — CEFAZOLIN SODIUM 2 G: 2 INJECTION, SOLUTION INTRAVENOUS at 01:40

## 2023-04-29 RX ADMIN — ACETAMINOPHEN 975 MG: 325 TABLET ORAL at 05:12

## 2023-04-29 RX ADMIN — OXYCODONE HYDROCHLORIDE 5 MG: 5 TABLET ORAL at 08:28

## 2023-04-29 RX ADMIN — CEFAZOLIN SODIUM 2 G: 2 INJECTION, SOLUTION INTRAVENOUS at 16:40

## 2023-04-29 ASSESSMENT — ACTIVITIES OF DAILY LIVING (ADL)
ADLS_ACUITY_SCORE: 32

## 2023-04-29 NOTE — PROGRESS NOTES
"ORTHO POD 2    FEELS WELL  DRAIN STILL IN  /55 (BP Location: Right arm)   Pulse 100   Temp 98.6  F (37  C) (Oral)   Resp 16   Ht 1.84 m (6' 0.44\")   Wt 100.7 kg (221 lb 15.7 oz)   SpO2 90%   BMI 29.74 kg/m      MOVING WELL   DRAIN OUTPUT STILL OVER 30CC/SHIFT    PLAN - discharge DRAIN WHEN <30 CC FOR 2 SHIFTS OR TOMORROW.  "

## 2023-04-29 NOTE — PLAN OF CARE
Patient vital signs are at baseline: Yes  Patient able to ambulate as they were prior to admission or with assist devices provided by therapies during their stay:  Yes  Patient MUST void prior to discharge:  Yes  Patient able to tolerate oral intake:  Yes  Pain has adequate pain control using Oral analgesics:  Yes  Does patient have an identified :  Yes  Has goal D/C date and time been discussed with patient:  Yes    Pt had a BM this evening after receiving Miralax. Hemovac output of 70mL. Ambulated in hallway.

## 2023-04-29 NOTE — PLAN OF CARE
Problem: Plan of Care - These are the overarching goals to be used throughout the patient stay.    Goal: Optimal Comfort and Wellbeing  Intervention: Monitor Pain and Promote Comfort  Recent Flowsheet Documentation  Taken 4/29/2023 0557 by Kellie Ahumada RN  Pain Management Interventions:   cold applied   repositioned  Taken 4/29/2023 0145 by Kellie Ahumada RN  Pain Management Interventions:   care clustered   cold applied   pillow support provided   rest   repositioned   Goal Outcome Evaluation:    AOX4 on room air. Pain treated w/ scheduled pain meds. Hemovac output at 40ml. Home tomorrow pending drain.

## 2023-04-29 NOTE — PROGRESS NOTES
[facility]    Medicine Progress Note - Hospitalist Service    Date of Admission:  4/27/2023    Assessment & Plan   Wm Gaines is a 62 year old old male with a history of chronic low back pain, spinal stenosis / degenerative disc disease, family hx of DM underwent L3-4 L4-5 fusion with general anesthesia. [facility] service was asked to evaluate patient for postoperative medical management as follows below. Please resume the home medications as reconciled and further noted below with ordered hold parameters.  Vital signs have been stable post-operatively including hemodynamically stable blood pressure and heart rate. Thank you for this consult; we will continue to follow this patient until discharge.    Per report and review of EMR, patient's pain remains well-tolerated, patient is up and ambulating, patient has normal oral intake with usual eating and drinking without issue, clinical status is unchanged, and there are no interval changes. Chart check. No medical objections to discharge at the discretion of the primary surgery team. Please page and update [facility] with any clinical status changes or questions.       Family Hx of DM  -Noted  -PCP follow-up  -Screen with A1c if not already    S/p L3-4 L4-5 Fusion and Spine Surgery  Spinal Stenosis / Degenerative Disc Disease  Acute Post-Op Pain  -Agree with current pain control regimen; consider acute pain team consultation if pain becomes increasingly difficult to control or proves refractory.   -PT evaluation.   -OT evaluation.  -IS frequently, initially every hour while awake as tolerated. Directly encouraged and discussed.      Post-Op DVT Prophylaxis  -As per primary surgery team.    Procedure(s):  LEFT LUMBAR 3-4 AND LEFT LUMBAR 4-5 TRANSFORAMINAL LUMBAR INTERBODY FUSION WITH LAMINECTOMY USING STEALTH NAVIGATION  Post-operative Day: 1 Day Post-Op  Code status:Full Code     Type of Anesthesia   General    Estimated Blood Loss:  410  "    Hospital Problem List   No problem-specific Assessment & Plan notes found for this encounter.    Principal Problem:    Status post lumbar surgery  Active Problems:    Family history of diabetes mellitus      -Reviewed the patient's preoperative H and P and updated missing elements.  -Home medication reconciliation has been reviewed.  Medications have been ordered as noted from the home list and changes are documented above          Diet: Advance Diet as Tolerated: Regular Diet Adult  Discharge Instruction - Regular Diet Adult    DVT Prophylaxis: Defer to primary service  Ochoa Catheter: Not present  Lines: None     Cardiac Monitoring: None  Code Status: Full Code      Clinically Significant Risk Factors                        # Overweight: Estimated body mass index is 29.74 kg/m  as calculated from the following:    Height as of this encounter: 1.84 m (6' 0.44\").    Weight as of this encounter: 100.7 kg (221 lb 15.7 oz)., PRESENT ON ADMISSION         Disposition Plan      Expected Discharge Date: 04/30/2023      Destination: home  Discharge Comments: Hemovac          Stephan Dejesus MD  Hospitalist Service  [facility]  Securely message with [employer] (more info)  Text page via [employer] Paging/Directory   ______________________________________________________________________    Interval History   No acute events overnight.    No report of chest pain, shortness of breath, or other new complaints reported.    Physical Exam   Vital Signs: Temp: 98.6  F (37  C) Temp src: Oral BP: 112/55 Pulse: 100   Resp: 16 SpO2: 90 % O2 Device: None (Room air)    Weight: 221 lbs 15.7 oz    Chart check.    Medical Decision Making     No LOS charge, chart check.         Data     I have personally reviewed the following data over the past 24 hrs:    N/A  \   12.1 (L)   / N/A     N/A N/A N/A /  N/A   N/A N/A N/A \       Imaging results reviewed over the past 24 hrs:   No results found for this or any previous " visit (from the past 24 hour(s)).

## 2023-04-30 VITALS
HEART RATE: 97 BPM | WEIGHT: 221.98 LBS | BODY MASS INDEX: 30.07 KG/M2 | RESPIRATION RATE: 16 BRPM | OXYGEN SATURATION: 97 % | HEIGHT: 72 IN | TEMPERATURE: 97.7 F | SYSTOLIC BLOOD PRESSURE: 130 MMHG | DIASTOLIC BLOOD PRESSURE: 76 MMHG

## 2023-04-30 PROCEDURE — 99207 PR NO BILLABLE SERVICE THIS VISIT: CPT | Performed by: HOSPITALIST

## 2023-04-30 PROCEDURE — 250N000011 HC RX IP 250 OP 636

## 2023-04-30 PROCEDURE — 250N000013 HC RX MED GY IP 250 OP 250 PS 637: Performed by: PHYSICIAN ASSISTANT

## 2023-04-30 RX ADMIN — CEFAZOLIN SODIUM 2 G: 2 INJECTION, SOLUTION INTRAVENOUS at 09:36

## 2023-04-30 RX ADMIN — ACETAMINOPHEN 975 MG: 325 TABLET ORAL at 12:20

## 2023-04-30 RX ADMIN — ACETAMINOPHEN 975 MG: 325 TABLET ORAL at 04:39

## 2023-04-30 RX ADMIN — CEFAZOLIN SODIUM 2 G: 2 INJECTION, SOLUTION INTRAVENOUS at 03:30

## 2023-04-30 RX ADMIN — OXYCODONE HYDROCHLORIDE 5 MG: 5 TABLET ORAL at 04:39

## 2023-04-30 RX ADMIN — THERA TABS 1 TABLET: TAB at 09:35

## 2023-04-30 RX ADMIN — GABAPENTIN 100 MG: 100 CAPSULE ORAL at 09:35

## 2023-04-30 ASSESSMENT — ACTIVITIES OF DAILY LIVING (ADL)
ADLS_ACUITY_SCORE: 32

## 2023-04-30 NOTE — PLAN OF CARE
Problem: Plan of Care - These are the overarching goals to be used throughout the patient stay.    Goal: Absence of Hospital-Acquired Illness or Injury  Intervention: Identify and Manage Fall Risk  Recent Flowsheet Documentation  Taken 4/29/2023 1700 by Justyn Rollins RN  Safety Promotion/Fall Prevention:    assistive device/personal items within reach    clutter free environment maintained    nonskid shoes/slippers when out of bed    patient and family education    room door open    room near nurse's station    safety round/check completed   Patient vital signs are at baseline: Yes  Patient able to ambulate as they were prior to admission or with assist devices provided by therapies during their stay:  Yes  Patient MUST void prior to discharge:  Yes  Patient able to tolerate oral intake:  Yes  Pain has adequate pain control using Oral analgesics:  Yes  Does patient have an identified :  Yes  Has goal D/C date and time been discussed with patient:  Yes Goal Outcome Evaluation:      Plan of Care Reviewed With: patient    Overall Patient Progress: improvingOverall Patient Progress: improving pt  remains stable, vss on room air,  has heamovac  in place. Pt independent.

## 2023-04-30 NOTE — PROGRESS NOTES
[facility]    Medicine Progress Note - Hospitalist Service    Date of Admission:  4/27/2023    Assessment & Plan   Wm Gaines is a 62 year old old male with a history of chronic low back pain, spinal stenosis / degenerative disc disease, family hx of DM underwent L3-4 L4-5 fusion with general anesthesia. [facility] service was asked to evaluate patient for postoperative medical management as follows below. Please resume the home medications as reconciled and further noted below with ordered hold parameters.  Vital signs have been stable post-operatively including hemodynamically stable blood pressure and heart rate. Thank you for this consult; we will continue to follow this patient until discharge.    Per report and review of EMR, patient's pain continues to remain well-tolerated, patient is up and ambulating, patient has normal oral intake with usual eating and drinking without issue, clinical status is unchanged, and there are no interval changes. Chart check. No medical objections to discharge at the discretion of the primary surgery team. Please page and update [facility] with any clinical status changes or questions. Med rec completed already.     Family Hx of DM  -Noted  -PCP follow-up  -Screen with A1c if not already    S/p L3-4 L4-5 Fusion and Spine Surgery  Spinal Stenosis / Degenerative Disc Disease  Acute Post-Op Pain  -Agree with current pain control regimen; consider acute pain team consultation if pain becomes increasingly difficult to control or proves refractory.   -PT evaluation.   -OT evaluation.  -IS frequently, initially every hour while awake as tolerated. Directly encouraged and discussed.      Post-Op DVT Prophylaxis  -As per primary surgery team.    Procedure(s):  LEFT LUMBAR 3-4 AND LEFT LUMBAR 4-5 TRANSFORAMINAL LUMBAR INTERBODY FUSION WITH LAMINECTOMY USING STEALTH NAVIGATION  Post-operative Day: 1 Day Post-Op  Code status:Full Code     Type of Anesthesia  "  General    Estimated Blood Loss:  410 mL    Hospital Problem List   No problem-specific Assessment & Plan notes found for this encounter.    Principal Problem:    Status post lumbar surgery  Active Problems:    Family history of diabetes mellitus      -Reviewed the patient's preoperative H and P and updated missing elements.  -Home medication reconciliation has been reviewed.  Medications have been ordered as noted from the home list and changes are documented above          Diet: Advance Diet as Tolerated: Regular Diet Adult  Discharge Instruction - Regular Diet Adult    DVT Prophylaxis: Defer to primary service  Ochoa Catheter: Not present  Lines: None     Cardiac Monitoring: None  Code Status: Full Code      Clinically Significant Risk Factors                        # Overweight: Estimated body mass index is 29.74 kg/m  as calculated from the following:    Height as of this encounter: 1.84 m (6' 0.44\").    Weight as of this encounter: 100.7 kg (221 lb 15.7 oz)., PRESENT ON ADMISSION         Disposition Plan             Stephan Dejesus MD  Hospitalist Service  [facility]  Securely message with [employer] (more info)  Text page via [employer] Paging/Directory   ______________________________________________________________________    Interval History   No acute events overnight.    No report of chest pain, shortness of breath, or other new complaints reported.     Physical Exam   Vital Signs: Temp: 97.7  F (36.5  C) Temp src: Oral BP: 130/76 Pulse: 97   Resp: 16 SpO2: 97 % O2 Device: None (Room air)    Weight: 221 lbs 15.7 oz    Chart check.    Medical Decision Making     No LOS charge, chart check.         Data     I have personally reviewed the following data over the past 24 hrs:    N/A  \   N/A   / N/A     N/A N/A N/A /  N/A   N/A N/A N/A \       Imaging results reviewed over the past 24 hrs:   No results found for this or any previous visit (from the past 24 hour(s)).  "

## 2023-04-30 NOTE — PLAN OF CARE
Problem: Plan of Care - These are the overarching goals to be used throughout the patient stay.    Goal: Optimal Comfort and Wellbeing  Intervention: Monitor Pain and Promote Comfort  Recent Flowsheet Documentation  Taken 4/30/2023 0115 by Kellie Ahumada RN  Pain Management Interventions: medication (see MAR)     Problem: Spinal Surgery  Goal: Optimal Pain Control and Function  Intervention: Prevent or Manage Pain  Recent Flowsheet Documentation  Taken 4/30/2023 0115 by Kellie Ahumada RN  Pain Management Interventions: medication (see MAR)   Goal Outcome Evaluation:      Improving Overall Patient Progress: A&OX4 and vss on room air. Has heamovac in place w/ 30ml output. Pt independent. Discharge pending on hemovac removal.

## 2023-04-30 NOTE — PROGRESS NOTES
"Ortho Progress Note      Post-operative Day: 3 Days Post-Op  S/P Procedure(s):  LEFT LUMBAR 3-4 AND LEFT LUMBAR 4-5 TRANSFORAMINAL LUMBAR INTERBODY FUSION WITH LAMINECTOMY USING STEALTH NAVIGATION      Subjective:  Pain: Mild   Chest pain, SOB:  No   Nausea, vomiting:  No   Lightheadedness, dizziness:  No  Neuro:  Patient denies new onset numbness or paresthesias    Objective:  /73 (BP Location: Right arm)   Pulse 88   Temp 98.5  F (36.9  C) (Oral)   Resp 16   Ht 1.84 m (6' 0.44\")   Wt 100.7 kg (221 lb 15.7 oz)   SpO2 93%   BMI 29.74 kg/m    Gen: A&O x 3. NAD. Appears doing well   Wound status: Covered.  No signs of active drainage.  Drain intact.    Circulation, motion and sensation: Dorsiflexion/plantarflexion intact and equal bilaterally; distal lower extremity sensation is intact and equal bilaterally   Swelling:    Calf tenderness: calves are soft and non-tender bilaterally     Pertinent Labs   Lab Results: personally reviewed.   Lab Results   Component Value Date    INR 0.94 09/10/2019    INR 0.96 01/14/2019    INR 0.98 09/04/2018     Lab Results   Component Value Date    WBC 6.0 11/02/2020    HGB 12.5 (L) 04/29/2023    HCT 45.7 11/02/2020    MCV 99 11/02/2020     11/02/2020     Lab Results   Component Value Date     04/05/2023    CO2 25 04/05/2023       Assessment: LEFT LUMBAR 3-4 AND LEFT LUMBAR 4-5 TRANSFORAMINAL LUMBAR INTERBODY FUSION WITH LAMINECTOMY USING STEALTH NAVIGATION @    Plan:   Continue PT/OT  Weightbearing status:  WBAT  No bending, twisting, or lifting more than 10 pounds for 6 weeks   Discontinue drain today   Anticoagulation: SCDs, milagro stockings and early ambulation.  Discharge planning: Home today     Report completed by:  LINDSEY CHAMPAGNE PA-C  Date: 4/30/2023  Time: 8:00 AM  "

## 2023-04-30 NOTE — PLAN OF CARE
Patient vital signs are at baseline: Yes  Patient able to ambulate as they were prior to admission or with assist devices provided by therapies during their stay:  Yes  Patient MUST void prior to discharge:  Yes  Patient able to tolerate oral intake:  Yes  Pain has adequate pain control using Oral analgesics:  Yes  Does patient have an identified :  Yes  Has goal D/C date and time been discussed with patient:  Yes     Pt discharged home with nephew as transport. Discharge instructions given.

## 2023-05-02 NOTE — DISCHARGE SUMMARY
ORTHOPEDIC HOSPITAL DISCHARGE SUMMARY    Patient Name: Wm Gaines  YOB: 1960 Age: 62 year old  Medical Record Number: [id number]  Primary Physician: Roz López  Admission Date: 4/27/2023  Discharge Date: 4/30/2023 12:53 PM      PRINCIPAL DIAGNOSIS CAUSING ADMISSION: Spinal stenosis, lumbar [M48.061]  Spondylolisthesis [M43.10]  Status post lumbar surgery [Z98.890]    Patient Active Hospital Problem List:    DISCHARGE MEDICATIONS  ASA for DVT prophylaxis    BRIEF HOSPITAL COURSE: This 62 year old male underwent LEFT LUMBAR 3-4 AND LEFT LUMBAR 4-5 TRANSFORAMINAL LUMBAR INTERBODY FUSION WITH LAMINECTOMY USING STEALTH NAVIGATION     PROCEDURES PERFORMED: LEFT LUMBAR 3-4 AND LEFT LUMBAR 4-5 TRANSFORAMINAL LUMBAR INTERBODY FUSION WITH LAMINECTOMY USING STEALTH NAVIGATION on 4/27/2023    COMPLICATIONS IN HOSPITAL: None    Brief Summary:  Wm Gaines was admitted on 4/27/2023  for Procedure(s):  LEFT LUMBAR 3-4 AND LEFT LUMBAR 4-5 TRANSFORAMINAL LUMBAR INTERBODY FUSION WITH LAMINECTOMY USING STEALTH NAVIGATION    There were no intraoperative or perioperative complications. Post operatively Wm Gaines was progressed to general diet, physical therapy and pain protocols and was subsequently discharged on 4/30/2023 12:53 PM.    Further details as per the medical record.      Discharge instruction and meds were given per the after discharge order set and the patient was to return to clinic in 1-2 weeks for routine followup.        Philip Rico MD, MAMY.  [facility]

## 2023-05-10 ENCOUNTER — TRANSFERRED RECORDS (OUTPATIENT)
Dept: HEALTH INFORMATION MANAGEMENT | Facility: CLINIC | Age: 63
End: 2023-05-10
Payer: COMMERCIAL

## 2023-05-12 ENCOUNTER — DOCUMENTATION ONLY (OUTPATIENT)
Dept: SURGERY | Facility: CLINIC | Age: 63
End: 2023-05-12
Payer: COMMERCIAL

## 2023-05-12 DIAGNOSIS — R26.89 IMPAIRED GAIT AND MOBILITY: Primary | ICD-10-CM

## 2023-05-12 DIAGNOSIS — Z53.9 ERRONEOUS ENCOUNTER--DISREGARD: ICD-10-CM

## 2023-05-17 DIAGNOSIS — Z98.890 STATUS POST LUMBAR SURGERY: ICD-10-CM

## 2023-05-17 NOTE — TELEPHONE ENCOUNTER
Medication Question or Refill        What medication are you calling about (include dose and sig)?: gabapentin (NEURONTIN) 100 MG capsule    Preferred Pharmacy:    [facility] - [place], [place] - [address] [facility] [address]  [address] [facility] [address]  [facility] [address]  Phone: [phone] Fax: [phone]        Controlled Substance Agreement on file:   CSA -- Patient Level:    CSA: None found at the patient level.       Who prescribed the medication?: N/A    Do you need a refill? Yes, pt called in to request a refill on medication as he had back surgery, Please look into this request and advise. Please send to pharmacy if applicable.  Thanks!  When did you use the medication last? N/A    Patient offered an appointment? No    Do you have any questions or concerns?  No      Could we send this information to you in [facility] or would you prefer to receive a phone call?:   Patient would prefer a phone call   Okay to leave a detailed message?: Yes at Home number on file [phone] (home)

## 2023-05-18 RX ORDER — GABAPENTIN 100 MG/1
100 CAPSULE ORAL 3 TIMES DAILY
Qty: 30 CAPSULE | Refills: 0 | Status: SHIPPED | OUTPATIENT
Start: 2023-05-18 | End: 2023-09-18

## 2023-05-22 ENCOUNTER — DOCUMENTATION ONLY (OUTPATIENT)
Facility: CLINIC | Age: 63
End: 2023-05-22
Payer: COMMERCIAL

## 2023-05-22 DIAGNOSIS — R26.89 IMPAIRED GAIT AND MOBILITY: Primary | ICD-10-CM

## 2023-06-14 ENCOUNTER — TRANSFERRED RECORDS (OUTPATIENT)
Dept: HEALTH INFORMATION MANAGEMENT | Facility: CLINIC | Age: 63
End: 2023-06-14
Payer: COMMERCIAL

## 2023-07-26 ENCOUNTER — TRANSFERRED RECORDS (OUTPATIENT)
Dept: HEALTH INFORMATION MANAGEMENT | Facility: CLINIC | Age: 63
End: 2023-07-26
Payer: COMMERCIAL

## 2023-09-25 ENCOUNTER — TRANSFERRED RECORDS (OUTPATIENT)
Dept: HEALTH INFORMATION MANAGEMENT | Facility: CLINIC | Age: 63
End: 2023-09-25
Payer: COMMERCIAL

## 2023-10-09 ENCOUNTER — TELEPHONE (OUTPATIENT)
Dept: FAMILY MEDICINE | Facility: CLINIC | Age: 63
End: 2023-10-09

## 2023-10-09 ENCOUNTER — LAB (OUTPATIENT)
Dept: FAMILY MEDICINE | Facility: CLINIC | Age: 63
End: 2023-10-09
Payer: COMMERCIAL

## 2023-10-09 DIAGNOSIS — Z12.11 COLON CANCER SCREENING: ICD-10-CM

## 2023-10-09 DIAGNOSIS — Z12.11 COLON CANCER SCREENING: Primary | ICD-10-CM

## 2023-10-15 ENCOUNTER — HEALTH MAINTENANCE LETTER (OUTPATIENT)
Age: 63
End: 2023-10-15

## 2023-10-23 LAB — NONINV COLON CA DNA+OCC BLD SCRN STL QL: NEGATIVE

## 2023-10-24 ASSESSMENT — ENCOUNTER SYMPTOMS
HEADACHES: 0
NAUSEA: 0
HEARTBURN: 0
DIARRHEA: 0
CHILLS: 0
PALPITATIONS: 0
COUGH: 0
WEAKNESS: 1
HEMATURIA: 0
DIZZINESS: 0
NERVOUS/ANXIOUS: 0
HEMATOCHEZIA: 0
ARTHRALGIAS: 1
ABDOMINAL PAIN: 0
FEVER: 0
DYSURIA: 0
SHORTNESS OF BREATH: 1
SORE THROAT: 0
PARESTHESIAS: 1
JOINT SWELLING: 0
CONSTIPATION: 0
EYE PAIN: 0
MYALGIAS: 1
FREQUENCY: 1

## 2023-10-25 ENCOUNTER — TRANSFERRED RECORDS (OUTPATIENT)
Dept: HEALTH INFORMATION MANAGEMENT | Facility: CLINIC | Age: 63
End: 2023-10-25
Payer: COMMERCIAL

## 2023-10-27 ENCOUNTER — OFFICE VISIT (OUTPATIENT)
Dept: FAMILY MEDICINE | Facility: CLINIC | Age: 63
End: 2023-10-27
Payer: COMMERCIAL

## 2023-10-27 VITALS
HEIGHT: 73 IN | RESPIRATION RATE: 20 BRPM | DIASTOLIC BLOOD PRESSURE: 74 MMHG | HEART RATE: 102 BPM | BODY MASS INDEX: 29.16 KG/M2 | TEMPERATURE: 98.1 F | OXYGEN SATURATION: 98 % | WEIGHT: 220 LBS | SYSTOLIC BLOOD PRESSURE: 108 MMHG

## 2023-10-27 DIAGNOSIS — M21.371 ACQUIRED RIGHT FOOT DROP: ICD-10-CM

## 2023-10-27 DIAGNOSIS — Z00.00 ADULT GENERAL MEDICAL EXAM: Primary | ICD-10-CM

## 2023-10-27 DIAGNOSIS — Z12.5 SCREENING FOR PROSTATE CANCER: ICD-10-CM

## 2023-10-27 DIAGNOSIS — Z12.11 SCREEN FOR COLON CANCER: ICD-10-CM

## 2023-10-27 DIAGNOSIS — Z83.3 FAMILY HISTORY OF DIABETES MELLITUS: ICD-10-CM

## 2023-10-27 DIAGNOSIS — Z98.890 STATUS POST LUMBAR SURGERY: ICD-10-CM

## 2023-10-27 DIAGNOSIS — M21.372 LEFT FOOT DROP: ICD-10-CM

## 2023-10-27 DIAGNOSIS — M77.8 TENDINITIS OF RIGHT WRIST: ICD-10-CM

## 2023-10-27 LAB
ALBUMIN SERPL BCG-MCNC: 4.5 G/DL (ref 3.5–5.2)
ALP SERPL-CCNC: 130 U/L (ref 40–129)
ALT SERPL W P-5'-P-CCNC: 36 U/L (ref 0–70)
ANION GAP SERPL CALCULATED.3IONS-SCNC: 12 MMOL/L (ref 7–15)
AST SERPL W P-5'-P-CCNC: 35 U/L (ref 0–45)
BILIRUB SERPL-MCNC: 0.3 MG/DL
BUN SERPL-MCNC: 10.1 MG/DL (ref 8–23)
CALCIUM SERPL-MCNC: 9.9 MG/DL (ref 8.8–10.2)
CHLORIDE SERPL-SCNC: 98 MMOL/L (ref 98–107)
CHOLEST SERPL-MCNC: 210 MG/DL
CREAT SERPL-MCNC: 0.59 MG/DL (ref 0.67–1.17)
DEPRECATED HCO3 PLAS-SCNC: 27 MMOL/L (ref 22–29)
EGFRCR SERPLBLD CKD-EPI 2021: >90 ML/MIN/1.73M2
GLUCOSE SERPL-MCNC: 95 MG/DL (ref 70–99)
HBA1C MFR BLD: 5.6 % (ref 0–5.6)
HDLC SERPL-MCNC: 34 MG/DL
LDLC SERPL CALC-MCNC: 153 MG/DL
NONHDLC SERPL-MCNC: 176 MG/DL
POTASSIUM SERPL-SCNC: 4.5 MMOL/L (ref 3.4–5.3)
PROT SERPL-MCNC: 7.7 G/DL (ref 6.4–8.3)
PSA SERPL DL<=0.01 NG/ML-MCNC: 1.04 NG/ML (ref 0–4.5)
SODIUM SERPL-SCNC: 137 MMOL/L (ref 135–145)
TRIGL SERPL-MCNC: 117 MG/DL

## 2023-10-27 PROCEDURE — G0103 PSA SCREENING: HCPCS | Performed by: FAMILY MEDICINE

## 2023-10-27 PROCEDURE — 80061 LIPID PANEL: CPT | Performed by: FAMILY MEDICINE

## 2023-10-27 PROCEDURE — 80053 COMPREHEN METABOLIC PANEL: CPT | Performed by: FAMILY MEDICINE

## 2023-10-27 PROCEDURE — 99396 PREV VISIT EST AGE 40-64: CPT | Performed by: FAMILY MEDICINE

## 2023-10-27 PROCEDURE — 36415 COLL VENOUS BLD VENIPUNCTURE: CPT | Performed by: FAMILY MEDICINE

## 2023-10-27 PROCEDURE — 83036 HEMOGLOBIN GLYCOSYLATED A1C: CPT | Performed by: FAMILY MEDICINE

## 2023-10-27 ASSESSMENT — ENCOUNTER SYMPTOMS
HEADACHES: 0
DIZZINESS: 0
EYE PAIN: 0
FREQUENCY: 1
DIARRHEA: 0
CONSTIPATION: 0
JOINT SWELLING: 0
CHILLS: 0
FEVER: 0
DYSURIA: 0
SHORTNESS OF BREATH: 1
NERVOUS/ANXIOUS: 0
WEAKNESS: 1
HEMATURIA: 0
PALPITATIONS: 0
HEMATOCHEZIA: 0
SORE THROAT: 0
MYALGIAS: 1
PARESTHESIAS: 1
ARTHRALGIAS: 1
ABDOMINAL PAIN: 0
COUGH: 0
HEARTBURN: 0
NAUSEA: 0

## 2023-10-27 NOTE — PROGRESS NOTES
SUBJECTIVE:   CC: Wm is an 63 year old who presents for preventative health visit.       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 -     Rare alcohol, no smoker, no drugs      Healthy Habits:     Getting at least 3 servings of Calcium per day:  Yes    Bi-annual eye exam:  NO    Dental care twice a year:  NO    Sleep apnea or symptoms of sleep apnea:  None    Diet:  Regular (no restrictions)    Frequency of exercise:  None    Taking medications regularly:  Yes    Medication side effects:  None    Additional concerns today:  Yes          Social History     Tobacco Use    Smoking status: Former     Packs/day: 2.00     Years: 42.00     Additional pack years: 0.00     Total pack years: 84.00     Types: Cigarettes     Start date: 1980     Quit date: 10/1/2022     Years since quittin.0    Smokeless tobacco: Never   Substance Use Topics    Alcohol use: Not Currently             10/24/2023     3:52 PM   Alcohol Use   Prescreen: >3 drinks/day or >7 drinks/week? No       Last PSA:   Prostate Specific Antigen Screen   Date Value Ref Range Status   10/27/2023 1.04 0.00 - 4.50 ng/mL Final   03/15/2021 1.1 0.0 - 3.5 ng/mL Final       Reviewed orders with patient. Reviewed health maintenance and updated orders accordingly - Yes  BP Readings from Last 3 Encounters:   10/27/23 108/74   23 130/76   23 114/81    Wt Readings from Last 3 Encounters:   10/27/23 99.8 kg (220 lb)   23 100.7 kg (221 lb 15.7 oz)   23 100.7 kg (222 lb)                  Patient Active Problem List   Diagnosis    Family history of diabetes mellitus    Personal history of tobacco use, presenting hazards to health    Hip pain, right    Left  knee pain    Chronic low back pain    Alcohol use    Primary osteoarthritis of right hip    Primary osteoarthritis of left knee    Primary osteoarthritis of left hip    Primary osteoarthritis of right knee    Status post total right knee replacement    Status post lumbar surgery     Past Surgical History:   Procedure Laterality Date    ARTHROSCOPY SHOULDER ROTATOR CUFF REPAIR Right 2019    FUSION TRANSFORAMINAL LUMBAR INTERBODY, 2 LEVELS, POSTERIOR APPROACH, USING OTS IMAGING GUIDANCE Left 2023    Procedure: LEFT LUMBAR 3-4 AND LEFT LUMBAR 4-5 TRANSFORAMINAL LUMBAR INTERBODY FUSION WITH LAMINECTOMY USING STEALTH NAVIGATION;  Surgeon: Philip Rico MD;  [facility] Main [facility]    JOINT REPLACEMENT Left 2019    TKA    SOFT TISSUE SURGERY  2019    Rotator cuff surgery left shoulder    [facility] TOTAL HIP ARTHROPLASTY Right 2018    Procedure: RIGHT TOTAL HIP ARTHROPLASTY, POSTERIOR APPROACH;  Surgeon: Gerard Evans MD;  Location: [facility] Main OR;  Service: Orthopedics    [facility] TOTAL HIP ARTHROPLASTY Left 10/02/2019    Procedure: LEFT DIRECT ANTERIOR TOTAL HIP ARTHROPLASTY;  Surgeon: Gerard Evans MD;  Location: [facility] Main OR;  Service: Orthopedics    [facility] TOTAL KNEE ARTHROPLASTY Left 2019    Procedure: LEFT TOTAL KNEE ARTHROPLASTY;  Surgeon: Gerard Evans MD;  Location: [facility] Main OR;  Service: Orthopedics    [facility] TOTAL KNEE ARTHROPLASTY Right 2020    Procedure: RIGHT TOTAL KNEE ARTHROPLASTY;  Surgeon: Gerard Evans MD;  Location: [facility] Main OR;  Service: Orthopedics       Social History     Tobacco Use    Smoking status: Former     Packs/day: 2.00     Years: 42.00     Additional pack years: 0.00     Total pack years: 84.00     Types: Cigarettes     Start date: 1980     Quit date: 10/1/2022     Years since quittin.0    Smokeless tobacco: Never   Substance Use Topics    Alcohol use: Not Currently     No family history on file.      Current  Outpatient Medications   Medication Sig Dispense Refill    ACETAMINOPHEN EXTRA STRENGTH 500 MG tablet Take 1 to 2 tablets by mouth three times daily as needed for mild pain. 540 tablet 1    gabapentin (NEURONTIN) 100 MG capsule Take 1 capsule by mouth three times daily. 30 capsule 0    hydrOXYzine (ATARAX) 25 MG tablet Take 1 tablet (25 mg) by mouth every 6 hours as needed for other (adjuvant pain) 30 tablet 0    tiZANidine (ZANAFLEX) 4 MG tablet Take 1 tablet (4 mg) by mouth every 8 hours as needed (muscle spasms or post-op pain) 30 tablet 0     No Known Allergies    Reviewed and updated as needed this visit by clinical staff   Tobacco  Allergies  Meds              Reviewed and updated as needed this visit by Provider                 Past Medical History:   Diagnosis Date    Arthritis       Past Surgical History:   Procedure Laterality Date    ARTHROSCOPY SHOULDER ROTATOR CUFF REPAIR Right 11/01/2019    FUSION TRANSFORAMINAL LUMBAR INTERBODY, 2 LEVELS, POSTERIOR APPROACH, USING OTS IMAGING GUIDANCE Left 4/27/2023    Procedure: LEFT LUMBAR 3-4 AND LEFT LUMBAR 4-5 TRANSFORAMINAL LUMBAR INTERBODY FUSION WITH LAMINECTOMY USING STEALTH NAVIGATION;  Surgeon: Philip Rico MD;  Location: [facility] Main OR    JOINT REPLACEMENT Left 02/06/2019    TKA    SOFT TISSUE SURGERY  11/22/2019    Rotator cuff surgery left shoulder    [facility] TOTAL HIP ARTHROPLASTY Right 09/18/2018    Procedure: RIGHT TOTAL HIP ARTHROPLASTY, POSTERIOR APPROACH;  Surgeon: Gerard Evans MD;  Location: [facility] OR;  Service: Orthopedics    [facility] TOTAL HIP ARTHROPLASTY Left 10/02/2019    Procedure: LEFT DIRECT ANTERIOR TOTAL HIP ARTHROPLASTY;  Surgeon: Gerard Evans MD;  Location: [facility] Main OR;  Service: Orthopedics    [facility] TOTAL KNEE ARTHROPLASTY Left 02/06/2019    Procedure: LEFT TOTAL KNEE ARTHROPLASTY;  Surgeon: Gerard Evans MD;  Location: [facility] Main OR;  Service: Orthopedics    [facility] TOTAL KNEE ARTHROPLASTY Right 11/04/2020  "   Procedure: RIGHT TOTAL KNEE ARTHROPLASTY;  Surgeon: Gerard Evans MD;  Location: [facility] OR;  Service: Orthopedics     OB History   No obstetric history on file.       Review of Systems   Constitutional:  Negative for chills and fever.   HENT:  Negative for congestion, ear pain, hearing loss and sore throat.    Eyes:  Positive for visual disturbance. Negative for pain.   Respiratory:  Positive for shortness of breath. Negative for cough.    Cardiovascular:  Negative for chest pain, palpitations and peripheral edema.   Gastrointestinal:  Negative for abdominal pain, constipation, diarrhea, heartburn, hematochezia and nausea.   Genitourinary:  Positive for frequency and urgency. Negative for dysuria, genital sores, hematuria, impotence and penile discharge.   Musculoskeletal:  Positive for arthralgias and myalgias. Negative for joint swelling.   Skin:  Negative for rash.   Neurological:  Positive for weakness and paresthesias. Negative for dizziness and headaches.   Psychiatric/Behavioral:  Negative for mood changes. The patient is not nervous/anxious.          OBJECTIVE:   /74 (BP Location: Right arm, Patient Position: Sitting, Cuff Size: Adult Large)   Pulse 102   Temp 98.1  F (36.7  C) (Temporal)   Resp 20   Ht 1.854 m (6' 1\")   Wt 99.8 kg (220 lb)   SpO2 98%   BMI 29.03 kg/m      Physical Exam  Vitals reviewed.   Constitutional:       General: He is not in acute distress.     Appearance: Normal appearance.   HENT:      Head: Normocephalic.      Right Ear: Tympanic membrane, ear canal and external ear normal.      Left Ear: Tympanic membrane, ear canal and external ear normal.      Nose: Nose normal.      Mouth/Throat:      Mouth: Mucous membranes are moist.      Pharynx: No posterior oropharyngeal erythema.   Eyes:      Extraocular Movements: Extraocular movements intact.      Conjunctiva/sclera: Conjunctivae normal.      Pupils: Pupils are equal, round, and reactive to light. "   Cardiovascular:      Rate and Rhythm: Normal rate and regular rhythm.      Pulses: Normal pulses.      Heart sounds: Normal heart sounds. No murmur heard.  Pulmonary:      Effort: Pulmonary effort is normal.      Breath sounds: Normal breath sounds.   Abdominal:      Palpations: Abdomen is soft. There is no mass.      Tenderness: There is no abdominal tenderness. There is no guarding or rebound.   Musculoskeletal:         General: No deformity. Normal range of motion.      Cervical back: Normal range of motion and neck supple.   Lymphadenopathy:      Cervical: No cervical adenopathy.   Skin:     General: Skin is warm and dry.      Comments: Lumbar midline surgical wound well healed     Neurological:      General: No focal deficit present.      Mental Status: He is alert and oriented to person, place, and time.      Motor: Weakness (lower extremity - bilateral - weakness) present.      Gait: Gait abnormal.   Psychiatric:         Mood and Affect: Mood normal.         Behavior: Behavior normal.           Diagnostic Test Results:  Labs reviewed in Epic  Results for orders placed or performed in visit on 10/27/23 (from the past 24 hour(s))   PSA, screen   Result Value Ref Range    Prostate Specific Antigen Screen 1.04 0.00 - 4.50 ng/mL    Narrative    This result is obtained using the Roche Elecsys total PSA method on the alfredo e801 immunoassay analyzer. Results obtained with different assay methods or kits cannot be used interchangeably.   Comprehensive metabolic panel (BMP + Alb, Alk Phos, ALT, AST, Total. Bili, TP)   Result Value Ref Range    Sodium 137 135 - 145 mmol/L    Potassium 4.5 3.4 - 5.3 mmol/L    Carbon Dioxide (CO2) 27 22 - 29 mmol/L    Anion Gap 12 7 - 15 mmol/L    Urea Nitrogen 10.1 8.0 - 23.0 mg/dL    Creatinine 0.59 (L) 0.67 - 1.17 mg/dL    GFR Estimate >90 >60 mL/min/1.73m2    Calcium 9.9 8.8 - 10.2 mg/dL    Chloride 98 98 - 107 mmol/L    Glucose 95 70 - 99 mg/dL    Alkaline Phosphatase 130 (H) 40 -  129 U/L    AST 35 0 - 45 U/L    ALT 36 0 - 70 U/L    Protein Total 7.7 6.4 - 8.3 g/dL    Albumin 4.5 3.5 - 5.2 g/dL    Bilirubin Total 0.3 <=1.2 mg/dL   Lipid Profile (Chol, Trig, HDL, LDL calc)   Result Value Ref Range    Cholesterol 210 (H) <200 mg/dL    Triglycerides 117 <150 mg/dL    Direct Measure HDL 34 (L) >=40 mg/dL    LDL Cholesterol Calculated 153 (H) <=100 mg/dL    Non HDL Cholesterol 176 (H) <130 mg/dL    Narrative    Cholesterol  Desirable:  <200 mg/dL    Triglycerides  Normal:  Less than 150 mg/dL  Borderline High:  150-199 mg/dL  High:  200-499 mg/dL  Very High:  Greater than or equal to 500 mg/dL    Direct Measure HDL  Female:  Greater than or equal to 50 mg/dL   Male:  Greater than or equal to 40 mg/dL    LDL Cholesterol  Desirable:  <100mg/dL  Above Desirable:  100-129 mg/dL   Borderline High:  130-159 mg/dL   High:  160-189 mg/dL   Very High:  >= 190 mg/dL    Non HDL Cholesterol  Desirable:  130 mg/dL  Above Desirable:  130-159 mg/dL  Borderline High:  160-189 mg/dL  High:  190-219 mg/dL  Very High:  Greater than or equal to 220 mg/dL   Hemoglobin A1c   Result Value Ref Range    Hemoglobin A1C 5.6 0.0 - 5.6 %       ASSESSMENT/PLAN:   1. Adult general medical exam  This is a 62 yo male here for physical exam - check labs -   - Comprehensive metabolic panel (BMP + Alb, Alk Phos, ALT, AST, Total. Bili, TP); Future  - Lipid Profile (Chol, Trig, HDL, LDL calc); Future  - Comprehensive metabolic panel (BMP + Alb, Alk Phos, ALT, AST, Total. Bili, TP)  - Lipid Profile (Chol, Trig, HDL, LDL calc)    2. Status post lumbar surgery  3. Acquired right foot drop  4. Left foot drop  Patient presented initially with left foot drop - seen by back surgeon for evaluation.  Underwent surgery earlier this year.  Doesn't appreciate any significant improvement.  In the interim , has developed right foot drop.  So now wearing bilateral AFOs (which he purchased from [employer]).  He is scheduled for an injection in the near  "future.      5. Tendinitis of right wrist  Patient ambulates with 2 canes when he is going any considerable distance.  Has developed pain in right wrist from his cane.  Will refer to Ortho - anticipate injection therapy in his risk for pain /    - Orthopedic  Referral; Future    6. Screen for colon cancer  Due for colon cancer screening - discussed - declines     7. Family history of diabetes mellitus  Patient with elevated A1c and family history positive for diabetes; will screen.    - Hemoglobin A1c; Future  - Hemoglobin A1c    8. Screening for prostate cancer  Patient due for prostate cancer screeninig - ordered  - PSA, screen; Future  - PSA, screen      Patient has been advised of split billing requirements and indicates understanding: Yes      COUNSELING:   Reviewed preventive health counseling, as reflected in patient instructions       Regular exercise       Healthy diet/nutrition      BMI:   Estimated body mass index is 29.03 kg/m  as calculated from the following:    Height as of this encounter: 1.854 m (6' 1\").    Weight as of this encounter: 99.8 kg (220 lb).   Weight management plan: Discussed healthy diet and exercise guidelines      He reports that he quit smoking about 12 months ago. His smoking use included cigarettes. He started smoking about 43 years ago. He has a 84.00 pack-year smoking history. He has never used smokeless tobacco.          MARLON TRIVEDI MD  [facility]  Prior to immunization administration, verified patients identity using patient s name and date of birth. Please see Immunization Activity for additional information.     Screening Questionnaire for Adult Immunization    Are you sick today?   No   Do you have allergies to medications, food, a vaccine component or latex?   No   Have you ever had a serious reaction after receiving a vaccination?   No   Do you have a long-term health problem with heart, lung, kidney, or metabolic " disease (e.g., diabetes), asthma, a blood disorder, no spleen, complement component deficiency, a cochlear implant, or a spinal fluid leak?  Are you on long-term aspirin therapy?   No   Do you have cancer, leukemia, HIV/AIDS, or any other immune system problem?   No   Do you have a parent, brother, or sister with an immune system problem?   No   In the past 3 months, have you taken medications that affect  your immune system, such as prednisone, other steroids, or anticancer drugs; drugs for the treatment of rheumatoid arthritis, Crohn s disease, or psoriasis; or have you had radiation treatments?   No   Have you had a seizure, or a brain or other nervous system problem?   No   During the past year, have you received a transfusion of blood or blood    products, or been given immune (gamma) globulin or antiviral drug?   Don't Know   For women: Are you pregnant or is there a chance you could become       pregnant during the next month?   No   Have you received any vaccinations in the past 4 weeks?   No     Immunization questionnaire answers were all negative.      Patient instructed to remain in clinic for 15 minutes afterwards, and to report any adverse reactions.     Screening performed by Izzy Garcia MA on 10/27/2023 at 9:24 AM.

## 2023-11-06 ENCOUNTER — TRANSFERRED RECORDS (OUTPATIENT)
Dept: HEALTH INFORMATION MANAGEMENT | Facility: CLINIC | Age: 63
End: 2023-11-06
Payer: COMMERCIAL

## 2023-12-08 ENCOUNTER — TRANSFERRED RECORDS (OUTPATIENT)
Dept: HEALTH INFORMATION MANAGEMENT | Facility: CLINIC | Age: 63
End: 2023-12-08

## 2023-12-13 ENCOUNTER — MEDICAL CORRESPONDENCE (OUTPATIENT)
Dept: HEALTH INFORMATION MANAGEMENT | Facility: CLINIC | Age: 63
End: 2023-12-13
Payer: COMMERCIAL

## 2023-12-13 ENCOUNTER — TRANSFERRED RECORDS (OUTPATIENT)
Dept: HEALTH INFORMATION MANAGEMENT | Facility: CLINIC | Age: 63
End: 2023-12-13
Payer: COMMERCIAL

## 2023-12-13 ENCOUNTER — TELEPHONE (OUTPATIENT)
Dept: FAMILY MEDICINE | Facility: CLINIC | Age: 63
End: 2023-12-13
Payer: COMMERCIAL

## 2023-12-13 DIAGNOSIS — G58.7: Primary | ICD-10-CM

## 2023-12-13 DIAGNOSIS — I77.6: Primary | ICD-10-CM

## 2023-12-13 NOTE — TELEPHONE ENCOUNTER
Received call from patient requesting lab appointment for Friday 12/15.    He saw his orthopedist this morning, had an EMG done, and reports they want him to have some follow up labs done in his PCP clinic. [facility] will be faxing this request to clinic today.     We are unable to schedule this lab appointment or order these labs until we receive this. Please follow up on this and send request to Dr. Hamilton/schedule pt for lab appt once received. Thanks!

## 2023-12-13 NOTE — TELEPHONE ENCOUNTER
I have not been able to reach [facility] - I have a note from his visit (to [facility]) but I don't see lab requests.  I am very happy to write for labs, but need to know what they want.  Can someone please follow through with this ?  It looks like patient was seen by Dr. Rico on 12/13/2023 at the [facility]

## 2023-12-13 NOTE — TELEPHONE ENCOUNTER
Sorry - I found them in my outbox!!  I'll send orders.      Please let patient know the referral and lab orders are in the system.

## 2023-12-14 ENCOUNTER — TELEPHONE (OUTPATIENT)
Dept: NEUROLOGY | Facility: CLINIC | Age: 63
End: 2023-12-14
Payer: COMMERCIAL

## 2023-12-14 NOTE — TELEPHONE ENCOUNTER
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

## 2023-12-15 ENCOUNTER — LAB (OUTPATIENT)
Dept: LAB | Facility: CLINIC | Age: 63
End: 2023-12-15
Payer: COMMERCIAL

## 2023-12-15 DIAGNOSIS — G58.7: ICD-10-CM

## 2023-12-15 DIAGNOSIS — I77.6: ICD-10-CM

## 2023-12-15 LAB
ALBUMIN SERPL BCG-MCNC: 4.6 G/DL (ref 3.5–5.2)
ALP SERPL-CCNC: 117 U/L (ref 40–150)
ALT SERPL W P-5'-P-CCNC: 38 U/L (ref 0–70)
ANION GAP SERPL CALCULATED.3IONS-SCNC: 10 MMOL/L (ref 7–15)
AST SERPL W P-5'-P-CCNC: 37 U/L (ref 0–45)
BASOPHILS # BLD AUTO: 0 10E3/UL (ref 0–0.2)
BASOPHILS NFR BLD AUTO: 1 %
BILIRUB SERPL-MCNC: 0.4 MG/DL
BUN SERPL-MCNC: 9.7 MG/DL (ref 8–23)
CALCIUM SERPL-MCNC: 9.2 MG/DL (ref 8.8–10.2)
CHLORIDE SERPL-SCNC: 100 MMOL/L (ref 98–107)
CREAT SERPL-MCNC: 0.59 MG/DL (ref 0.67–1.17)
CRP SERPL-MCNC: 3.11 MG/L
DEPRECATED HCO3 PLAS-SCNC: 29 MMOL/L (ref 22–29)
EGFRCR SERPLBLD CKD-EPI 2021: >90 ML/MIN/1.73M2
EOSINOPHIL # BLD AUTO: 0.2 10E3/UL (ref 0–0.7)
EOSINOPHIL NFR BLD AUTO: 4 %
ERYTHROCYTE [DISTWIDTH] IN BLOOD BY AUTOMATED COUNT: 14.1 % (ref 10–15)
ERYTHROCYTE [SEDIMENTATION RATE] IN BLOOD BY WESTERGREN METHOD: 14 MM/HR (ref 0–20)
GLUCOSE SERPL-MCNC: 93 MG/DL (ref 70–99)
HCT VFR BLD AUTO: 42.2 % (ref 40–53)
HGB BLD-MCNC: 13.9 G/DL (ref 13.3–17.7)
IMM GRANULOCYTES # BLD: 0 10E3/UL
IMM GRANULOCYTES NFR BLD: 0 %
LYMPHOCYTES # BLD AUTO: 1.8 10E3/UL (ref 0.8–5.3)
LYMPHOCYTES NFR BLD AUTO: 34 %
MCH RBC QN AUTO: 31 PG (ref 26.5–33)
MCHC RBC AUTO-ENTMCNC: 32.9 G/DL (ref 31.5–36.5)
MCV RBC AUTO: 94 FL (ref 78–100)
MONOCYTES # BLD AUTO: 0.4 10E3/UL (ref 0–1.3)
MONOCYTES NFR BLD AUTO: 7 %
NEUTROPHILS # BLD AUTO: 2.8 10E3/UL (ref 1.6–8.3)
NEUTROPHILS NFR BLD AUTO: 54 %
PLATELET # BLD AUTO: 240 10E3/UL (ref 150–450)
POTASSIUM SERPL-SCNC: 4.3 MMOL/L (ref 3.4–5.3)
PROT SERPL-MCNC: 7.3 G/DL (ref 6.4–8.3)
RBC # BLD AUTO: 4.49 10E6/UL (ref 4.4–5.9)
SODIUM SERPL-SCNC: 139 MMOL/L (ref 135–145)
WBC # BLD AUTO: 5.2 10E3/UL (ref 4–11)

## 2023-12-15 PROCEDURE — 36415 COLL VENOUS BLD VENIPUNCTURE: CPT

## 2023-12-15 PROCEDURE — 85025 COMPLETE CBC W/AUTO DIFF WBC: CPT

## 2023-12-15 PROCEDURE — 86140 C-REACTIVE PROTEIN: CPT

## 2023-12-15 PROCEDURE — 80053 COMPREHEN METABOLIC PANEL: CPT

## 2023-12-15 PROCEDURE — 86038 ANTINUCLEAR ANTIBODIES: CPT

## 2023-12-15 PROCEDURE — 85652 RBC SED RATE AUTOMATED: CPT

## 2023-12-18 LAB — ANA SER QL IF: NEGATIVE

## 2024-01-19 ENCOUNTER — MYC MEDICAL ADVICE (OUTPATIENT)
Dept: FAMILY MEDICINE | Facility: CLINIC | Age: 64
End: 2024-01-19
Payer: COMMERCIAL

## 2024-01-23 NOTE — COMMUNITY RESOURCES LIST (ENGLISH)
01/23/2024    [employer]  N/A  For questions about this resource list or additional care needs, please contact your primary care clinic or care manager.  Phone: [phone]   Email: N/A   Address: [address] [place] [address]   Hours: N/A        Financial Stability       Utility payment assistance  1  Neighborhood [facility] \[facility] [place] [facility]\"" Stability - Utility Payment Assistance Distance: 0.19 miles      Phone/Virtual   [address] [place] [address]  Language: English, Hmong, Azeri  Hours: Mon - Fri Appt. Only  Fees: Free   Phone: (127) 154-4381 Website: [email or web address]/     2  [facility] Distance: 1.31 miles      In-Person, Phone/Virtual   [address] [place] [address]  Language: English  Hours: Mon - Fri 8:00 AM - 4:30 PM  Fees: Free   Phone: (651) 313-5018 Email: maksim@[facility]. Website: https://[email or web address]-government/departments/health-and-wellness          Important Numbers & Websites       Emergency Services   911  City Services   311  Poison Control   (237) 498-6045  Suicide Prevention Lifeline   (316) 194-1059 (TALK)  Child Abuse Hotline   (208) 466-1050 (4-A-Child)  Sexual Assault Hotline   (543) 715-2612 (HOPE)  National Runaway Safeline   (209) 952-6067 (RUNAWAY)  All-Options Talkline   (602) 199-3388  Substance Abuse Referral   (189) 645-4300 (HELP)

## 2024-01-24 ENCOUNTER — OFFICE VISIT (OUTPATIENT)
Dept: FAMILY MEDICINE | Facility: CLINIC | Age: 64
End: 2024-01-24
Payer: COMMERCIAL

## 2024-01-24 VITALS
HEART RATE: 90 BPM | DIASTOLIC BLOOD PRESSURE: 82 MMHG | TEMPERATURE: 98.6 F | RESPIRATION RATE: 20 BRPM | BODY MASS INDEX: 30.09 KG/M2 | OXYGEN SATURATION: 95 % | HEIGHT: 73 IN | WEIGHT: 227 LBS | SYSTOLIC BLOOD PRESSURE: 114 MMHG

## 2024-01-24 DIAGNOSIS — G58.7: Primary | ICD-10-CM

## 2024-01-24 DIAGNOSIS — I77.6: Primary | ICD-10-CM

## 2024-01-24 PROCEDURE — 99213 OFFICE O/P EST LOW 20 MIN: CPT | Performed by: FAMILY MEDICINE

## 2024-01-24 NOTE — PROGRESS NOTES
"  1. 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  He is awaiting further evaluation, would like to see a rheumatologist at [facility] who has interest in this particular diagnosis.  Access to rheumatology in our system has been recently limited with long waits.  Will try to access his choice.  Reviewed his recent workups and scans as well as history.    - Adult Rheumatology  Referral; Future      Subjective   Wm is a 63 year old, presenting for the following health issues:  Foot Problems (Complains of skin feeling tight on both feet, had MRI done and EMG and was referred to Neuro), Night Sweats (Has been having night sweats and low grade fevers, nothing for the past 2-3 days), and Rash (Started as an Itchy Rash on both legs and arms, no longer itching but still there)        1/24/2024    11:13 AM   Additional Questions   Roomed by Maura     Was diagnosed with mononeuritis/vasculitis  Got labs  Then drank a bottle of tequila    Had scheduled appointment with neurology - got one in April -     Lost his voice, \"flu symptoms\"  A few days later, had itchy rash   Had some Magnesium, ASA (thin the blood), amanda berry supplement (supposed to vasodilate), other concoction (with cayenne, etc)  Took Niacin - got flush - then got rash -   Had super itchy rash - x 2 days, then better x 3-4 days - still just slightly itchy rash - still dry     Patient thinks he has Velez's disease - nicotine/tobacco -   Was still smoking -   Didn't take Chantix previously because it blocks his nose up - now, ust takes it in morning and earlier in evening - doesn't effect him as badly   This is day #4 without nicotine  Tried \"fake\" cigarettes (used Taro leaf cigarettes )  Quit coffee 3 weeks ago - knew caffeine would \"enrage\" the cigarette   Also has given up a lot of dairy     Since September -   Thought L5-S1 -was cause of irght foot issues  Had cortisone injection     Had " "left drop foot - due to lower back  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   Went in September 2022 to see about that - is using brace on left (got that in January 2023)-   Thought there was nothing to do about it     Right sided drop foot started August/September   Was having minor stuff in back   Has progressed with tightness - minimizing mobility     Had EMG in December - 12/12 - thought it was mononeuritis multiplex with vasculitis     Discussed lung cancer screening - \"I don't care about that\" -       History of Present Illness       Reason for visit:  Various symptoms  Symptom onset:  3-4 weeks ago  Symptoms include:  Tightness in feet mild sweats  Symptom intensity:  Mild  Symptom progression:  Staying the same  Had these symptoms before:  No    He eats 0-1 servings of fruits and vegetables daily.He consumes 5 sweetened beverage(s) daily.He exercises with enough effort to increase his heart rate 9 or less minutes per day.  He exercises with enough effort to increase his heart rate 3 or less days per week.   He is taking medications regularly.       Review of Systems   Constitutional: Negative.  Negative for chills and fever.   HENT:  Negative for ear pain and sore throat.    Eyes:  Negative for pain and visual disturbance.   Respiratory:  Negative for cough and shortness of breath.    Cardiovascular:  Negative for chest pain and palpitations.   Gastrointestinal:  Negative for abdominal pain and vomiting.   Genitourinary:  Negative for dysuria and hematuria.   Musculoskeletal:  Negative for arthralgias and back pain.   Skin:  Positive for rash. Negative for color change.   Neurological:  Positive for weakness (bilateral foot drop). Negative for seizures and syncope.   All other systems reviewed and are negative.          Objective    /82 (BP Location: Left arm, Patient Position: Sitting, Cuff Size: Adult Large)   Pulse 90   Temp 98.6  F (37  C) (Oral)   Resp 20   Ht 1.85 m (6' 0.84\") "   Wt 103 kg (227 lb)   SpO2 95%   BMI 30.08 kg/m    Body mass index is 30.08 kg/m .  Physical Exam  Vitals reviewed.   Constitutional:       General: He is not in acute distress.     Appearance: Normal appearance.   HENT:      Head: Normocephalic.      Right Ear: Tympanic membrane, ear canal and external ear normal.      Left Ear: Tympanic membrane, ear canal and external ear normal.      Nose: Nose normal.      Mouth/Throat:      Mouth: Mucous membranes are moist.      Pharynx: No posterior oropharyngeal erythema.   Eyes:      Extraocular Movements: Extraocular movements intact.      Conjunctiva/sclera: Conjunctivae normal.      Pupils: Pupils are equal, round, and reactive to light.   Cardiovascular:      Rate and Rhythm: Normal rate and regular rhythm.      Pulses: Normal pulses.      Heart sounds: Normal heart sounds. No murmur heard.  Pulmonary:      Effort: Pulmonary effort is normal.      Breath sounds: Normal breath sounds.   Abdominal:      Palpations: Abdomen is soft. There is no mass.      Tenderness: There is no abdominal tenderness. There is no guarding or rebound.   Musculoskeletal:         General: No deformity. Normal range of motion.      Cervical back: Normal range of motion and neck supple.   Lymphadenopathy:      Cervical: No cervical adenopathy.   Skin:     General: Skin is warm and dry.   Neurological:      General: No focal deficit present.      Mental Status: He is alert and oriented to person, place, and time.      Motor: Weakness (bilateral foot drop) present.   Psychiatric:         Mood and Affect: Mood normal.         Behavior: Behavior normal.            No results found for any visits on 01/24/24.        Signed Electronically by: MARLON TRIVEDI MD      Prior to immunization administration, verified patients identity using patient s name and date of birth. Please see Immunization Activity for additional information.     Screening Questionnaire for Adult  Immunization    Are you sick today?   Don't Know   Do you have allergies to medications, food, a vaccine component or latex?   No   Have you ever had a serious reaction after receiving a vaccination?   No   Do you have a long-term health problem with heart, lung, kidney, or metabolic disease (e.g., diabetes), asthma, a blood disorder, no spleen, complement component deficiency, a cochlear implant, or a spinal fluid leak?  Are you on long-term aspirin therapy?   No   Do you have cancer, leukemia, HIV/AIDS, or any other immune system problem?   No   Do you have a parent, brother, or sister with an immune system problem?   No   In the past 3 months, have you taken medications that affect  your immune system, such as prednisone, other steroids, or anticancer drugs; drugs for the treatment of rheumatoid arthritis, Crohn s disease, or psoriasis; or have you had radiation treatments?   Yes   Have you had a seizure, or a brain or other nervous system problem?   No   During the past year, have you received a transfusion of blood or blood    products, or been given immune (gamma) globulin or antiviral drug?   Don't Know   For women: Are you pregnant or is there a chance you could become       pregnant during the next month?   No   Have you received any vaccinations in the past 4 weeks?   No     Immunization questionnaire was positive for at least one answer.  Notified Dr. Hamilton.      Patient instructed to remain in clinic for 15 minutes afterwards, and to report any adverse reactions.     Screening performed by Maura Ga CMA on 1/24/2024 at 11:17 AM.

## 2024-01-28 ASSESSMENT — ENCOUNTER SYMPTOMS
EYE PAIN: 0
CHILLS: 0
ARTHRALGIAS: 0
FEVER: 0
SORE THROAT: 0
SEIZURES: 0
PALPITATIONS: 0
BACK PAIN: 0
SHORTNESS OF BREATH: 0
COLOR CHANGE: 0
CONSTITUTIONAL NEGATIVE: 1
DYSURIA: 0
VOMITING: 0
ABDOMINAL PAIN: 0
COUGH: 0
HEMATURIA: 0
WEAKNESS: 1

## 2024-01-31 ENCOUNTER — TELEPHONE (OUTPATIENT)
Dept: FAMILY MEDICINE | Facility: CLINIC | Age: 64
End: 2024-01-31
Payer: COMMERCIAL

## 2024-01-31 NOTE — TELEPHONE ENCOUNTER
Pt calling for refaxing of orders.  Referred to [facility] on 1/24/24 for rheumatology/vasculitis  Pt called to schedule last week and [facility] entered wrong birthdate   Called to schedule today and was informed referral not received, possibly related to misinformation. Birthdate corrected.    Please refax referral to [phone].    Fern MALLOY RN  [facility]

## 2024-01-31 NOTE — TELEPHONE ENCOUNTER
Rheumatology referral faxed to [facility] [phone] on 1/31/2024 at 10:35am. Patient was notified that order was faxed. Patient verbalized understanding. Completing task.

## 2024-02-15 ENCOUNTER — TELEPHONE (OUTPATIENT)
Dept: FAMILY MEDICINE | Facility: CLINIC | Age: 64
End: 2024-02-15
Payer: COMMERCIAL

## 2024-02-15 DIAGNOSIS — G58.7: Primary | ICD-10-CM

## 2024-02-15 DIAGNOSIS — I77.6: Primary | ICD-10-CM

## 2024-02-15 NOTE — TELEPHONE ENCOUNTER
Dr Hamilton,    Pt is requesting for neurology referral to [facility] on [address] as he was not able to get in with [facility] until April. Pt has checked and [facility] is in network and have availability sooner. Please see pended referral and approve if agree.       Ok to send message via [email or web address] per pt if approved.    Lexus MALLOY RN  [facility]

## 2024-03-27 NOTE — TELEPHONE ENCOUNTER
Order/Referral Request    Who is requesting: Pt    Orders being requested: Cologuard kit    Reason service is needed/diagnosis: NA    When are orders needed by: ASAP    Has this been discussed with Provider: No    Does patient have a preference on a Group/Provider/Facility? No    Does patient have an appointment scheduled?: Yes: 10/27/2023    Where to send orders: N/A    Could we send this information to you in [facility] or would you prefer to receive a phone call?:   Patient would prefer a phone call   Okay to leave a detailed message?: Yes at Home number on file [phone] (home)   
Order sent   
[FreeTextEntry2] : B/L knee pain

## 2024-04-02 ENCOUNTER — OFFICE VISIT (OUTPATIENT)
Dept: FAMILY MEDICINE | Facility: CLINIC | Age: 64
End: 2024-04-02
Payer: COMMERCIAL

## 2024-04-02 VITALS
BODY MASS INDEX: 38.17 KG/M2 | TEMPERATURE: 97.7 F | OXYGEN SATURATION: 97 % | HEART RATE: 91 BPM | RESPIRATION RATE: 20 BRPM | DIASTOLIC BLOOD PRESSURE: 87 MMHG | SYSTOLIC BLOOD PRESSURE: 129 MMHG | WEIGHT: 288 LBS

## 2024-04-02 DIAGNOSIS — G89.29 CHRONIC LOW BACK PAIN, UNSPECIFIED BACK PAIN LATERALITY, UNSPECIFIED WHETHER SCIATICA PRESENT: Primary | ICD-10-CM

## 2024-04-02 DIAGNOSIS — M54.50 CHRONIC LOW BACK PAIN, UNSPECIFIED BACK PAIN LATERALITY, UNSPECIFIED WHETHER SCIATICA PRESENT: Primary | ICD-10-CM

## 2024-04-02 DIAGNOSIS — M21.371 ACQUIRED RIGHT FOOT DROP: ICD-10-CM

## 2024-04-02 DIAGNOSIS — M21.372 LEFT FOOT DROP: ICD-10-CM

## 2024-04-02 PROCEDURE — 99213 OFFICE O/P EST LOW 20 MIN: CPT | Performed by: FAMILY MEDICINE

## 2024-04-02 ASSESSMENT — ENCOUNTER SYMPTOMS: BACK PAIN: 1

## 2024-04-02 NOTE — PROGRESS NOTES
1. Chronic low back pain, unspecified back pain laterality, unspecified whether sciatica present  2. Acquired right foot drop  3. Left foot drop  This is a 62 yo male with chronic low back pain - had left foot drop; then had surgery, developed right foot drop.  Was seen by Ortho and told he had mononeuritis multiplex.  But - seen by rheumatology and told it was NOT mononeuritis multiplex.  He is now confused about diagnosis and doesn't understand what he should be doing.  Does have neuro evaluation scheduled in next week - I would defer to this evaluation/workup.  No other changes today.        Zachery Burk is a 63 year old, presenting for the following health issues:  Back Pain (Lower back  pain when try sit up on and off but more than usual wanted to know what to do?) and Medication Request (Pt would like to start back  ibuprofen  and )        4/2/2024    10:15 AM   Additional Questions   Roomed by Risa     Having pain in back -   Trying to straighten up in lower spine - feels odd, pain  Doesn't want to go back to [place] -   As he lives with this - didn't do L5-S1 joint -   Right foot/toes/ankle - all affected -   May be a rare autoimmune disease that set in after surgery -?[place]     Has neurology appointment on 4/16/2024  Dr. Alatorre -     Did see rheumatology at [facility] - went to see her - couldn't figure out why he was told he had vasculitis -   She resolved that diagnosis - because there was no testing done -     Still having pain - more pain than previous   Feet are getting worse -   Surgery 4/27/2023 - lumbar fusion / laminectomy L3-4, L4-5  Symptoms preop:  saw PA in September, then back in March - had tightness in gluts and hamstrings - no back pain - was affecting his balance; did an MRI - went out for MRI ([place]) ; then, met with team's NP and told him that usually would be conservative, but recommended urgent surgery.  Ended up with surgeon - and did surgery    Now, has back  "pain, and nerve damage in both feet,  Had drop foot before the surgery (for > 2 years) - didn't need a brace at that time, but did by the time he got surgery    Now, numbness of right foot - since surgery -   (Had bilateral hips and knees done before the back stuff) - and had left foot drop but didn't need  braces    Hasn't seen neurology yet -   Has appointment 4/16/2024    New developments - back pain - not excruciating, just pain; hard to stand up   Not unbearable, but aware of a problem -   Wears AFO on left foot -   Right side still able to manage and manipulate    Using a brace -   Doesn't like to take Tylenol or Ibuprofen - helps, but next day is worse   If doesn't take it, it's \"medium\"        History of Present Illness       Back Pain:  He presents for follow up of back pain. Patient's back pain is a new problem.    Original cause of back pain: other  First noticed back pain: 1-4 weeks ago  Patient feels back pain: comes and goesLocation of back pain:  Right lower back and left lower back  Description of back pain: burning and dull ache  Back pain spreads: nowhere    Since patient first noticed back pain, pain is: always present, but gets better and worse  Does back pain interfere with his job:  No  On a scale of 1-10 (10 being the worst), patient describes pain as:  2  What makes back pain worse: bending and other   Acupuncture: not tried  Acetaminophen: helpful  Activity or exercise: not tried  Chiropractor:  Not tried  Cold: helpful  Heat: helpful  Massage: not tried  Muscle relaxants: not tried  NSAIDS: helpful  Opioids: not tried  Physical Therapy: not tried  Rest: helpful  Steroid Injection: not tried  Stretching: not tried  Surgery: not tried  TENS unit: not tried  Topical pain relievers: not tried  Other healthcare providers patient is seeing for back pain: Other    He eats 0-1 servings of fruits and vegetables daily.He consumes 4 sweetened beverage(s) daily.He exercises with enough effort to " increase his heart rate 10 to 19 minutes per day.  He exercises with enough effort to increase his heart rate 3 or less days per week.   He is taking medications regularly.       Review of Systems   Constitutional:  Negative for chills and fever.   HENT:  Negative for ear pain and sore throat.    Eyes:  Negative for pain and visual disturbance.   Respiratory:  Negative for cough and shortness of breath.    Cardiovascular:  Negative for chest pain and palpitations.   Gastrointestinal:  Negative for abdominal pain and vomiting.   Genitourinary:  Negative for dysuria and hematuria.   Musculoskeletal:  Positive for back pain. Negative for arthralgias.   Skin:  Negative for color change and rash.   Neurological:  Negative for seizures and syncope.        Bilateral foot drop     All other systems reviewed and are negative.          Objective    /87 (BP Location: Left arm, Patient Position: Sitting, Cuff Size: Adult Regular)   Pulse 91   Temp 97.7  F (36.5  C) (Temporal)   Resp 20   Wt 130.6 kg (288 lb)   SpO2 97%   BMI 38.17 kg/m    Body mass index is 38.17 kg/m .  Physical Exam  Vitals reviewed.   Constitutional:       General: He is not in acute distress.     Appearance: Normal appearance.   HENT:      Head: Normocephalic.      Right Ear: Tympanic membrane, ear canal and external ear normal.      Left Ear: Tympanic membrane, ear canal and external ear normal.      Nose: Nose normal.      Mouth/Throat:      Mouth: Mucous membranes are moist.      Pharynx: No posterior oropharyngeal erythema.   Eyes:      Extraocular Movements: Extraocular movements intact.      Conjunctiva/sclera: Conjunctivae normal.      Pupils: Pupils are equal, round, and reactive to light.   Cardiovascular:      Rate and Rhythm: Normal rate and regular rhythm.      Pulses: Normal pulses.      Heart sounds: Normal heart sounds. No murmur heard.  Pulmonary:      Effort: Pulmonary effort is normal.      Breath sounds: Normal breath sounds.    Abdominal:      Palpations: Abdomen is soft. There is no mass.      Tenderness: There is no abdominal tenderness. There is no guarding or rebound.   Musculoskeletal:         General: No deformity. Normal range of motion.      Cervical back: Normal range of motion and neck supple.   Lymphadenopathy:      Cervical: No cervical adenopathy.   Skin:     General: Skin is warm and dry.   Neurological:      General: No focal deficit present.      Mental Status: He is alert and oriented to person, place, and time.      Gait: Gait abnormal (bilateral foot drop - using AFO left side).   Psychiatric:      Comments: Anxious/agitated            No results found for any visits on 04/02/24.      Prior to immunization administration, verified patients identity using patient s name and date of birth. Please see Immunization Activity for additional information.     Screening Questionnaire for Adult Immunization    Are you sick today?   No   Do you have allergies to medications, food, a vaccine component or latex?   No   Have you ever had a serious reaction after receiving a vaccination?   No   Do you have a long-term health problem with heart, lung, kidney, or metabolic disease (e.g., diabetes), asthma, a blood disorder, no spleen, complement component deficiency, a cochlear implant, or a spinal fluid leak?  Are you on long-term aspirin therapy?   No   Do you have cancer, leukemia, HIV/AIDS, or any other immune system problem?   Don't Know   Do you have a parent, brother, or sister with an immune system problem?   No   In the past 3 months, have you taken medications that affect  your immune system, such as prednisone, other steroids, or anticancer drugs; drugs for the treatment of rheumatoid arthritis, Crohn s disease, or psoriasis; or have you had radiation treatments?   No   Have you had a seizure, or a brain or other nervous system problem?   No   During the past year, have you received a transfusion of blood or blood     products, or been given immune (gamma) globulin or antiviral drug?   No   For women: Are you pregnant or is there a chance you could become       pregnant during the next month?   No   Have you received any vaccinations in the past 4 weeks?   No     Immunization questionnaire answers were all negative.      Patient instructed to remain in clinic for 15 minutes afterwards, and to report any adverse reactions.     Screening performed by Risa Cuellar on 4/2/2024 at 10:19 AM.   Signed Electronically by: MARLON TRIVEDI MD

## 2024-04-05 ENCOUNTER — TELEPHONE (OUTPATIENT)
Dept: FAMILY MEDICINE | Facility: CLINIC | Age: 64
End: 2024-04-05
Payer: COMMERCIAL

## 2024-04-05 DIAGNOSIS — M54.16 LUMBAR RADICULOPATHY: Primary | ICD-10-CM

## 2024-04-05 RX ORDER — GABAPENTIN 100 MG/1
200 CAPSULE ORAL 3 TIMES DAILY
Qty: 180 CAPSULE | Refills: 3 | Status: SHIPPED | OUTPATIENT
Start: 2024-04-05 | End: 2024-07-01

## 2024-04-05 RX ORDER — IBUPROFEN 800 MG/1
800 TABLET, FILM COATED ORAL EVERY 8 HOURS PRN
Qty: 100 TABLET | Refills: 1 | Status: SHIPPED | OUTPATIENT
Start: 2024-04-05 | End: 2024-07-10

## 2024-04-05 NOTE — TELEPHONE ENCOUNTER
From: Dakota Sharp  To: Jeanette Pruitt  Sent: 12/18/2023 10:28 AM CST  Subject: Behavior issue    Good morning,     I'm writing because I have noticed that Dakota's behavior has been uncontrollable. He had behavior issues when he was younger when he got mad he would not know how to handle himself and would throw things and hit. This has recently spiked again to the point where I'm concerned there may be an issue but not sure how to address it. He gets very angry then cries, shakes, hits and throws. It's mainly between his brother and or myself when he doesn't agree with what he is being told. Is this a behavior health issue or something else or just being a brat??   Patient seen recently and is requesting ibuprofen and gabapentin refills.  Requesting gabapentin dosage as noted. Orders pended for review.    Also has had low left back pain x 24 hours and may be requesting a referral to ortho. Would like to defer until he sees neurologist on 16 Apr 2024.

## 2024-04-07 ASSESSMENT — ENCOUNTER SYMPTOMS
HEMATURIA: 0
SHORTNESS OF BREATH: 0
EYE PAIN: 0
SORE THROAT: 0
DYSURIA: 0
COUGH: 0
PALPITATIONS: 0
VOMITING: 0
ARTHRALGIAS: 0
CHILLS: 0
FEVER: 0
ABDOMINAL PAIN: 0
COLOR CHANGE: 0
SEIZURES: 0

## 2024-04-09 ENCOUNTER — TELEPHONE (OUTPATIENT)
Dept: FAMILY MEDICINE | Facility: CLINIC | Age: 64
End: 2024-04-09
Payer: COMMERCIAL

## 2024-04-09 DIAGNOSIS — M21.372 LEFT FOOT DROP: ICD-10-CM

## 2024-04-09 DIAGNOSIS — M54.50 CHRONIC LOW BACK PAIN, UNSPECIFIED BACK PAIN LATERALITY, UNSPECIFIED WHETHER SCIATICA PRESENT: ICD-10-CM

## 2024-04-09 DIAGNOSIS — G89.29 CHRONIC LOW BACK PAIN, UNSPECIFIED BACK PAIN LATERALITY, UNSPECIFIED WHETHER SCIATICA PRESENT: ICD-10-CM

## 2024-04-09 DIAGNOSIS — M21.371 ACQUIRED RIGHT FOOT DROP: ICD-10-CM

## 2024-04-09 DIAGNOSIS — M54.16 LUMBAR RADICULOPATHY: Primary | ICD-10-CM

## 2024-04-09 NOTE — TELEPHONE ENCOUNTER
Order/Referral Request    Who is requesting: patient    Orders being requested: Orthopedic referral    Reason service is needed/diagnosis: low back pain    When are orders needed by: Pt stated not urgent    Has this been discussed with Provider: Yes    Does patient have a preference on a Group/Provider/Facility?     Does patient have an appointment scheduled?: No    Where to send orders: Place orders within Epic    Could we send this information to you in [facility] or would you prefer to receive a phone call?:   Patient would prefer a phone call   Okay to leave a detailed message?: Yes at Cell number on file:    Telephone Information:   Mobile [phone]

## 2024-04-11 NOTE — TELEPHONE ENCOUNTER
In our system, any back pain referral goes through [facility], not ortho.  I've sent referral.

## 2024-04-16 ENCOUNTER — OFFICE VISIT (OUTPATIENT)
Dept: NEUROLOGY | Facility: CLINIC | Age: 64
End: 2024-04-16
Attending: FAMILY MEDICINE
Payer: COMMERCIAL

## 2024-04-16 VITALS
HEART RATE: 100 BPM | BODY MASS INDEX: 30.08 KG/M2 | RESPIRATION RATE: 18 BRPM | SYSTOLIC BLOOD PRESSURE: 128 MMHG | DIASTOLIC BLOOD PRESSURE: 68 MMHG | WEIGHT: 227 LBS

## 2024-04-16 DIAGNOSIS — M21.372 LEFT FOOT DROP: ICD-10-CM

## 2024-04-16 DIAGNOSIS — M54.16 LUMBAR RADICULOPATHY: Primary | ICD-10-CM

## 2024-04-16 DIAGNOSIS — G56.02 CARPAL TUNNEL SYNDROME OF LEFT WRIST: ICD-10-CM

## 2024-04-16 DIAGNOSIS — G62.9 NEUROPATHY: ICD-10-CM

## 2024-04-16 PROCEDURE — 99245 OFF/OP CONSLTJ NEW/EST HI 55: CPT | Performed by: PSYCHIATRY & NEUROLOGY

## 2024-04-16 NOTE — PROGRESS NOTES
NEUROLOGY OUTPATIENT CONSULT NOTE   Apr 16, 2024     CHIEF COMPLAINT/REASON FOR VISIT/REASON FOR CONSULT  Patient presents with:  NEUROPATHY    REASON FOR CONSULTATION-abnormal EMG    REFERRAL SOURCE  Dr. Roz Norris*  CC Dr. Roz Norris*    HISTORY OF PRESENT ILLNESS  Wm Gaines is a 63 year old male with complicated neuromuscular history  1.  In 2021 he developed a left foot drop.  2.  In 2022 he was evaluated by spine providers and no clear cause for his left foot drop was identified.  He reports no back pain at that point.  No surgery was done.  3.  In 2023 January he had a fall which caused him to get a brace for his left foot.  There has been no improvement of the left foot till this time.  4.  In March 2023 he was having some tightness in his gluteus bilaterally.  He at that point had an MRI of his back and back surgery was recommended to him.  There was no improvement with his foot.  He continued to have difficulty with walking.  He was not very active for some time.  He was some minimal amount of numbness and tingling in both feet.  5.  In August 2023, there was worsening of numbness in the feet.  Reports there is a tightness and numbness going into the ankle in both feet.  He went back to his spine doctor at that time and an MRI of the lumbar spine was done as well as an EMG.  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  He was then seen by rheumatology who have ruled out any autoimmune disease.  He was referred to neurology for further evaluation.  6.  He has not done any physical therapy.  Does complain of some occasional numbness in his hands.  He does sleep with his head on his hands with some bending of the wrist at nighttime.  Does have Raynaud's phenomena.  No fasciculations.    Previous history is reviewed and this is unchanged.    PAST MEDICAL/SURGICAL HISTORY  Past Medical History:   Diagnosis Date    Arthritis      Patient Active Problem  List   Diagnosis    Family history of diabetes mellitus    Personal history of tobacco use, presenting hazards to health    Hip pain, right    Left knee pain    Chronic low back pain    Alcohol use    Primary osteoarthritis of right hip    Primary osteoarthritis of left knee    Primary osteoarthritis of left hip    Primary osteoarthritis of right knee    Status post total right knee replacement    Status post lumbar surgery   Significant for arthritis    FAMILY HISTORY  History reviewed. No pertinent family history.  Significant for vision loss, cancer/leukemia.  No neuropathy history.    SOCIAL HISTORY  Social History     Tobacco Use    Smoking status: Every Day     Current packs/day: 0.00     Average packs/day: 2.0 packs/day for 43.6 years (87.3 ttl pk-yrs)     Types: Cigarettes     Start date: 1980     Last attempt to quit: 2024     Years since quittin.2    Smokeless tobacco: Never   Vaping Use    Vaping status: Never Used   Substance Use Topics    Alcohol use: Not Currently    Drug use: No       SYSTEMS REVIEW  Twelve-system ROS was done and other than the HPI this was negative/positive for back pain, arm and leg pain, joint pain, numbness/tingling, weakness/paralysis, difficulty walking, falling, balance/coordination problems, sleepy during the day, anxiety, depression, bloating, weight gain.    MEDICATIONS  Current Outpatient Medications   Medication Sig Dispense Refill    ACETAMINOPHEN EXTRA STRENGTH 500 MG tablet Take 1 to 2 tablets by mouth three times daily as needed for mild pain. 540 tablet 1    gabapentin (NEURONTIN) 100 MG capsule Take 2 capsules (200 mg) by mouth 3 times daily 180 capsule 3    ibuprofen (ADVIL/MOTRIN) 800 MG tablet Take 1 tablet (800 mg) by mouth every 8 hours as needed for moderate pain 100 tablet 1     No current facility-administered medications for this visit.        PHYSICAL EXAMINATION  VITALS: /68   Pulse 100   Resp 18   Wt 103 kg (227 lb)   BMI 30.08  kg/m    GENERAL: Healthy appearing, alert, no acute distress, normal habitus.  CARDIOVASCULAR: Extremities warm and well perfused. Pulses present.   NEUROLOGICAL:  Patient is awake and oriented to self, place and time.  Attention span is normal.  Memory is grossly intact.  Language is fluent and follows commands appropriately.  Appropriate fund of knowledge. Cranial nerves 2-12 are intact. There is no pronator drift.  Motor exam shows 5/5 strength in all extremities except left foot drop.  Tone is symmetric bilaterally in upper and lower extremities.  Reflexes are symmetric and 2+ in upper extremities and absent in lower extremities. Sensory exam is grossly intact to light touch, pin prick and vibration except decreased vibratory sense in the feet.  Finger to nose and heel to shin is without dysmetria.  Romberg is negative.  Gait is at base.  He is unable to do tandem walking.  Unable to walk on his toes and heels because of foot drop.        DIAGNOSTICS  MRI L spine      EMG      RELEVANT LABS  Component      Latest Ref Rng 10/27/2023  10:14 AM 12/15/2023  10:05 AM   WBC      4.0 - 11.0 10e3/uL  5.2    RBC Count      4.40 - 5.90 10e6/uL  4.49    Hemoglobin      13.3 - 17.7 g/dL  13.9    Hematocrit      40.0 - 53.0 %  42.2    MCV      78 - 100 fL  94    MCH      26.5 - 33.0 pg  31.0    MCHC      31.5 - 36.5 g/dL  32.9    RDW      10.0 - 15.0 %  14.1    Platelet Count      150 - 450 10e3/uL  240    % Neutrophils      %  54    % Lymphocytes      %  34    % Monocytes      %  7    % Eosinophils      %  4    % Basophils      %  1    % Immature Granulocytes      %  0    Absolute Neutrophils      1.6 - 8.3 10e3/uL  2.8    Absolute Lymphocytes      0.8 - 5.3 10e3/uL  1.8    Absolute Monocytes      0.0 - 1.3 10e3/uL  0.4    Absolute Eosinophils      0.0 - 0.7 10e3/uL  0.2    Absolute Basophils      0.0 - 0.2 10e3/uL  0.0    Absolute Immature Granulocytes      <=0.4 10e3/uL  0.0    Sodium      135 - 145 mmol/L  139     Potassium      3.4 - 5.3 mmol/L  4.3    Carbon Dioxide (CO2)      22 - 29 mmol/L  29    Anion Gap      7 - 15 mmol/L  10    Urea Nitrogen      8.0 - 23.0 mg/dL  9.7    Creatinine      0.67 - 1.17 mg/dL  0.59 (L)    GFR Estimate      >60 mL/min/1.73m2  >90    Calcium      8.8 - 10.2 mg/dL  9.2    Chloride      98 - 107 mmol/L  100    Glucose      70 - 99 mg/dL  93    Alkaline Phosphatase      40 - 150 U/L  117    AST      0 - 45 U/L  37    ALT      0 - 70 U/L  38    Protein Total      6.4 - 8.3 g/dL  7.3    Albumin      3.5 - 5.2 g/dL  4.6    Bilirubin Total      <=1.2 mg/dL  0.4    Hemoglobin A1C      0.0 - 5.6 % 5.6     Sed Rate      0 - 20 mm/hr  14    CRP Inflammation      <5.00 mg/L  3.11    MARYSE interpretation      Negative   Negative       Legend:  (L) Low    Rheumatology labs reviewed.    OUTSIDE RECORDS  Outside referral notes and chart notes were reviewed and pertinent information has been summarized (in addition to the HPI):-      IMPRESSION/REPORT/PLAN  Lumbar radiculopathy  Carpal tunnel syndrome of left wrist  Left foot drop  Neuropathy      This is a 63 year old male with history of left foot drop, bilateral foot numbness, hand numbness intermittently, back issues.    1.  The bilateral foot numbness is suggestive of a length dependent polyneuropathy.  Previous EMG was abnormal though does not completely fit with his clinical symptoms and would recommend repeating the EMG.  Will check blood work look for causes of neuropathy.  Blood work so far has been negative.  Discussed prognosis.  Could consider MRI of his T-spine/C-spine if EMG is negative for neuropathy.    2.  The numbness in the hands could be related to carpal tunnel.  He does bend his wrist at nighttime when he sleeping.  Will see if EMG shows evidence of this.  Previous EMG was suggesting motor neuron disease and will need to repeat EMG to further evaluate.    3.  His left foot drop could be related to an L5-S1 lumbar radiculopathy on the  left side.  MRI does show some neuroforaminal stenosis.  Will see what the EMG shows.    4.  In terms of treatment would recommend physical therapy which she has not done so far.  He wants to do the testing first before doing physical therapy.    -     Vitamin B6; Future  -     Vitamin B12; Future  -     Vitamin B1 whole blood; Future  -     TSH with free T4 reflex; Future  -     Protein Immunofixation Serum; Future  -     Protein electrophoresis; Future  -     EMG; Future    Return in about 2 months (around 6/16/2024) for In-Clinic Visit (must), Add on PAOR, After testing.    Over 60 minutes were spent coordinating the care for the patient on the day of the encounter.  This includes previsit, during visit and post visit activities as documented above.  Counseling patient.  Complex case.  Multiple problems reviewed/addressed with multiple test reviewed.  Multiple test ordered.  (Activities include but not inclusive of reviewing chart, reviewing outside records, reviewing labs and imaging study results as well as the images, patient visit time including getting history and exam,  use if applicable, review of test results with the patient and coming up with a plan in a shared model, counseling patient and family, education and answering patient questions, EMR , EMR diagnosis entry and problem list management, medication reconciliation and prescription management if applicable, paperwork if applicable, printing documents and documentation of the visit activities.)        Checo Alatorre MD  Neurologist  [facility]  Tel:- [phone]    This note was dictated using voice recognition software.  Any grammatical or context distortions are unintentional and inherent to the software.

## 2024-04-16 NOTE — LETTER
4/16/2024         RE: Wm Gaines  [address]  [place] [address]        Dear Colleague,    Thank you for referring your patient, Wm Gaines, to the [facility] [place]. Please see a copy of my visit note below.    NEUROLOGY OUTPATIENT CONSULT NOTE   Apr 16, 2024     CHIEF COMPLAINT/REASON FOR VISIT/REASON FOR CONSULT  Patient presents with:  NEUROPATHY    REASON FOR CONSULTATION-abnormal EMG    REFERRAL SOURCE  Dr. Roz Norris*  CC Dr. Roz Norris*    HISTORY OF PRESENT ILLNESS  Wm Gaines is a 63 year old male with complicated neuromuscular history  1.  In 2021 he developed a left foot drop.  2.  In 2022 he was evaluated by spine providers and no clear cause for his left foot drop was identified.  He reports no back pain at that point.  No surgery was done.  3.  In 2023 January he had a fall which caused him to get a brace for his left foot.  There has been no improvement of the left foot till this time.  4.  In March 2023 he was having some tightness in his gluteus bilaterally.  He at that point had an MRI of his back and back surgery was recommended to him.  There was no improvement with his foot.  He continued to have difficulty with walking.  He was not very active for some time.  He was some minimal amount of numbness and tingling in both feet.  5.  In August 2023, there was worsening of numbness in the feet.  Reports there is a tightness and numbness going into the ankle in both feet.  He went back to his spine doctor at that time and an MRI of the lumbar spine was done as well as an EMG.  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  He was then seen by rheumatology who have ruled out any autoimmune disease.  He was referred to neurology for further evaluation.  6.  He has not done any physical therapy.  Does complain of some occasional numbness in his hands.  He does sleep with his head on his hands  with some bending of the wrist at nighttime.  Does have Raynaud's phenomena.  No fasciculations.    Previous history is reviewed and this is unchanged.    PAST MEDICAL/SURGICAL HISTORY  Past Medical History:   Diagnosis Date     Arthritis      Patient Active Problem List   Diagnosis     Family history of diabetes mellitus     Personal history of tobacco use, presenting hazards to health     Hip pain, right     Left knee pain     Chronic low back pain     Alcohol use     Primary osteoarthritis of right hip     Primary osteoarthritis of left knee     Primary osteoarthritis of left hip     Primary osteoarthritis of right knee     Status post total right knee replacement     Status post lumbar surgery   Significant for arthritis    FAMILY HISTORY  History reviewed. No pertinent family history.  Significant for vision loss, cancer/leukemia.  No neuropathy history.    SOCIAL HISTORY  Social History     Tobacco Use     Smoking status: Every Day     Current packs/day: 0.00     Average packs/day: 2.0 packs/day for 43.6 years (87.3 ttl pk-yrs)     Types: Cigarettes     Start date: 1980     Last attempt to quit: 2024     Years since quittin.2     Smokeless tobacco: Never   Vaping Use     Vaping status: Never Used   Substance Use Topics     Alcohol use: Not Currently     Drug use: No       SYSTEMS REVIEW  Twelve-system ROS was done and other than the HPI this was negative/positive for back pain, arm and leg pain, joint pain, numbness/tingling, weakness/paralysis, difficulty walking, falling, balance/coordination problems, sleepy during the day, anxiety, depression, bloating, weight gain.    MEDICATIONS  Current Outpatient Medications   Medication Sig Dispense Refill     ACETAMINOPHEN EXTRA STRENGTH 500 MG tablet Take 1 to 2 tablets by mouth three times daily as needed for mild pain. 540 tablet 1     gabapentin (NEURONTIN) 100 MG capsule Take 2 capsules (200 mg) by mouth 3 times daily 180 capsule 3     ibuprofen  (ADVIL/MOTRIN) 800 MG tablet Take 1 tablet (800 mg) by mouth every 8 hours as needed for moderate pain 100 tablet 1     No current facility-administered medications for this visit.        PHYSICAL EXAMINATION  VITALS: /68   Pulse 100   Resp 18   Wt 103 kg (227 lb)   BMI 30.08 kg/m    GENERAL: Healthy appearing, alert, no acute distress, normal habitus.  CARDIOVASCULAR: Extremities warm and well perfused. Pulses present.   NEUROLOGICAL:  Patient is awake and oriented to self, place and time.  Attention span is normal.  Memory is grossly intact.  Language is fluent and follows commands appropriately.  Appropriate fund of knowledge. Cranial nerves 2-12 are intact. There is no pronator drift.  Motor exam shows 5/5 strength in all extremities except left foot drop.  Tone is symmetric bilaterally in upper and lower extremities.  Reflexes are symmetric and 2+ in upper extremities and absent in lower extremities. Sensory exam is grossly intact to light touch, pin prick and vibration except decreased vibratory sense in the feet.  Finger to nose and heel to shin is without dysmetria.  Romberg is negative.  Gait is at base.  He is unable to do tandem walking.  Unable to walk on his toes and heels because of foot drop.        DIAGNOSTICS  MRI L spine      EMG      RELEVANT LABS  Component      Latest Ref Rng 10/27/2023  10:14 AM 12/15/2023  10:05 AM   WBC      4.0 - 11.0 10e3/uL  5.2    RBC Count      4.40 - 5.90 10e6/uL  4.49    Hemoglobin      13.3 - 17.7 g/dL  13.9    Hematocrit      40.0 - 53.0 %  42.2    MCV      78 - 100 fL  94    MCH      26.5 - 33.0 pg  31.0    MCHC      31.5 - 36.5 g/dL  32.9    RDW      10.0 - 15.0 %  14.1    Platelet Count      150 - 450 10e3/uL  240    % Neutrophils      %  54    % Lymphocytes      %  34    % Monocytes      %  7    % Eosinophils      %  4    % Basophils      %  1    % Immature Granulocytes      %  0    Absolute Neutrophils      1.6 - 8.3 10e3/uL  2.8    Absolute  Lymphocytes      0.8 - 5.3 10e3/uL  1.8    Absolute Monocytes      0.0 - 1.3 10e3/uL  0.4    Absolute Eosinophils      0.0 - 0.7 10e3/uL  0.2    Absolute Basophils      0.0 - 0.2 10e3/uL  0.0    Absolute Immature Granulocytes      <=0.4 10e3/uL  0.0    Sodium      135 - 145 mmol/L  139    Potassium      3.4 - 5.3 mmol/L  4.3    Carbon Dioxide (CO2)      22 - 29 mmol/L  29    Anion Gap      7 - 15 mmol/L  10    Urea Nitrogen      8.0 - 23.0 mg/dL  9.7    Creatinine      0.67 - 1.17 mg/dL  0.59 (L)    GFR Estimate      >60 mL/min/1.73m2  >90    Calcium      8.8 - 10.2 mg/dL  9.2    Chloride      98 - 107 mmol/L  100    Glucose      70 - 99 mg/dL  93    Alkaline Phosphatase      40 - 150 U/L  117    AST      0 - 45 U/L  37    ALT      0 - 70 U/L  38    Protein Total      6.4 - 8.3 g/dL  7.3    Albumin      3.5 - 5.2 g/dL  4.6    Bilirubin Total      <=1.2 mg/dL  0.4    Hemoglobin A1C      0.0 - 5.6 % 5.6     Sed Rate      0 - 20 mm/hr  14    CRP Inflammation      <5.00 mg/L  3.11    MARYSE interpretation      Negative   Negative       Legend:  (L) Low    Rheumatology labs reviewed.    OUTSIDE RECORDS  Outside referral notes and chart notes were reviewed and pertinent information has been summarized (in addition to the HPI):-      IMPRESSION/REPORT/PLAN  Lumbar radiculopathy  Carpal tunnel syndrome of left wrist  Left foot drop  Neuropathy      This is a 63 year old male with history of left foot drop, bilateral foot numbness, hand numbness intermittently, back issues.    1.  The bilateral foot numbness is suggestive of a length dependent polyneuropathy.  Previous EMG was abnormal though does not completely fit with his clinical symptoms and would recommend repeating the EMG.  Will check blood work look for causes of neuropathy.  Blood work so far has been negative.  Discussed prognosis.  Could consider MRI of his T-spine/C-spine if EMG is negative for neuropathy.    2.  The numbness in the hands could be related to carpal  tunnel.  He does bend his wrist at nighttime when he sleeping.  Will see if EMG shows evidence of this.  Previous EMG was suggesting motor neuron disease and will need to repeat EMG to further evaluate.    3.  His left foot drop could be related to an L5-S1 lumbar radiculopathy on the left side.  MRI does show some neuroforaminal stenosis.  Will see what the EMG shows.    4.  In terms of treatment would recommend physical therapy which she has not done so far.  He wants to do the testing first before doing physical therapy.    -     Vitamin B6; Future  -     Vitamin B12; Future  -     Vitamin B1 whole blood; Future  -     TSH with free T4 reflex; Future  -     Protein Immunofixation Serum; Future  -     Protein electrophoresis; Future  -     EMG; Future    Return in about 2 months (around 6/16/2024) for In-Clinic Visit (must), Add on PAOR, After testing.    Over 60 minutes were spent coordinating the care for the patient on the day of the encounter.  This includes previsit, during visit and post visit activities as documented above.  Counseling patient.  Complex case.  Multiple problems reviewed/addressed with multiple test reviewed.  Multiple test ordered.  (Activities include but not inclusive of reviewing chart, reviewing outside records, reviewing labs and imaging study results as well as the images, patient visit time including getting history and exam,  use if applicable, review of test results with the patient and coming up with a plan in a shared model, counseling patient and family, education and answering patient questions, EMR , EMR diagnosis entry and problem list management, medication reconciliation and prescription management if applicable, paperwork if applicable, printing documents and documentation of the visit activities.)        Checo Alatorre MD  Neurologist  [facility]  Tel:- [phone]    This note was dictated using voice recognition  software.  Any grammatical or context distortions are unintentional and inherent to the software.      Again, thank you for allowing me to participate in the care of your patient.        Sincerely,        Checo Alatorre MD

## 2024-04-19 ENCOUNTER — LAB (OUTPATIENT)
Dept: LAB | Facility: CLINIC | Age: 64
End: 2024-04-19
Payer: COMMERCIAL

## 2024-04-19 DIAGNOSIS — G62.9 NEUROPATHY: ICD-10-CM

## 2024-04-19 PROCEDURE — 82607 VITAMIN B-12: CPT

## 2024-04-19 PROCEDURE — 99000 SPECIMEN HANDLING OFFICE-LAB: CPT

## 2024-04-19 PROCEDURE — 36415 COLL VENOUS BLD VENIPUNCTURE: CPT

## 2024-04-19 PROCEDURE — 84207 ASSAY OF VITAMIN B-6: CPT | Mod: 90

## 2024-04-19 PROCEDURE — 84443 ASSAY THYROID STIM HORMONE: CPT

## 2024-04-19 PROCEDURE — 84155 ASSAY OF PROTEIN SERUM: CPT

## 2024-04-19 PROCEDURE — 84425 ASSAY OF VITAMIN B-1: CPT | Mod: 90

## 2024-04-19 PROCEDURE — 86334 IMMUNOFIX E-PHORESIS SERUM: CPT | Performed by: PATHOLOGY

## 2024-04-19 PROCEDURE — 84165 PROTEIN E-PHORESIS SERUM: CPT | Performed by: PATHOLOGY

## 2024-04-21 LAB
TOTAL PROTEIN SERUM FOR ELP: 6.9 G/DL (ref 6.4–8.3)
TSH SERPL DL<=0.005 MIU/L-ACNC: 2.37 UIU/ML (ref 0.3–4.2)
VIT B12 SERPL-MCNC: 713 PG/ML (ref 232–1245)

## 2024-04-22 LAB
ALBUMIN SERPL ELPH-MCNC: 4.3 G/DL (ref 3.7–5.1)
ALPHA1 GLOB SERPL ELPH-MCNC: 0.3 G/DL (ref 0.2–0.4)
ALPHA2 GLOB SERPL ELPH-MCNC: 0.7 G/DL (ref 0.5–0.9)
B-GLOBULIN SERPL ELPH-MCNC: 0.7 G/DL (ref 0.6–1)
GAMMA GLOB SERPL ELPH-MCNC: 0.9 G/DL (ref 0.7–1.6)
M PROTEIN SERPL ELPH-MCNC: 0 G/DL
PATH REPORT.COMMENTS IMP SPEC: NORMAL
PATH REPORT.COMMENTS IMP SPEC: NORMAL
PROT PATTERN SERPL ELPH-IMP: NORMAL
PROT PATTERN SERPL IFE-IMP: NORMAL
PYRIDOXAL PHOS SERPL-SCNC: 104.6 NMOL/L
VIT B1 PYROPHOSHATE BLD-SCNC: 163 NMOL/L

## 2024-04-26 ENCOUNTER — TELEPHONE (OUTPATIENT)
Dept: NEUROSURGERY | Facility: CLINIC | Age: 64
End: 2024-04-26

## 2024-04-26 ENCOUNTER — OFFICE VISIT (OUTPATIENT)
Dept: NEUROSURGERY | Facility: CLINIC | Age: 64
End: 2024-04-26
Payer: COMMERCIAL

## 2024-04-26 VITALS
SYSTOLIC BLOOD PRESSURE: 137 MMHG | BODY MASS INDEX: 30.09 KG/M2 | HEIGHT: 73 IN | OXYGEN SATURATION: 95 % | DIASTOLIC BLOOD PRESSURE: 87 MMHG | HEART RATE: 106 BPM | WEIGHT: 227 LBS

## 2024-04-26 DIAGNOSIS — R20.0 NUMBNESS: Primary | ICD-10-CM

## 2024-04-26 DIAGNOSIS — R53.1 WEAKNESS: ICD-10-CM

## 2024-04-26 PROCEDURE — 99203 OFFICE O/P NEW LOW 30 MIN: CPT | Performed by: SURGERY

## 2024-04-26 ASSESSMENT — PAIN SCALES - GENERAL: PAINLEVEL: NO PAIN (1)

## 2024-04-26 NOTE — NURSING NOTE
"Wm Gaines is a 63 year old male who presents for:  Chief Complaint   Patient presents with    Consult     2nd opinion   L fusion 4/2023   Left foot drop pre op   Bilateral foot numbness post op, left>right  Low back pain post op         Initial Vitals:  /87   Pulse 106   Ht 6' 1\" (1.854 m)   Wt 227 lb (103 kg)   SpO2 95%   BMI 29.95 kg/m   Estimated body mass index is 29.95 kg/m  as calculated from the following:    Height as of this encounter: 6' 1\" (1.854 m).    Weight as of this encounter: 227 lb (103 kg).. Body surface area is 2.3 meters squared. BP completed using cuff size: regular  No Pain (1)    Oswestry Disability Index (BRISA   John Burks 1980, All rights reserved. Used with permission)    Section 1 - Pain intensity: The pain is fairly severe at the moment.  Section 2 - Personal care (washing, dressing, etc.) : I can look after myself normally without causing additional pain.  Section 3 - Lifting: Pain prevents me from lifting heavy weights off the floor but I can manage if they are conveniently positioned, e.g. on a table.  Section 4 - Walking: Pain prevents me from walking more than 100 yards.  Section 5 - Sitting: I can sit in any chair as long as I like.  Section 6 - Standing: Pain prevents me from standing for more than 10 minutes.  Section 7 - Sleeping: My sleep is never interrupted by pain.  Section 8 - Sex life (if applicable): My sex life is severely restricted by pain.  Section 9 - Social life: My social life is normal and causes me no additional pain.  Section 10 - Traveling: I can travel anywhere but it gives me additional pain.  Sum: 16  Count: 10  Oswestry Score (%): 32 %       Paddy Valdez  "

## 2024-04-26 NOTE — PROGRESS NOTES
The patient is a 63-year-old male.  His chief complaint is numb feet.  In April 2023 he had a lumbar decompression and fusion by Dr. Rico at L3-4 and L4-5.  His numbness started in August or September 2023.  He has had a left foot drop since 2021.  He complains of left low back pain but no radicular pain down the left leg.  He does complain of pain in the first and second toes bilaterally.  Since surgery he had an EMG in December 2023 which shows diffuse denervation including paraspinal muscles and upper extremity as well as lower.  A lumbar MRI in September 2023 shows bilateral foramen stenosis at L5-S1.  He did see Dr. Alatorre and had multiple lab testing which was negative.  At this point I would like to get MRIs of the cervical and thoracic and lumbar spine.  Flexion-extension views of his lumbar spine.  CT of the lumbar spine.  Repeat the EMG looking for L5 radiculopathy.  If all of the foregoing is negative I would probably think about a neuromuscular specialist at the [facility].  Total time 30 minutes, more than 50% spent counseling and/or coordinating care.    I discussed the patient's EMG with Dr. Patel.  We think the patient has a neuromuscular disorder and should be seen at the neuromuscular clinic at the [facility].  New EMG canceled.

## 2024-04-26 NOTE — TELEPHONE ENCOUNTER
Left message for patient to call back and discuss the updated plan after his visit today. Per Dr Lam, after his discussion with Dr Patel, patient should be seen by Neuromuscular clinic at the [facility]. New EMG order cancelled, imaging still to be completed.

## 2024-04-26 NOTE — LETTER
4/26/2024         RE: Wm Gaines  [address]  [place] [address]        Dear Colleague,    Thank you for referring your patient, Wm Gaines, to the [facility] SP[facility]UR[facility]ERY. Please see a copy of my visit note below.    The patient is a 63-year-old male.  His chief complaint is numb feet.  In April 2023 he had a lumbar decompression and fusion by Dr. Rico at L3-4 and L4-5.  His numbness started in August or September 2023.  He has had a left foot drop since 2021.  He complains of left low back pain but no radicular pain down the left leg.  He does complain of pain in the first and second toes bilaterally.  Since surgery he had an EMG in December 2023 which shows diffuse denervation including paraspinal muscles and upper extremity as well as lower.  A lumbar MRI in September 2023 shows bilateral foramen stenosis at L5-S1.  He did see Dr. Alatorre and had multiple lab testing which was negative.  At this point I would like to get MRIs of the cervical and thoracic and lumbar spine.  Flexion-extension views of his lumbar spine.  CT of the lumbar spine.  Repeat the EMG looking for L5 radiculopathy.  If all of the foregoing is negative I would probably think about a neuromuscular specialist at the [facility].  Total time 30 minutes, more than 50% spent counseling and/or coordinating care.      Again, thank you for allowing me to participate in the care of your patient.        Sincerely,        Parth Lam MD

## 2024-04-29 DIAGNOSIS — G70.9 NEUROMUSCULAR DISEASE (H): Primary | ICD-10-CM

## 2024-04-29 DIAGNOSIS — R20.0 NUMBNESS: Primary | ICD-10-CM

## 2024-04-29 DIAGNOSIS — R53.1 WEAKNESS: ICD-10-CM

## 2024-04-29 NOTE — TELEPHONE ENCOUNTER
Called Wm and discussed a referral to neuromuscular clinic at the [facility]. Reminded about imaging that still should be completed.  Whitney vEans RN, CNRN

## 2024-04-29 NOTE — TELEPHONE ENCOUNTER
[facility] Call Center    Phone Message    May a detailed message be left on voicemail: yes     Reason for Call: Other: Patient is calling in regards to the referral  and has some questions. Please call back when available.      Action Taken: Other: Neurosurgery    Travel Screening: Not Applicable

## 2024-05-01 ENCOUNTER — MYC MEDICAL ADVICE (OUTPATIENT)
Dept: FAMILY MEDICINE | Facility: CLINIC | Age: 64
End: 2024-05-01

## 2024-05-01 ENCOUNTER — PRE VISIT (OUTPATIENT)
Dept: NEUROLOGY | Facility: CLINIC | Age: 64
End: 2024-05-01

## 2024-05-01 ENCOUNTER — TELEPHONE (OUTPATIENT)
Dept: NEUROLOGY | Facility: CLINIC | Age: 64
End: 2024-05-01

## 2024-05-01 ENCOUNTER — MYC MEDICAL ADVICE (OUTPATIENT)
Dept: NEUROLOGY | Facility: CLINIC | Age: 64
End: 2024-05-01

## 2024-05-01 ENCOUNTER — OFFICE VISIT (OUTPATIENT)
Dept: NEUROLOGY | Facility: CLINIC | Age: 64
End: 2024-05-01
Payer: COMMERCIAL

## 2024-05-01 VITALS
DIASTOLIC BLOOD PRESSURE: 98 MMHG | HEIGHT: 73 IN | OXYGEN SATURATION: 97 % | HEART RATE: 98 BPM | BODY MASS INDEX: 30.28 KG/M2 | SYSTOLIC BLOOD PRESSURE: 137 MMHG | WEIGHT: 228.5 LBS

## 2024-05-01 DIAGNOSIS — R20.0 NUMBNESS: ICD-10-CM

## 2024-05-01 DIAGNOSIS — R53.1 WEAKNESS: ICD-10-CM

## 2024-05-01 DIAGNOSIS — R53.1 WEAKNESS: Primary | ICD-10-CM

## 2024-05-01 DIAGNOSIS — G70.9 NEUROMUSCULAR DISEASE (H): ICD-10-CM

## 2024-05-01 PROCEDURE — 99417 PROLNG OP E/M EACH 15 MIN: CPT | Performed by: PSYCHIATRY & NEUROLOGY

## 2024-05-01 PROCEDURE — 99215 OFFICE O/P EST HI 40 MIN: CPT | Performed by: PSYCHIATRY & NEUROLOGY

## 2024-05-01 ASSESSMENT — PAIN SCALES - GENERAL: PAINLEVEL: MILD PAIN (3)

## 2024-05-01 NOTE — PROGRESS NOTES
HPI    63 year old man who first noted left steppage gait in , but speculates that symptoms may have started earlier. 2023, tight gluteal and hamstring muscles, abrupt onset; symptoms resolved gradually but based upon MRI and consultation with a surgeon, in 2023 - low back surgery. 2023, developed left low back and flank pain as well as numbness in both feet. Repeat MRI was unremarkable but EMG was abnormal. Since then symptoms worsened, then stabilized. Currently has numbness in both feet to distal calves, occasional shooting pains, positional paresthesias at night in hands, suspects mild weakness right ankle. No other motor or sensory deficits in the upper limbs, no truncal weakness, bulbar symptoms, ocular symptoms, or cognitive symptoms.     PMH    Bilateral hip and knee replacements    ROS    Cardiopulmonary ROS negative; no unexplained weight loss. No unexplained persistent rashes, renal disease; see rheumatology note for complete ROS. Night sweats January-Feb, no resolved. No unexplained pain, MSK or neuropathic, except as noted. Denies muscle cramps, fasciculations, or myotonia. Does report hammer toes.       Family history    2 brothers, one sister; sister  of cancer, one brother  of unknown causes.  Mother  age 74, renal failure and infection.  Father  age 70s, complications of surgery.     Social history  Smokes  Minimal alcohol use      Mental state: Alert, appropriate, speech, language, and thought content normal.      Cranial nerves II-XII, including pupils, auditory acuity, neck flexion/extension, bulbar exam: normal. Negative HIT.      Sensory examination:       Right Left   Light touch WBNL WBNL   Vibration (timed) MM4 MM4   Vibration (Rydell-Seiffer)       Temp       Pin MC MC   Pos       Legend:   MM = medial malleolus, TT = tibial tuberosity, K = patella, MCP = MCP joint  MF = mid-foot, DC = distal calf, MC = mid calf, PC = proximal calf        Motor  examination:       Right Left   Shoulder abduction        5 4*   Elbow extension 5 5   Elbow flexion 5 5   Wrist extension         5 5   Finger extension 5 5-   FDI 5 5   APB 4 4   Hip flexion 5 5   Knee flexion 5 5   Knee extension 5 5   Dorsiflexion 4 1   Plantar flexion 5 2   A=atrophy     *prior rotator cuff surgery    IS 5/4  Pronation 5/5  Supination 5/4+  FPL5/5  FDP 5/4+    Ankle inversion 5-/1  Ankle eversion 5-/1    No  or percussion myotonia      Tone normal     Reflexes:    Right Left   Biceps 2 0   BRD 2 0   Triceps 2 2   Patellar 0 0   Achilles 0 0   Plantar Flexor Flexor   Clonus Absent Absent    Absent Davie's, masseter reflexes.    Coordination:  Finger-nose normal.  Heel-shin normal.  RRMs normal.     Gait:  Independent.    I personally reviewed his prior neurology and rheumatology consultations, as well as laboratory studies ordered by those providers, and most recent MRI lumbar spine report. I personally reviewed and interpreted his EMG.      Impression:  Syndrome of bilateral distal LE weakness, modest sensory findings, moderate sensory symptoms.  EMG demonstrates fibrillation potentials in every muscle, with motor unit remodeling restricted to distal muscles, and no report of whether fasciculations were present.   MRI demonstrates adequate spinal decompression and severe bilateral foraminal stenosis at L5-S1; sensory symptoms developed coincident with abrupt onset of new low back pain.  UE nerve conduction studies do not demonstrate substantial abnormalities (as might be seen, for example, in widespread MMPlx or HNPP).    Recommendations:  Repeat EMG.  Lumbar MRI with and without contrast.  Blood test (CK).  Please call if your symptoms worsen in the interim.    We will schedule the testing same day here at the [facility] (per patient request).    Kashmir Torres M.D.    85 minutes spent on the date of the encounter on chart review, history and examination, documentation and further activities as  noted above.

## 2024-05-01 NOTE — PATIENT INSTRUCTIONS
Repeat EMG.  Lumbar MRI with and without contrast.  Blood test today.  Please call if your symptoms worsen in the interim.    We will schedule the testing same day here at the [facility].    Please call Chaya @ [phone] for questions or concerns during regular business hours. For a more efficient way to communicate, use ArriveBefore and address the message to your physician. Remember, MyChart is only read during business hours. Do not leave urgent messages on voicemail or ReviverMxt. If situation is urgent, contact the Neurology Clinic @ [phone] and ask to speak to a Triage Nurse or Call 911 or visit an Emergency Department.     Please call your pharmacy if you need a medication refill. They will send us an electronic message.

## 2024-05-01 NOTE — NURSING NOTE
Chief Complaint   Patient presents with    New Patient    NEUROPATHY       Vitals were taken and medications were reconciled.    Kellee Bravo, Technician  2:56 PM  May 1, 2024

## 2024-05-01 NOTE — TELEPHONE ENCOUNTER
Action 24 MV 11.28am   Action Taken Called [facility] to push images     Action 24 MV 12.58pm   Action Taken Images resolved in PACS         RECORDS RECEIVED FROM: internal   REASON FOR VISIT: neuropathy   PROVIDER: Dr Kashmir Torres   DATE OF APPT: 24   NOTES (FOR ALL VISITS) STATUS DETAILS   OFFICE NOTE from referring provider Internal Dr Parth Lam @ [facility] Neurosur24   OFFICE NOTE from other specialist Internal/Received Dr Checo Alatorre @ [facility] Neurology:  24    Dr Philip Rico @ [facility]:  12/13/23  3/22/23  10/25/23  6/14/23    Shi AKHTAR @ [facility]:  23    Tito AKHTAR @ [facility]:  7/26/23  5/10/23   OPERATIVE REPORT Internal [facility]:  23  LEFT LUMBAR 3-4 AND LEFT LUMBAR 4-5 TRANSFORAMINAL LUMBAR INTERBODY FUSION WITH LAMINECTOMY USING STEALTH NAVIGATION    EMG Received [facility]:  23   MEDICATION LIST Internal    IMAGING  (FOR ALL VISITS)     MRI (HEAD, NECK, SPINE) PACS [facility]:  MRI Lumbar Spine 9/15/23  MRI Lumbar Spine 3/15/23**

## 2024-05-01 NOTE — LETTER
2024       RE: Wm Gaines  [address]  [place] [address]       Dear Colleague,    Thank you for referring your patient, Wm Gaines, to the [facility] NEUROLOGY CLINIC [place] at [facility]. Please see a copy of my visit note below.    HPI    63 year old man who first noted left steppage gait in , but speculates that symptoms may have started earlier. 2023, tight gluteal and hamstring muscles, abrupt onset; symptoms resolved gradually but based upon MRI and consultation with a surgeon, in 2023 - low back surgery. 2023, developed left low back and flank pain as well as numbness in both feet. Repeat MRI was unremarkable but EMG was abnormal. Since then symptoms worsened, then stabilized. Currently has numbness in both feet to distal calves, occasional shooting pains, positional paresthesias at night in hands, suspects mild weakness right ankle. No other motor or sensory deficits in the upper limbs, no truncal weakness, bulbar symptoms, ocular symptoms, or cognitive symptoms.     PMH    Bilateral hip and knee replacements    ROS    Cardiopulmonary ROS negative; no unexplained weight loss. No unexplained persistent rashes, renal disease; see rheumatology note for complete ROS. Night sweats January-Feb, no resolved. No unexplained pain, MSK or neuropathic, except as noted. Denies muscle cramps, fasciculations, or myotonia. Does report hammer toes.       Family history    2 brothers, one sister; sister  of cancer, one brother  of unknown causes.  Mother  age 74, renal failure and infection.  Father  age 70s, complications of surgery.     Social history  Smokes  Minimal alcohol use      Mental state: Alert, appropriate, speech, language, and thought content normal.      Cranial nerves II-XII, including pupils, auditory acuity, neck flexion/extension, bulbar exam: normal. Negative HIT.       Sensory examination:       Right Left   Light touch WBNL WBNL   Vibration (timed) MM4 MM4   Vibration (Rydell-Seiffer)       Temp       Pin MC MC   Pos       Legend:   MM = medial malleolus, TT = tibial tuberosity, K = patella, MCP = MCP joint  MF = mid-foot, DC = distal calf, MC = mid calf, PC = proximal calf        Motor examination:       Right Left   Shoulder abduction        5 4*   Elbow extension 5 5   Elbow flexion 5 5   Wrist extension         5 5   Finger extension 5 5-   FDI 5 5   APB 4 4   Hip flexion 5 5   Knee flexion 5 5   Knee extension 5 5   Dorsiflexion 4 1   Plantar flexion 5 2   A=atrophy     *prior rotator cuff surgery    IS 5/4  Pronation 5/5  Supination 5/4+  FPL5/5  FDP 5/4+    Ankle inversion 5-/1  Ankle eversion 5-/1    No  or percussion myotonia      Tone normal     Reflexes:    Right Left   Biceps 2 0   BRD 2 0   Triceps 2 2   Patellar 0 0   Achilles 0 0   Plantar Flexor Flexor   Clonus Absent Absent    Absent Davie's, masseter reflexes.    Coordination:  Finger-nose normal.  Heel-shin normal.  RRMs normal.     Gait:  Normal.    I personally reviewed his prior neurology and rheumatology consultations, as well as laboratory studies ordered by those providers, and most recent MRI lumbar spine report. I personally reviewed and interpreted his EMG.      Impression:  Syndrome of bilateral distal LE weakness, modest sensory findings, moderate sensory symptoms.  EMG demonstrates fibrillation potentials in every muscle, with motor unit remodeling restricted to distal muscles, and no report of whether fasciculations were present.   MRI demonstrates adequate spinal decompression and severe bilateral foraminal stenosis at L5-S1; sensory symptoms developed coincident with abrupt onset of new low back pain.  UE nerve conduction studies do not demonstrate substantial abnormalities (as might be seen, for example, in widespread MMPlx or HNPP).    Recommendations:  Repeat EMG.  Lumbar MRI with and  without contrast.  Blood test (CK).  Please call if your symptoms worsen in the interim.    We will schedule the testing same day here at the [facility] (per patient request).        Again, thank you for allowing me to participate in the care of your patient.      Sincerely,    Kashmir Torres MD

## 2024-05-01 NOTE — TELEPHONE ENCOUNTER
Scheduled per Dr. Blair, Dr. Torres, and Joss AGUILAR    Same day appt 3:00pm 5/1 NEW with Dr. Brian Schuster on 5/1/2024 at 9:45 AM

## 2024-05-03 ENCOUNTER — ANCILLARY PROCEDURE (OUTPATIENT)
Dept: GENERAL RADIOLOGY | Facility: CLINIC | Age: 64
End: 2024-05-03
Attending: SURGERY
Payer: COMMERCIAL

## 2024-05-03 DIAGNOSIS — R20.0 NUMBNESS: ICD-10-CM

## 2024-05-03 DIAGNOSIS — R53.1 WEAKNESS: ICD-10-CM

## 2024-05-03 PROCEDURE — 72114 X-RAY EXAM L-S SPINE BENDING: CPT | Mod: TC | Performed by: RADIOLOGY

## 2024-05-29 ENCOUNTER — OFFICE VISIT (OUTPATIENT)
Dept: NEUROLOGY | Facility: CLINIC | Age: 64
End: 2024-05-29
Payer: COMMERCIAL

## 2024-05-29 ENCOUNTER — LAB (OUTPATIENT)
Dept: LAB | Facility: CLINIC | Age: 64
End: 2024-05-29
Payer: COMMERCIAL

## 2024-05-29 DIAGNOSIS — R53.1 WEAKNESS: ICD-10-CM

## 2024-05-29 LAB
CK SERPL-CCNC: 594 U/L (ref 39–308)
Lab: NORMAL
PERFORMING LABORATORY: NORMAL
SPECIMEN STATUS: NORMAL
TEST NAME: NORMAL

## 2024-05-29 PROCEDURE — 95887 MUSC TST DONE W/N TST NONEXT: CPT | Performed by: PSYCHIATRY & NEUROLOGY

## 2024-05-29 PROCEDURE — 83520 IMMUNOASSAY QUANT NOS NONAB: CPT | Performed by: PATHOLOGY

## 2024-05-29 PROCEDURE — 36415 COLL VENOUS BLD VENIPUNCTURE: CPT | Performed by: PATHOLOGY

## 2024-05-29 PROCEDURE — 95886 MUSC TEST DONE W/N TEST COMP: CPT | Performed by: PSYCHIATRY & NEUROLOGY

## 2024-05-29 PROCEDURE — 82550 ASSAY OF CK (CPK): CPT | Performed by: PATHOLOGY

## 2024-05-29 PROCEDURE — 95912 NRV CNDJ TEST 11-12 STUDIES: CPT | Performed by: PSYCHIATRY & NEUROLOGY

## 2024-05-29 PROCEDURE — 95885 MUSC TST DONE W/NERV TST LIM: CPT | Mod: 59 | Performed by: PSYCHIATRY & NEUROLOGY

## 2024-05-29 NOTE — PROGRESS NOTES
[facility]  Electrodiagnostic Laboratory                 Department of Neurology                                                                                                         Test Date:  2024    Patient: Wm Gaines : 1960 Physician: Kashmir Torres MD   Sex: Male AGE: 63 year Ref Phys: Kashmir Torres MD   ID#: [id number]   Technician: Yaquelin Lowry     History and Examination:  Wm Gaines is a 63 year old man with bilateral weakness at the ankles, worse on the left, of longstanding, prior lumbar fusion, recurrent low back pain, and a recent abnormal EMG. Examination on the date of the study demonstrates the following:         Right Left   Shoulder abduction        5 4   Elbow extension 5 5   Elbow flexion 5 5   Wrist extension         5 5   Finger extension 5 5   FDI 5 5   APB 4 4+   Hip flexion 5 5   Knee flexion 5 5   Knee extension 5 5   Dorsiflexion 4 1   Plantar flexion 5 2   FDP 4+/4+  Hip adduction, abduction, and extension all 5/5       He has been referred for further assessment of multilevel radiculopathy vs polyneuropathy.     Techniques:  Motor conduction studies were done with surface recording electrodes. Sensory conduction studies were performed with surface electrodes, unless indicated otherwise by (n), designating the use of subdermal recording electrodes. Temperature was monitored and recorded throughout the study. Upper extremities were maintained at a temperature of 32 degrees Centigrade or higher.  EMG was done with a concentric needle electrode.     Results:  Bilateral sural and left radial sensory nerve action potential amplitudes were moderately attenuated. A left median sensory conduction study demonstrated moderately severe slowing of conduction and marginal attenuation of amplitude. A left ulnar sensory conduction study was normal. Fibular compound muscle action potentials were absent, recording from intrinsic foot muscles, and  markedly attenuated recording from the left tibialis anterior. Tibial compound muscle action potentials were severely attenuated, with moderate prolongation of distal latency and minimal slowing of conduction, bilaterally. A left median motor conduction study demonstrated mild to moderate prolongation of latency. A left ulnar motor conduction study demonstrated marginal slowing of conduction across the elbow. Electromyography of the left upper limb demonstrated prominent abnormal spontaneous activity and moderate evidence of motor unit remodeling in muscles innervated by the C6 segment. Less prominent fibrillation potentials were noted in muscles sharing C7 and C8 segmental innervation. Electromyography of the lower limbs demonstrated evidence of variably severe fibrillation potentials and motor unit remodeling localizing best to bilateral L5 and S1, as well as left L4, segments. Reduced insertional activity in the left tibialis anterior, markedly reduced recruitment in several muscles as indicated, and prominent complex repetitive discharges speak to a very longstanding process. Electromyography of thoracic paraspinal muscles demonstrated a single and non-sustained run of positive sharp waves at the T9 level and was otherwise normal. Fasciculation potentials were not noted in any muscle studied.     Interpretation:  This is an abnormal study, demonstrating electrophysiologic evidence of the following:    Complex electrophysiologic findings that may reflect severe, longstanding bilateral lumbosacral and left cervical radiculopathies, most notably at bilateral S1, bilateral L5, and left C6 levels. See comment.  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  Moderate left median neuropathy at the wrist.  Possible mild left ulnar neuropathy at the elbow.      Comment:  Because of the presence of milder abnormalities at adjacent levels, a widespread anterior myelopathy or disorder of motor neurons is not  excluded, but may be less likely given the presence of documented spinal structural disease, the relative prominence of cervical findings to the C6 level, the absence of definite thoracic paraspinal abnormalities, and the absence of fasciculation potentials. Correlation with clinical and, if indicated, imaging and laboratory findings will be helpful.       _____________________________  Kashmir Torres MD  Board Certified in Clinical Neurophysiology and Neuromuscular Medicine        Nerve Conduction Studies  Motor Sites      Latency Amplitude Neg. Amp Diff Segment Distance Velocity Neg. Dur Neg Area Diff Temperature Comment   Site (ms) Norm (mV) Norm (%)  cm m/s Norm (ms) (%) ( C)    Left Dp Branch Fibular (TA) Motor   Fib Head 3.6  < 6.0 0.20 -      8.3  28.8 9.5 cm   Pop Fossa 7.6  < 5.7 0.09 - -55 Pop Fossa-Fib Head 11.5 29 - 10.1 -37 28.8 moved G1   Right Dp Branch Fibular (TA) Motor   Fib Head 3.7  < 6.0 1.47 -      8.9  30.5 10.2 cm   Pop Fossa 5.5  < 5.7 1.53 - 4 Pop Fossa-Fib Head 9.5 53 - 9.5 8 30.4    Left Fibular (EDB) Motor   Ankle NR  < 6.0 NR  > 2.0  Ankle-EDB 8   NR  29.6 moved G1   Right Fibular (EDB) Motor   Ankle NR  < 6.0 NR  > 2.0  Ankle-EDB 8   NR  30.5 moved G1   Bel Fib Head NR - NR - NR Bel Fib Head-Ankle - NR  > 38 NR NR 30.4    Pop Fossa NR - NR - NR Pop Fossa-Bel Fib Head - NR  > 38 NR NR 30.4    Left Median (APB) Motor   Wrist 5.2  < 4.4 5.0  > 5.0  Wrist-APB 8   6.2  33.2 moved G1   Elbow 9.7 - 4.0  > 5.0 -20 Elbow-Wrist 23 51  > 48 7.4 -14 32.5    Arm 11.3 - 3.8  > 5.0 -5 Arm-Elbow 10 63 - 7.4 -6 33    Left Tibial (AHB) Motor   Ankle 8.8  < 6.5 0.19  > 5.0  Ankle-AHB 5.8   7.5  29 moved G1   Knee 21.7 - 0.05 - -74 Knee-Ankle 44.2 34  > 38 8.3 -84 28.7    Right Tibial (AHB) Motor   Ankle 8.2  < 6.5 0.09  > 5.0  Ankle-AHB 8   4.2  30.5 moved G1   Knee 20.7 - 0.07 - -22 Knee-Ankle 45.8 37  > 38 2.8 -50 30.5    Left Ulnar (ADM) Motor   Wrist 3.2  < 3.5 6.0  > 5.0  Wrist-ADM 8   6.0  30.7    Bel  Elbow 7.9 - 4.9 - -18 Bel Elbow-Wrist 23.2 49  > 48 6.8 -10 30.5    Abv Elbow 10.0 - 4.7 - -4 Abv Elbow-Bel Elbow 9.6 46  > 48 6.9 -1 30.4      F-Wave Sites      Min F-Lat Max-Min F-Lat Mean F-Lat   Site (ms) (ms) (ms)   Left Median F-Wave   Wrist 34.4 - 34.4   Left Ulnar F-Wave   Wrist 32.4 3.4 34.5     Sensory Sites      Onset Lat Peak Lat Amp (O-P) Amp (P-P) Segment Distance Velocity Temperature Comment   Site ms (ms)  V Norm ( V)  cm m/s Norm ( C)    Left Median Sensory   Wrist-Dig II 3.9 5.2 9  > 10 13 Wrist-Dig II 14 36  > 48 31.4    Left Radial Sensory   Forearm-Wrist 1.88 2.6 9  > 15 12 Forearm-Wrist 10 53 - 30.8 moved G1   Left Sural Sensory   Calf-Lat Mall (n) 3.4 4.5 2  > 5 2 Calf-Lat Mall 14 41  > 38 27.9    Right Sural Sensory   Calf-Lat Mall (n) 3.3 4.0 2  > 5 2 Calf-Lat Mall 12.9 39  > 38 30    Left Ulnar Sensory   Wrist-Dig V 2.6 3.8 13  > 8 24 Wrist-Dig V 12.5 48  > 48 31.9        Electromyography     Side Muscle Ins Act Fibs/PSW Fasc HF Amp Dur Poly Recrt Int Pat   Left Pronator Teres Nml 3+ Nml 0 1+ Nml 2+ SevRed Nml   Left Biceps Nml 2+ Nml 0 Nml Nml 2+ Sergei Nml   Left EDC Nml 1+ Nml 0 Nml Nml 0 Nml Nml   Left FDI Nml 1+ Nml 0 Nml Nml 0 Nml Nml   Left FPL Nml 1+ Nml 0 Nml Nml 0 Nml Nml   Left Brachiorad Nml 2+ Nml 0 Nml Nml 0 Nml Nml   Left Deltoid Nml None Nml 0 Nml Nml 0 Sergei Nml   Left Triceps LongHd Nml 1+ Nml 0 Nml Nml 0 Nml Nml   Left C6 Parasp Nml 2+ Nml 0        Left Gluteus Med Nml 1+ Nml 0 1+ 1+ 0 ModRed Nml   Left Vastus Lat Nml 2+ Nml 0 2+ 2+ 0 SevRed Nml   Left Tib Anterior Decr None Nml 0 Nml Nml 0 None Nml   Left Gastroc MH Nml 1+ Nml 0 2- 2- 0 Discrete Nml   Left Biceps Fem SH Nml 3+ Nml CRD 2+ 2+ 0 SevRed Nml   Right Tib Anterior Nml None Nml CRD 2+ 2+ 0 SevRed Nml   Right Gastroc MH Nml 2+ Nml 0 Nml Nml 2+ SevRed Nml   Right Biceps Fem SH Nml 2+ Nml CRD 1+ 1+ 0 ModRed Nml   Right Gluteus Med Nml None Nml 0 1+ 1+ 0 Nml Nml   Right T6 Parasp Nml None Nml 0        Right T9  Parasp Incr None Nml 0              NCS Waveforms:    Motor                           Sensory                  F-Wave

## 2024-05-29 NOTE — LETTER
2024       RE: Wm Gaines  [address]  [place] [address]       Dear Colleague,    Thank you for referring your patient, Wm Gaines, to the [facility] EMG [facility] [place] at [facility]. Please see a copy of my visit note below.                        [facility]  Electrodiagnostic Laboratory                 Department of Neurology                                                                                                         Test Date:  2024    Patient: Wm Gaines : 1960 Physician: Kashmir Torres MD   Sex: Male AGE: 63 year Ref Phys: Kashmir Torres MD   ID#: [id number]   Technician: Yaquelin Lowry     History and Examination:  Wm Gaines is a 63 year old man with bilateral weakness at the ankles, worse on the left, of longstanding, prior lumbar fusion, recurrent low back pain, and a recent abnormal EMG. Examination on the date of the study demonstrates the following:         Right Left   Shoulder abduction        5 4   Elbow extension 5 5   Elbow flexion 5 5   Wrist extension         5 5   Finger extension 5 5   FDI 5 5   APB 4 4+   Hip flexion 5 5   Knee flexion 5 5   Knee extension 5 5   Dorsiflexion 4 1   Plantar flexion 5 2   FDP 4+/4+  Hip adduction, abduction, and extension all 5/5       He has been referred for further assessment of multilevel radiculopathy vs polyneuropathy.     Techniques:  Motor conduction studies were done with surface recording electrodes. Sensory conduction studies were performed with surface electrodes, unless indicated otherwise by (n), designating the use of subdermal recording electrodes. Temperature was monitored and recorded throughout the study. Upper extremities were maintained at a temperature of 32 degrees Centigrade or higher.  EMG was done with a concentric needle electrode.     Results:  Bilateral sural and left radial sensory nerve action potential  amplitudes were moderately attenuated. A left median sensory conduction study demonstrated moderately severe slowing of conduction and marginal attenuation of amplitude. A left ulnar sensory conduction study was normal. Fibular compound muscle action potentials were absent, recording from intrinsic foot muscles, and markedly attenuated recording from the left tibialis anterior. Tibial compound muscle action potentials were severely attenuated, with moderate prolongation of distal latency and minimal slowing of conduction, bilaterally. A left median motor conduction study demonstrated mild to moderate prolongation of latency. A left ulnar motor conduction study demonstrated marginal slowing of conduction across the elbow. Electromyography of the left upper limb demonstrated prominent abnormal spontaneous activity and moderate evidence of motor unit remodeling in muscles innervated by the C6 segment. Less prominent fibrillation potentials were noted in muscles sharing C7 and C8 segmental innervation. Electromyography of the lower limbs demonstrated evidence of variably severe fibrillation potentials and motor unit remodeling localizing best to bilateral L5 and S1, as well as left L4, segments. Reduced insertional activity in the left tibialis anterior, markedly reduced recruitment in several muscles as indicated, and prominent complex repetitive discharges speak to a very longstanding process. Electromyography of thoracic paraspinal muscles demonstrated a single and non-sustained run of positive sharp waves at the T9 level and was otherwise normal. Fasciculation potentials were not noted in any muscle studied.     Interpretation:  This is an abnormal study, demonstrating electrophysiologic evidence of the following:    Complex electrophysiologic findings that may reflect severe, longstanding bilateral lumbosacral and left cervical radiculopathies, most notably at bilateral S1, bilateral L5, and left C6 levels. See  comment.  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  Moderate left median neuropathy at the wrist.  Possible mild left ulnar neuropathy at the elbow.      Comment:  Because of the presence of milder abnormalities at adjacent levels, a widespread anterior myelopathy or disorder of motor neurons is not excluded, but may be less likely given the presence of documented spinal structural disease, the relative prominence of cervical findings to the C6 level, the absence of definite thoracic paraspinal abnormalities, and the absence of fasciculation potentials. Correlation with clinical and, if indicated, imaging and laboratory findings will be helpful.     ____________________________        Nerve Conduction Studies  Motor Sites      Latency Amplitude Neg. Amp Diff Segment Distance Velocity Neg. Dur Neg Area Diff Temperature Comment   Site (ms) Norm (mV) Norm (%)  cm m/s Norm (ms) (%) ( C)    Left Dp Branch Fibular (TA) Motor   Fib Head 3.6  < 6.0 0.20 -      8.3  28.8 9.5 cm   Pop Fossa 7.6  < 5.7 0.09 - -55 Pop Fossa-Fib Head 11.5 29 - 10.1 -37 28.8 moved G1   Right Dp Branch Fibular (TA) Motor   Fib Head 3.7  < 6.0 1.47 -      8.9  30.5 10.2 cm   Pop Fossa 5.5  < 5.7 1.53 - 4 Pop Fossa-Fib Head 9.5 53 - 9.5 8 30.4    Left Fibular (EDB) Motor   Ankle NR  < 6.0 NR  > 2.0  Ankle-EDB 8   NR  29.6 moved G1   Right Fibular (EDB) Motor   Ankle NR  < 6.0 NR  > 2.0  Ankle-EDB 8   NR  30.5 moved G1   Bel Fib Head NR - NR - NR Bel Fib Head-Ankle - NR  > 38 NR NR 30.4    Pop Fossa NR - NR - NR Pop Fossa-Bel Fib Head - NR  > 38 NR NR 30.4    Left Median (APB) Motor   Wrist 5.2  < 4.4 5.0  > 5.0  Wrist-APB 8   6.2  33.2 moved G1   Elbow 9.7 - 4.0  > 5.0 -20 Elbow-Wrist 23 51  > 48 7.4 -14 32.5    Arm 11.3 - 3.8  > 5.0 -5 Arm-Elbow 10 63 - 7.4 -6 33    Left Tibial (AHB) Motor   Ankle 8.8  < 6.5 0.19  > 5.0  Ankle-AHB 5.8   7.5  29 moved G1   Knee 21.7 - 0.05 - -74 Knee-Ankle 44.2 34  > 38 8.3 -84 28.7    Right  Tibial (AHB) Motor   Ankle 8.2  < 6.5 0.09  > 5.0  Ankle-AHB 8   4.2  30.5 moved G1   Knee 20.7 - 0.07 - -22 Knee-Ankle 45.8 37  > 38 2.8 -50 30.5    Left Ulnar (ADM) Motor   Wrist 3.2  < 3.5 6.0  > 5.0  Wrist-ADM 8   6.0  30.7    Bel Elbow 7.9 - 4.9 - -18 Bel Elbow-Wrist 23.2 49  > 48 6.8 -10 30.5    Abv Elbow 10.0 - 4.7 - -4 Abv Elbow-Bel Elbow 9.6 46  > 48 6.9 -1 30.4      F-Wave Sites      Min F-Lat Max-Min F-Lat Mean F-Lat   Site (ms) (ms) (ms)   Left Median F-Wave   Wrist 34.4 - 34.4   Left Ulnar F-Wave   Wrist 32.4 3.4 34.5     Sensory Sites      Onset Lat Peak Lat Amp (O-P) Amp (P-P) Segment Distance Velocity Temperature Comment   Site ms (ms)  V Norm ( V)  cm m/s Norm ( C)    Left Median Sensory   Wrist-Dig II 3.9 5.2 9  > 10 13 Wrist-Dig II 14 36  > 48 31.4    Left Radial Sensory   Forearm-Wrist 1.88 2.6 9  > 15 12 Forearm-Wrist 10 53 - 30.8 moved G1   Left Sural Sensory   Calf-Lat Mall (n) 3.4 4.5 2  > 5 2 Calf-Lat Mall 14 41  > 38 27.9    Right Sural Sensory   Calf-Lat Mall (n) 3.3 4.0 2  > 5 2 Calf-Lat Mall 12.9 39  > 38 30    Left Ulnar Sensory   Wrist-Dig V 2.6 3.8 13  > 8 24 Wrist-Dig V 12.5 48  > 48 31.9        Electromyography     Side Muscle Ins Act Fibs/PSW Fasc HF Amp Dur Poly Recrt Int Pat   Left Pronator Teres Nml 3+ Nml 0 1+ Nml 2+ SevRed Nml   Left Biceps Nml 2+ Nml 0 Nml Nml 2+ Sergei Nml   Left EDC Nml 1+ Nml 0 Nml Nml 0 Nml Nml   Left FDI Nml 1+ Nml 0 Nml Nml 0 Nml Nml   Left FPL Nml 1+ Nml 0 Nml Nml 0 Nml Nml   Left Brachiorad Nml 2+ Nml 0 Nml Nml 0 Nml Nml   Left Deltoid Nml None Nml 0 Nml Nml 0 Sergei Nml   Left Triceps LongHd Nml 1+ Nml 0 Nml Nml 0 Nml Nml   Left C6 Parasp Nml 2+ Nml 0        Left Gluteus Med Nml 1+ Nml 0 1+ 1+ 0 ModRed Nml   Left Vastus Lat Nml 2+ Nml 0 2+ 2+ 0 SevRed Nml   Left Tib Anterior Decr None Nml 0 Nml Nml 0 None Nml   Left Gastroc MH Nml 1+ Nml 0 2- 2- 0 Discrete Nml   Left Biceps Fem SH Nml 3+ Nml CRD 2+ 2+ 0 SevRed Nml   Right Tib Anterior Nml None Nml  CRD 2+ 2+ 0 SevRed Nml   Right Gastroc MH Nml 2+ Nml 0 Nml Nml 2+ SevRed Nml   Right Biceps Fem SH Nml 2+ Nml CRD 1+ 1+ 0 ModRed Nml   Right Gluteus Med Nml None Nml 0 1+ 1+ 0 Nml Nml   Right T6 Parasp Nml None Nml 0        Right T9 Parasp Incr None Nml 0              NCS Waveforms:    Motor                           Sensory                  F-Wave           Results discussed briefly after the study. Will obtain CK, NfL, cervical MRI, and spine second opinion. Will likely need lumbar MRI with and without contrast as previously ordered but not completed.         Again, thank you for allowing me to participate in the care of your patient.      Sincerely,    Kashmir Torres MD

## 2024-05-29 NOTE — PROGRESS NOTES
Results discussed briefly after the study. Will obtain CK, NfL, cervical MRI, and spine second opinion. Will likely need lumbar MRI with and without contrast as previously ordered but not completed.     Kashmir Torres M.D.

## 2024-05-31 LAB — MAYO MISC RESULT: NORMAL

## 2024-06-06 ENCOUNTER — MYC MEDICAL ADVICE (OUTPATIENT)
Dept: NEUROLOGY | Facility: CLINIC | Age: 64
End: 2024-06-06
Payer: COMMERCIAL

## 2024-06-06 DIAGNOSIS — M54.16 LUMBAR RADICULOPATHY: Primary | ICD-10-CM

## 2024-06-20 ENCOUNTER — ANCILLARY PROCEDURE (OUTPATIENT)
Dept: MRI IMAGING | Facility: CLINIC | Age: 64
End: 2024-06-20
Attending: PSYCHIATRY & NEUROLOGY
Payer: COMMERCIAL

## 2024-06-20 DIAGNOSIS — M54.16 LUMBAR RADICULOPATHY: ICD-10-CM

## 2024-06-20 PROCEDURE — 72148 MRI LUMBAR SPINE W/O DYE: CPT | Mod: GC | Performed by: STUDENT IN AN ORGANIZED HEALTH CARE EDUCATION/TRAINING PROGRAM

## 2024-06-26 NOTE — TELEPHONE ENCOUNTER
SPINE PATIENTS - NEW PROTOCOL PREVISIT    RECORDS RECEIVED FROM: internal   REASON FOR VISIT: Lumbar Radiculopathy    PROVIDER: Dr. Aileen Grace   DATE OF APPT: 7/16/24   NOTES (FOR ALL VISITS) STATUS DETAILS   OFFICE NOTE from referring provider Internal Dr Kashmir Torres @ [facility]:  5/1/24   OFFICE NOTE from other specialist Internal/Rec'd Dr Parth Lam @ [facility] [place] [facility]:  4/26/24    Dr Philip Rico @ [facility]:  12/13/23  10/25/23  8/14/23    Shi Lopez PA @ [facility]:  9/25/23    Tito Jang PA @ [facility]:  7/26/23    OPERATIVE REPORT Internal [facility]:  4/27/23  LEFT LUMBAR 3-4 AND LEFT LUMBAR 4-5 TRANSFORAMINAL LUMBAR INTERBODY FUSION WITH LAMINECTOMY USING STEALTH NAVIGATION    EMG REPORT Internal/Rec'd [facility]:  5/29/24    [facility]:  12/8/23   MEDICATION LIST Internal    IMAGING  (FOR ALL VISITS)     MRI (HEAD, NECK, SPINE) PACS [facility]:  MRI Lumbar Spine 6/20/24    [facility]:  MRI Lumbar Spine 9/15/23  MRI Lumbar Spine 3/15/23   XRAY (SPINE) *NEUROSURGERY* PACS [facility]:  XR Lumbar Spine 5/3/24

## 2024-06-30 DIAGNOSIS — M54.16 LUMBAR RADICULOPATHY: ICD-10-CM

## 2024-07-01 RX ORDER — GABAPENTIN 100 MG/1
200 CAPSULE ORAL 3 TIMES DAILY
Qty: 180 CAPSULE | Refills: 2 | Status: SHIPPED | OUTPATIENT
Start: 2024-07-01 | End: 2024-09-30

## 2024-07-05 ENCOUNTER — TELEPHONE (OUTPATIENT)
Dept: NEUROLOGY | Facility: CLINIC | Age: 64
End: 2024-07-05
Payer: COMMERCIAL

## 2024-07-05 NOTE — TELEPHONE ENCOUNTER
Called patient to help schedule MRI cervical ordered by Dr. Torres. Patient at work and not able to schedule. Pt will call back to schedule. Per pt request Jasonhart sent with imaging phone numbers.    Padmini Whitlock on 7/5/2024 at 10:18 AM

## 2024-07-10 DIAGNOSIS — M54.16 LUMBAR RADICULOPATHY: ICD-10-CM

## 2024-07-10 RX ORDER — IBUPROFEN 800 MG/1
800 TABLET, FILM COATED ORAL EVERY 8 HOURS PRN
Qty: 100 TABLET | Refills: 0 | Status: SHIPPED | OUTPATIENT
Start: 2024-07-10 | End: 2024-08-06

## 2024-07-11 DIAGNOSIS — M54.10 RADICULOPATHY: ICD-10-CM

## 2024-07-11 DIAGNOSIS — M48.061 LUMBAR STENOSIS: Primary | ICD-10-CM

## 2024-07-16 ENCOUNTER — OFFICE VISIT (OUTPATIENT)
Dept: NEUROSURGERY | Facility: CLINIC | Age: 64
End: 2024-07-16
Payer: COMMERCIAL

## 2024-07-16 ENCOUNTER — PRE VISIT (OUTPATIENT)
Dept: NEUROSURGERY | Facility: CLINIC | Age: 64
End: 2024-07-16

## 2024-07-16 ENCOUNTER — ANCILLARY PROCEDURE (OUTPATIENT)
Dept: GENERAL RADIOLOGY | Facility: CLINIC | Age: 64
End: 2024-07-16
Attending: NEUROLOGICAL SURGERY
Payer: COMMERCIAL

## 2024-07-16 VITALS
BODY MASS INDEX: 30.22 KG/M2 | WEIGHT: 228 LBS | HEIGHT: 73 IN | OXYGEN SATURATION: 96 % | SYSTOLIC BLOOD PRESSURE: 149 MMHG | DIASTOLIC BLOOD PRESSURE: 88 MMHG | HEART RATE: 94 BPM

## 2024-07-16 DIAGNOSIS — M54.16 LUMBAR RADICULOPATHY: ICD-10-CM

## 2024-07-16 DIAGNOSIS — M54.10 RADICULOPATHY: ICD-10-CM

## 2024-07-16 DIAGNOSIS — M48.061 LUMBAR STENOSIS: ICD-10-CM

## 2024-07-16 PROCEDURE — 99213 OFFICE O/P EST LOW 20 MIN: CPT | Mod: GC | Performed by: NEUROLOGICAL SURGERY

## 2024-07-16 PROCEDURE — 77073 BONE LENGTH STUDIES: CPT | Performed by: RADIOLOGY

## 2024-07-16 PROCEDURE — 72082 X-RAY EXAM ENTIRE SPI 2/3 VW: CPT | Performed by: RADIOLOGY

## 2024-07-16 ASSESSMENT — PAIN SCALES - GENERAL: PAINLEVEL: MILD PAIN (3)

## 2024-07-16 NOTE — PROGRESS NOTES
"7/16/2024  Neurosurgery Clinic Visit    History of present illness:  Wm Gaines is a 63 year old male with a history of L3-4 and L4-5 decompression and fusion with Dr. Rico in April 2023, presenting for a second opinion regarding his ongoing symptoms following his surgery. Since December, he has been having significant back pain and stiffness in his both feet, which he feels like getting worse everyday. He also has a foot drop since 2021 and his symptoms prior to his initial surgery started as hip and thigh stiffness, which worries him about worsening of his nerves. He was seen by Dr. Torres in Neurology clinic and underwent multiple EMGs, with the last one concerning for possible left C6, bilateral L5 and S1 radiculopathy. His recent lumbar spine MRI showed moderate foraminal stenosis at L2-3 and L5-S1 levels (adjacent levels).     Physical exam:   BP (!) 149/88 (BP Location: Right arm, Patient Position: Sitting, Cuff Size: Adult Regular)   Pulse 94   Ht 1.854 m (6' 1\")   Wt 103.4 kg (228 lb)   SpO2 96%   BMI 30.08 kg/m    General: Awake and alert and in no acute distress.  Pulm: Breathing comfortably on room air  Motor: L PF/DF/EHL 1-2/5, rest of the LLE and RLE 5/5 strength.  Sensation: Diminished sensation over the left foot   Reflexes: 2+ reflexes bilateral biceps, brachioradialis, and patellar tendons. Schreiber's reflex negative. Bilateral clonus negative.     Imaging:  MR Lumbar Spine w/o Contrast    Result Date: 6/20/2024  EXAM: MR LUMBAR SPINE W/O CONTRAST  6/20/2024 1:31 PM HISTORY: Lumbar radiculopathy   COMPARISON: Lumbar spine 5/3/2024, lumbar spine MRI 3/15/2023. TECHNIQUE: Multiplanar, multisequence MR images of the lumbar spine were obtained without intravenous contrast.. CONTRAST: None. FINDINGS: There are 5 lumbar type vertebrae. No signal changes suggesting acute fracture or traumatic subluxation. . Conus tip at approximately L1. Normal cauda equina. Nonfocal extraspinal structures. " Postoperative changes of posterior lateral lumbar fusion from L3 to L5. Bilateral transpedicular screws with interconnecting rods. Intervertebral disc spacer at L3-4 and L4-5. Laminectomy from L3-L4 and L4-L5. Multilevel disc desiccation at L1-L2, L2-L3 and L5-S1. Intervertebral disc height loss most significant at L5-S1. Mild bone marrow edema in the posterior lower endplate of L2, likely degenerative in nature. On a level by level basis, the findings are as follows: T12-L1: No degenerative disc or facet disease. No significant spinal canal or neural foraminal stenosis. L1-L2: Disc bulge with mild bilateral foraminal narrowing. No spinal canal narrowing. L2-L3: Posterior disc osteophyte complex and facet hypertrophy. Bilateral moderate neural foraminal stenosis. Posterior epidural lipomatosis, continued in mild to moderate spinal canal stenosis. L3-L4: Postoperative changes of posterior lateral lumbar fusion. Decompressed. No significant foraminal stenosis L4-L5: Postoperative changes of posterior lateral lumbar fusion. Decompressed.  No significant foraminal stenosis L5-S1: Postoperative changes of posterior lateral lumbar fusion. Posterior disc osteophyte complex. Bilateral moderate neural foraminal narrowing . No significant spinal canal stenosis. Visualized soft tissues are unremarkable.     IMPRESSION: 1. Postoperative changes of L3-L5 posterolateral fusion with decompressive laminectomies at L3-L4 and L4-5. 2. Multilevel lumbar spondylosis as detailed above. I have personally reviewed the examination and initial interpretation and I agree with the findings. ALMA BISHOP MD   SYSTEM ID:  [id number]      Assessment:  # Lumbar spinal stenosis s/p L3-4 and L4-5 decompression and fusion on 04/2023    63 year old male with a history of L3-4 and L4-5 decompression and fusion with Dr. Rico in April 2023, presenting for a second opinion regarding his ongoing symptoms following his surgery. Baseline strength on  examination. There is moderate foraminal stenosis at adjacent levels at L2-3 and L5-S1 on MRI with potential L5 nerve impingement; however S1 nerve roots are not compressed. In addition, patient's symptoms (back pain, not radiating to the leg, bilateral feet stiffness) are not consistent with L5 or S1 radiculopathy. On MRI, L3-5 fusion appears to be successful; however, we do not have a CT scan of the lumbar spine to appropriately assess fusion status. He is having minimal neck symptoms at this point with unremarkable exam, but we also do not have a cervical spine MRI to assess whether the symptoms and possible C6 radiculopathy seen on EMG is consistent with any imaging findings at this time.      Plan:  - Cervical spine MRI without contrast   - CT of the lumbar spine to assess fusion   - We will review the results and reach out for the next steps    Patient seen and discussed with Dr. Aileen Grace MD.  Approximately 30 minutes were spent in chart and image review, evaluation of the patient and assessment of next steps.    Hitesh Winter MD   PGY-1, Department of Neurosurgery  [facility]    REVIEWED ASSOCIATED CLINICAL DATA AND HISTORY FOUND IN THE MEDICAL RECORD:  Past Medical History:   Past Medical History:   Diagnosis Date    Arthritis      Surgical History:   Past Surgical History:   Procedure Laterality Date    ARTHROSCOPY SHOULDER ROTATOR CUFF REPAIR Right 11/01/2019    FUSION TRANSFORAMINAL LUMBAR INTERBODY, 2 LEVELS, POSTERIOR APPROACH, USING OTS IMAGING GUIDANCE Left 4/27/2023    Procedure: LEFT LUMBAR 3-4 AND LEFT LUMBAR 4-5 TRANSFORAMINAL LUMBAR INTERBODY FUSION WITH LAMINECTOMY USING STEALTH NAVIGATION;  Surgeon: Philip Rico MD;  Location: [facility] OR    JOINT REPLACEMENT Left 02/06/2019    TKA    SOFT TISSUE SURGERY  11/22/2019    Rotator cuff surgery left shoulder    ZZC TOTAL HIP ARTHROPLASTY Right 09/18/2018    Procedure: RIGHT TOTAL HIP  ARTHROPLASTY, POSTERIOR APPROACH;  Surgeon: Gerard Evans MD;  Location: [facility] OR;  Service: Orthopedics    [facility] TOTAL HIP ARTHROPLASTY Left 10/02/2019    Procedure: LEFT DIRECT ANTERIOR TOTAL HIP ARTHROPLASTY;  Surgeon: Gerard Evans MD;  Location: [facility] OR;  Service: Orthopedics    [facility] TOTAL KNEE ARTHROPLASTY Left 2019    Procedure: LEFT TOTAL KNEE ARTHROPLASTY;  Surgeon: Gerard Evans MD;  Location: [facility] OR;  Service: Orthopedics    [facility] TOTAL KNEE ARTHROPLASTY Right 2020    Procedure: RIGHT TOTAL KNEE ARTHROPLASTY;  Surgeon: Gerard Evans MD;  Location: [facility] OR;  Service: Orthopedics     Social history:   Social History     Tobacco Use    Smoking status: Every Day     Current packs/day: 0.00     Average packs/day: 2.0 packs/day for 43.6 years (87.3 ttl pk-yrs)     Types: Cigarettes     Start date: 1980     Last attempt to quit: 2024     Years since quittin.4    Smokeless tobacco: Never   Vaping Use    Vaping status: Never Used   Substance Use Topics    Alcohol use: Not Currently    Drug use: No     Family history:   No family history on file.    Medications:  Current Outpatient Medications   Medication Sig Dispense Refill    ACETAMINOPHEN EXTRA STRENGTH 500 MG tablet Take 1 to 2 tablets by mouth three times daily as needed for mild pain. 540 tablet 1    gabapentin (NEURONTIN) 100 MG capsule Take 2 capsules by mouth three times daily. 180 capsule 2    ibuprofen (ADVIL/MOTRIN) 800 MG tablet Take 1 tablet by mouth every 8 hours as needed for moderate pain. 100 tablet 0     No current facility-administered medications for this visit.     Allergies:   No Known Allergies  I have seen this patient with the resident and formulated a plan and agree with this note.  AMP

## 2024-07-16 NOTE — LETTER
"7/16/2024       RE: Wm Gaines  [address]  [place] [address]     Dear Colleague,    Thank you for referring your patient, Wm Gaines, to the [facility] NEUROSURGERY CLINIC [place] at [facility]. Please see a copy of my visit note below.    7/16/2024  Neurosurgery Clinic Visit    History of present illness:  Wm Gaines is a 63 year old male with a history of L3-4 and L4-5 decompression and fusion with Dr. Rico in April 2023, presenting for a second opinion regarding his ongoing symptoms following his surgery. Since December, he has been having significant back pain and stiffness in his both feet, which he feels like getting worse everyday. He also has a foot drop since 2021 and his symptoms prior to his initial surgery started as hip and thigh stiffness, which worries him about worsening of his nerves. He was seen by Dr. Torres in Neurology clinic and underwent multiple EMGs, with the last one concerning for possible left C6, bilateral L5 and S1 radiculopathy. His recent lumbar spine MRI showed moderate foraminal stenosis at L2-3 and L5-S1 levels (adjacent levels).     Physical exam:   BP (!) 149/88 (BP Location: Right arm, Patient Position: Sitting, Cuff Size: Adult Regular)   Pulse 94   Ht 1.854 m (6' 1\")   Wt 103.4 kg (228 lb)   SpO2 96%   BMI 30.08 kg/m    General: Awake and alert and in no acute distress.  Pulm: Breathing comfortably on room air  Motor: L PF/DF/EHL 1-2/5, rest of the LLE and RLE 5/5 strength.  Sensation: Diminished sensation over the left foot   Reflexes: 2+ reflexes bilateral biceps, brachioradialis, and patellar tendons. Schreiber's reflex negative. Bilateral clonus negative.     Imaging:  MR Lumbar Spine w/o Contrast    Result Date: 6/20/2024  EXAM: MR LUMBAR SPINE W/O CONTRAST  6/20/2024 1:31 PM HISTORY: Lumbar radiculopathy   COMPARISON: Lumbar spine 5/3/2024, lumbar spine MRI 3/15/2023. " TECHNIQUE: Multiplanar, multisequence MR images of the lumbar spine were obtained without intravenous contrast.. CONTRAST: None. FINDINGS: There are 5 lumbar type vertebrae. No signal changes suggesting acute fracture or traumatic subluxation. . Conus tip at approximately L1. Normal cauda equina. Nonfocal extraspinal structures. Postoperative changes of posterior lateral lumbar fusion from L3 to L5. Bilateral transpedicular screws with interconnecting rods. Intervertebral disc spacer at L3-4 and L4-5. Laminectomy from L3-L4 and L4-L5. Multilevel disc desiccation at L1-L2, L2-L3 and L5-S1. Intervertebral disc height loss most significant at L5-S1. Mild bone marrow edema in the posterior lower endplate of L2, likely degenerative in nature. On a level by level basis, the findings are as follows: T12-L1: No degenerative disc or facet disease. No significant spinal canal or neural foraminal stenosis. L1-L2: Disc bulge with mild bilateral foraminal narrowing. No spinal canal narrowing. L2-L3: Posterior disc osteophyte complex and facet hypertrophy. Bilateral moderate neural foraminal stenosis. Posterior epidural lipomatosis, continued in mild to moderate spinal canal stenosis. L3-L4: Postoperative changes of posterior lateral lumbar fusion. Decompressed. No significant foraminal stenosis L4-L5: Postoperative changes of posterior lateral lumbar fusion. Decompressed.  No significant foraminal stenosis L5-S1: Postoperative changes of posterior lateral lumbar fusion. Posterior disc osteophyte complex. Bilateral moderate neural foraminal narrowing . No significant spinal canal stenosis. Visualized soft tissues are unremarkable.     IMPRESSION: 1. Postoperative changes of L3-L5 posterolateral fusion with decompressive laminectomies at L3-L4 and L4-5. 2. Multilevel lumbar spondylosis as detailed above. I have personally reviewed the examination and initial interpretation and I agree with the findings. ALMA BISHOP MD   SYSTEM  ID:  [id number]      Assessment:  # Lumbar spinal stenosis s/p L3-4 and L4-5 decompression and fusion on 04/2023    63 year old male with a history of L3-4 and L4-5 decompression and fusion with Dr. Rico in April 2023, presenting for a second opinion regarding his ongoing symptoms following his surgery. Baseline strength on examination. There is moderate foraminal stenosis at adjacent levels at L2-3 and L5-S1 on MRI with potential L5 nerve impingement; however S1 nerve roots are not compressed. In addition, patient's symptoms (back pain, not radiating to the leg, bilateral feet stiffness) are not consistent with L5 or S1 radiculopathy. On MRI, L3-5 fusion appears to be successful; however, we do not have a CT scan of the lumbar spine to appropriately assess fusion status. He is having minimal neck symptoms at this point with unremarkable exam, but we also do not have a cervical spine MRI to assess whether the symptoms and possible C6 radiculopathy seen on EMG is consistent with any imaging findings at this time.      Plan:  - Cervical spine MRI without contrast   - CT of the lumbar spine to assess fusion   - We will review the results and reach out for the next steps    Patient seen and discussed with Dr. Aileen Grace MD.  Approximately 30 minutes were spent in chart and image review, evaluation of the patient and assessment of next steps.    Hitesh Winter MD   PGY-1, Department of Neurosurgery  [facility]    REVIEWED ASSOCIATED CLINICAL DATA AND HISTORY FOUND IN THE MEDICAL RECORD:  Past Medical History:   Past Medical History:   Diagnosis Date    Arthritis      Surgical History:   Past Surgical History:   Procedure Laterality Date    ARTHROSCOPY SHOULDER ROTATOR CUFF REPAIR Right 11/01/2019    FUSION TRANSFORAMINAL LUMBAR INTERBODY, 2 LEVELS, POSTERIOR APPROACH, USING OTS IMAGING GUIDANCE Left 4/27/2023    Procedure: LEFT LUMBAR 3-4 AND LEFT LUMBAR 4-5 TRANSFORAMINAL LUMBAR  INTERBODY FUSION WITH LAMINECTOMY USING STEALTH NAVIGATION;  Surgeon: Philip Rico MD;  Location: [facility] Main OR    JOINT REPLACEMENT Left 2019    TKA    SOFT TISSUE SURGERY  2019    Rotator cuff surgery left shoulder    [facility] TOTAL HIP ARTHROPLASTY Right 2018    Procedure: RIGHT TOTAL HIP ARTHROPLASTY, POSTERIOR APPROACH;  Surgeon: Gerard Evans MD;  Location: [facility] Main OR;  Service: Orthopedics    [facility] TOTAL HIP ARTHROPLASTY Left 10/02/2019    Procedure: LEFT DIRECT ANTERIOR TOTAL HIP ARTHROPLASTY;  Surgeon: Gerard Evans MD;  Location: [facility] Main OR;  Service: Orthopedics    [facility] TOTAL KNEE ARTHROPLASTY Left 2019    Procedure: LEFT TOTAL KNEE ARTHROPLASTY;  Surgeon: Gerard Evans MD;  Location: [facility] Main OR;  Service: Orthopedics    [facility] TOTAL KNEE ARTHROPLASTY Right 2020    Procedure: RIGHT TOTAL KNEE ARTHROPLASTY;  Surgeon: Gerard Evans MD;  Location: [facility] Main OR;  Service: Orthopedics     Social history:   Social History     Tobacco Use    Smoking status: Every Day     Current packs/day: 0.00     Average packs/day: 2.0 packs/day for 43.6 years (87.3 ttl pk-yrs)     Types: Cigarettes     Start date: 1980     Last attempt to quit: 2024     Years since quittin.4    Smokeless tobacco: Never   Vaping Use    Vaping status: Never Used   Substance Use Topics    Alcohol use: Not Currently    Drug use: No     Family history:   No family history on file.    Medications:  Current Outpatient Medications   Medication Sig Dispense Refill    ACETAMINOPHEN EXTRA STRENGTH 500 MG tablet Take 1 to 2 tablets by mouth three times daily as needed for mild pain. 540 tablet 1    gabapentin (NEURONTIN) 100 MG capsule Take 2 capsules by mouth three times daily. 180 capsule 2    ibuprofen (ADVIL/MOTRIN) 800 MG tablet Take 1 tablet by mouth every 8 hours as needed for moderate pain. 100 tablet 0     No current facility-administered medications for this visit.      Allergies:   No Known Allergies  I have seen this patient with the resident and formulated a plan and agree with this note.  AMP    Again, thank you for allowing me to participate in the care of your patient.      Sincerely,    Aileen Grace MD

## 2024-07-16 NOTE — PATIENT INSTRUCTIONS
Please continue your follow-up with Dr Torres.  Dr Grace did order a CT scan of your lumbar spine to evaluate the previous surgery and hardware.   Please let us know when that is completed and with will call with results and plan moving forward.

## 2024-07-24 ENCOUNTER — MYC MEDICAL ADVICE (OUTPATIENT)
Dept: NEUROLOGY | Facility: CLINIC | Age: 64
End: 2024-07-24
Payer: COMMERCIAL

## 2024-07-24 DIAGNOSIS — M54.16 LUMBAR RADICULOPATHY: Primary | ICD-10-CM

## 2024-07-26 ENCOUNTER — HOSPITAL ENCOUNTER (OUTPATIENT)
Dept: MRI IMAGING | Facility: CLINIC | Age: 64
Discharge: HOME | End: 2024-07-26
Attending: PSYCHIATRY & NEUROLOGY
Payer: COMMERCIAL

## 2024-07-26 ENCOUNTER — HOSPITAL ENCOUNTER (OUTPATIENT)
Dept: CT IMAGING | Facility: CLINIC | Age: 64
Discharge: HOME | End: 2024-07-26
Attending: NEUROLOGICAL SURGERY
Payer: COMMERCIAL

## 2024-07-26 DIAGNOSIS — M54.16 LUMBAR RADICULOPATHY: ICD-10-CM

## 2024-07-26 DIAGNOSIS — M54.12 CERVICAL RADICULOPATHY: ICD-10-CM

## 2024-07-26 PROCEDURE — 72141 MRI NECK SPINE W/O DYE: CPT

## 2024-07-26 PROCEDURE — 72131 CT LUMBAR SPINE W/O DYE: CPT

## 2024-07-29 ENCOUNTER — TELEPHONE (OUTPATIENT)
Dept: NEUROLOGY | Facility: CLINIC | Age: 64
End: 2024-07-29
Payer: COMMERCIAL

## 2024-07-29 NOTE — TELEPHONE ENCOUNTER
Left Voicemail (1st Attempt) and Sent Mychart (1st Attempt) for the patient to call back and schedule the following:    Appointment type: Return Subspecialty Neuropathy  Provider: Brian  Return date: next available  Specialty phone number: [phone]  Additional appointment(s) needed:   Additonal Notes: follow up    Padmini Whitlock on 7/29/2024 at 10:50 AM

## 2024-07-30 ENCOUNTER — TELEPHONE (OUTPATIENT)
Dept: NEUROSURGERY | Facility: CLINIC | Age: 64
End: 2024-07-30
Payer: COMMERCIAL

## 2024-07-30 NOTE — TELEPHONE ENCOUNTER
Left Voicemail (1st Attempt) for the patient to call back and schedule the following:    Appointment type: Rtn surg spine - in person or virtual  Provider: Dr. Grace  Return date: Next available  Specialty phone number: [phone]  Additional appointment(s) needed: N/A  Additonal Notes: Review imaging results

## 2024-08-06 ENCOUNTER — MYC MEDICAL ADVICE (OUTPATIENT)
Dept: NEUROLOGY | Facility: CLINIC | Age: 64
End: 2024-08-06
Payer: COMMERCIAL

## 2024-08-06 DIAGNOSIS — M54.16 LUMBAR RADICULOPATHY: ICD-10-CM

## 2024-08-06 RX ORDER — IBUPROFEN 800 MG/1
800 TABLET, FILM COATED ORAL EVERY 8 HOURS PRN
Qty: 100 TABLET | Refills: 0 | Status: SHIPPED | OUTPATIENT
Start: 2024-08-06 | End: 2024-09-03

## 2024-08-09 NOTE — TELEPHONE ENCOUNTER
Leyda Trevino,    I just called and left him a voicemail of the appointment scheduled on his behalf for 8/27/2024 at the [facility]. I just happened to have a spot available so it worked out perfect.    Please let me know if I can be of further assistance. Thank you for your message!!    Lesia

## 2024-08-13 ENCOUNTER — OFFICE VISIT (OUTPATIENT)
Dept: NEUROSURGERY | Facility: CLINIC | Age: 64
End: 2024-08-13
Attending: NEUROLOGICAL SURGERY
Payer: COMMERCIAL

## 2024-08-13 VITALS
HEIGHT: 73 IN | BODY MASS INDEX: 30.27 KG/M2 | DIASTOLIC BLOOD PRESSURE: 83 MMHG | SYSTOLIC BLOOD PRESSURE: 126 MMHG | OXYGEN SATURATION: 96 % | HEART RATE: 87 BPM | WEIGHT: 228.4 LBS

## 2024-08-13 DIAGNOSIS — M54.16 LUMBAR RADICULOPATHY: ICD-10-CM

## 2024-08-13 PROCEDURE — 99213 OFFICE O/P EST LOW 20 MIN: CPT | Mod: GC | Performed by: NEUROLOGICAL SURGERY

## 2024-08-13 ASSESSMENT — PAIN SCALES - GENERAL: PAINLEVEL: NO PAIN (0)

## 2024-08-13 NOTE — PROGRESS NOTES
8/13/2024  Neurosurgery Clinic Visit    Chief complaint: Return for review of imaging    History of present illness:  Wm Gaines is a 63 year old male with a history of L3-4 and L4-5 decompression and fusion with Dr. Rico in April 2023, presenting for a second opinion regarding his ongoing symptoms following his surgery. Since December, he has been having significant back pain and stiffness in his both feet, which he feels like getting worse everyday. He also has a foot drop since 2021 and his symptoms prior to his initial surgery started as hip and thigh stiffness, which worries him about worsening of his nerves. He was seen by Dr. Torres in Neurology clinic and underwent multiple EMGs, with the last one concerning for possible left C6, bilateral L5 and S1 radiculopathy. His recent lumbar spine MRI showed moderate foraminal stenosis at L2-3 and L5-S1 levels (adjacent levels).   He was last  seen in clinic on 6/17/2024, at which time it was determined that his symptoms of non-radiating back pain with bilateral feet stiffness, are not consistent with L5 or S1 radiculopathy. The plan was made to obtain CT imaging of his lumbar spine to assess fusion status, and a cervical spine MRI to assess whether the symptoms and possible C6 radiculopathy seen on EMG is consistent with any imaging findings at this time. He now presents for review of results.  Today, patient reports band-like distribution of pain to the lower back, rated 3/10, and non-radiating. He again endorses bilateral numbness to the ankles and dorsum of the foot, with persistent left foot drop, for which he has orthotics in place. No concerns for neck symptoms expressed today.    Medications:  Current Outpatient Medications   Medication Sig Dispense Refill    ACETAMINOPHEN EXTRA STRENGTH 500 MG tablet Take 1 to 2 tablets by mouth three times daily as needed for mild pain. 540 tablet 1    gabapentin (NEURONTIN) 100 MG capsule Take 2 capsules by mouth  "three times daily. 180 capsule 2    ibuprofen (ADVIL/MOTRIN) 800 MG tablet Take 1 tablet by mouth every 8 hours as needed for moderate pain. 100 tablet 0     No current facility-administered medications for this visit.       Allergies:   No Known Allergies    Review of systems: 10 point ROS negative except for as detailed in HPI    Physical exam:   /83 (BP Location: Right arm, Patient Position: Sitting, Cuff Size: Adult Regular)   Pulse 87   Ht 1.854 m (6' 1\")   Wt 103.6 kg (228 lb 6.4 oz)   SpO2 96%   BMI 30.13 kg/m      General: Awake and alert and in no acute distress.  Pulm: Breathing comfortably on room air  CN: Symmetric browlift, smile, tongue protrusion, palate elevation, and sternocleidomastoids. No dysarthria. Extraocular muscles are all intact. Pupils react bilaterally and equally  Motor: No pronator drift. Good muscle bulk throughout.   RUE 5/5  LUE 4+/5 in the deltoids (previous history of rotator cuff injury), otherwise 5/5  Left PF/DF/EHL 1-2/5, rest of the LLE and RLE 5/5 strength.   Sensation: Diminished sensation over the left foot  Gait: Intact tandem gait.  Reflexes: 2+ reflexes bilateral biceps, brachioradialis, and patellar tendons. Schreiber's reflex negative. Babinski reflex negative. Bilateral clonus negative.     Imaging:  EXAM: MR CERVICAL SPINE W/O CONTRAST  LOCATION: [facility]  DATE: 7/26/2024     INDICATION: Neck pain; hx Spine surgery; No suspected hardware failure; New acute radiculopathy; No known automatically detected potential contraindications to imaging  COMPARISON: None.  TECHNIQUE: MRI Cervical Spine without IV contrast.     FINDINGS:   There is minimal degenerative retrolisthesis of C5 upon C6. Alignment of the cervical vertebrae is otherwise normal; however, there is reversal of normal cervical lordosis centered at the C4 level. Vertebral body heights normal. There is Modic 1   degenerative marrow signal change in the opposing endplates at " C5-C6. Marrow signal otherwise normal. No evidence for fracture or pathologic bony lesion of the cervical spine.  No abnormal cord signal. No extraspinal abnormality.     Craniovertebral junction and C1-C2: Normal.     C2-C3: Facet arthropathy bilaterally, uncinate hypertrophy bilaterally and a posterior broad-based disc osteophyte complex. No spinal canal stenosis. Moderate right foraminal stenosis. No left foraminal narrowing.      C3-C4: Facet arthropathy bilaterally, uncinate hypertrophy bilaterally and a posterior broad-based disc osteophyte complex. Mild spinal canal narrowing. Moderate to severe bilateral foraminal stenosis.      C4-C5: Facet arthropathy bilaterally, uncinate hypertrophy bilaterally and a posterior broad-based disc osteophyte complex. Mild spinal canal narrowing. Moderate to severe neural foraminal stenosis bilaterally.      C5-C6: Facet arthropathy bilaterally, uncinate hypertrophy bilaterally and a posterior broad-based disc osteophyte complex with a superimposed moderate-sized posterior broad-based disc herniation (protrusion). Mild spinal canal narrowing with mild   flattening of the anterior surface of the cervical spinal cord. Moderate neural foraminal stenosis bilaterally.      C6-C7: Facet arthropathy bilaterally, uncinate hypertrophy bilaterally and a posterior broad-based disc osteophyte complex. No spinal canal stenosis. Mild left foraminal narrowing. No right foraminal narrowing.      C7-T1: Minimal posterior broad-based disc osteophyte complex. Normal facets. No spinal canal or neural foraminal stenosis.                                                                      IMPRESSION:  1.  Diffuse degenerative change of the cervical spine as detailed above.  2.  Mild spinal canal narrowing at C5-C6 resulting in mild flattening of the anterior surface of the cervical spinal cord. No high-grade spinal canal stenosis of the cervical spine.  3.  Moderate to severe neural foraminal  stenosis on the right at C2-C3, bilaterally at C3-C4, bilaterally at C4-C5 and bilaterally at C5-C6.  4.  Modic 1 degenerative marrow signal change in the opposing endplates at C5-C6.       EXAM: CT LUMBAR SPINE W/O CONTRAST  LOCATION: [facility]  DATE: 7/26/2024     INDICATION: Previous fusion arthrodesis, fusion status; Low back pain; hx Spine surgery; No r o hardware failure; New acute radiculopathy; No known automatically detected potential contraindications to imaging  COMPARISON: MRI lumbar spine 6/20/2024  TECHNIQUE: Routine CT Lumbar Spine without IV contrast. Multiplanar reformats. Dose reduction techniques were used.      FINDINGS:  VERTEBRA:  For purposes of this dictation, there are 5 nonrib-bearing lumbar vertebral bodies with lumbarization of S1 and rudimentary S1-S2 disc space.     Redemonstrated postsurgical changes related to L3-L5 posterior spinal fusion. Alignment appears unchanged. Redemonstrated fracture of the left L3 pedicle screw with lucency along the proximal screw. L4 pedicle screws remain very closely associated with   the superior endplate. Lucency along the periphery of the L3-L4 interbody spacer.     Grade 1 anterolisthesis at L3-L4. Trace retrolisthesis at L5-S1. Vertebral body heights within normal limits. Marked degenerative changes throughout the lumbar spine, better characterized on recent MRI. No acute fracture.     Bilateral sacroiliac degenerative changes.     CANAL/FORAMINA: Spinal canal and foraminal stenosis better characterized on recent MRI. No findings to suggest interval development of high-grade spinal canal stenosis.     PARASPINAL: Paraspinal soft tissue within normal limits.                                                                      IMPRESSION:  1.  Postsurgical changes related to L3-L5 posterior spinal fusion with redemonstrated left L3 pedicle screw fracture and lucency along the periphery of the L3-L4 interbody spacer.  2.   Multilevel degenerative changes throughout the lumbar spine, better characterized on recent MRI.    Assessment:  C-spine Images reviewed, patient has mild cervical spinal stenosis, with no evidence of cord compression, in keeping with asymptomatic picture.  Lumbar spine CT images were reviewed with findings as follows:   - Broken L3 screw, which patient confirms that he is aware of, and was informed of same in April.   - L4-5 appears fused  - L3-4 is unfused, with loose implants and pseudoarthroses.  - No severe neuro foraminal stenosis at L5  - Vacuum effect noted at L2-3 and L5-S1  While there are degenerative changes present, we do not believe that these are the primary cause of his foot drop, and the pattern of numbness is not consistent with imaging findings. Surgical intervention (L2-S1 posterior instrumented fusion) was proposed, however patient was advised that while this could potentially address his back pain, it is unlikely to improve his numbness. Patient acknowledged understanding of the assessment, however is not interested in pursuing surgical intervention at this time, stating that the back pain is not his primary concern.    Plan:  - No further follow up scheduled  - patient advised to contact the clinic with questions or concerns PRN. We will be happy to address and offer advice.    Patient seen and discussed with MD Justyna Draper MD, PGY-1  Department of Neurosurgery  Pager: [phone]    Please contact neurosurgery resident on call with questions.    Dial * * *[phone], enter [phone] when prompted.     I have seen this patient with the resident and formulated a plan and agree with this note.  AMP

## 2024-08-13 NOTE — LETTER
8/13/2024       RE: Wm Gaines  [address]  [place] [address]     Dear Colleague,    Thank you for referring your patient, Wm Gaines, to the [facility] NEUROSURGERY CLINIC [place] at [facility]. Please see a copy of my visit note below.    8/13/2024  Neurosurgery Clinic Visit    Chief complaint: Return for review of imaging    History of present illness:  Wm Gaines is a 63 year old male with a history of L3-4 and L4-5 decompression and fusion with Dr. Rico in April 2023, presenting for a second opinion regarding his ongoing symptoms following his surgery. Since December, he has been having significant back pain and stiffness in his both feet, which he feels like getting worse everyday. He also has a foot drop since 2021 and his symptoms prior to his initial surgery started as hip and thigh stiffness, which worries him about worsening of his nerves. He was seen by Dr. Torres in Neurology clinic and underwent multiple EMGs, with the last one concerning for possible left C6, bilateral L5 and S1 radiculopathy. His recent lumbar spine MRI showed moderate foraminal stenosis at L2-3 and L5-S1 levels (adjacent levels).   He was last  seen in clinic on 6/17/2024, at which time it was determined that his symptoms of non-radiating back pain with bilateral feet stiffness, are not consistent with L5 or S1 radiculopathy. The plan was made to obtain CT imaging of his lumbar spine to assess fusion status, and a cervical spine MRI to assess whether the symptoms and possible C6 radiculopathy seen on EMG is consistent with any imaging findings at this time. He now presents for review of results.  Today, patient reports band-like distribution of pain to the lower back, rated 3/10, and non-radiating. He again endorses bilateral numbness to the ankles and dorsum of the foot, with persistent left foot drop, for which he has orthotics in place. No  "concerns for neck symptoms expressed today.    Medications:  Current Outpatient Medications   Medication Sig Dispense Refill     ACETAMINOPHEN EXTRA STRENGTH 500 MG tablet Take 1 to 2 tablets by mouth three times daily as needed for mild pain. 540 tablet 1     gabapentin (NEURONTIN) 100 MG capsule Take 2 capsules by mouth three times daily. 180 capsule 2     ibuprofen (ADVIL/MOTRIN) 800 MG tablet Take 1 tablet by mouth every 8 hours as needed for moderate pain. 100 tablet 0     No current facility-administered medications for this visit.       Allergies:   No Known Allergies    Review of systems: 10 point ROS negative except for as detailed in HPI    Physical exam:   /83 (BP Location: Right arm, Patient Position: Sitting, Cuff Size: Adult Regular)   Pulse 87   Ht 1.854 m (6' 1\")   Wt 103.6 kg (228 lb 6.4 oz)   SpO2 96%   BMI 30.13 kg/m      General: Awake and alert and in no acute distress.  Pulm: Breathing comfortably on room air  CN: Symmetric browlift, smile, tongue protrusion, palate elevation, and sternocleidomastoids. No dysarthria. Extraocular muscles are all intact. Pupils react bilaterally and equally  Motor: No pronator drift. Good muscle bulk throughout.   RUE 5/5  LUE 4+/5 in the deltoids (previous history of rotator cuff injury), otherwise 5/5  Left PF/DF/EHL 1-2/5, rest of the LLE and RLE 5/5 strength.   Sensation: Diminished sensation over the left foot  Gait: Intact tandem gait.  Reflexes: 2+ reflexes bilateral biceps, brachioradialis, and patellar tendons. Schreiber's reflex negative. Babinski reflex negative. Bilateral clonus negative.     Imaging:  EXAM: MR CERVICAL SPINE W/O CONTRAST  LOCATION: [facility]  DATE: 7/26/2024     INDICATION: Neck pain; hx Spine surgery; No suspected hardware failure; New acute radiculopathy; No known automatically detected potential contraindications to imaging  COMPARISON: None.  TECHNIQUE: MRI Cervical Spine without IV contrast.   "   FINDINGS:   There is minimal degenerative retrolisthesis of C5 upon C6. Alignment of the cervical vertebrae is otherwise normal; however, there is reversal of normal cervical lordosis centered at the C4 level. Vertebral body heights normal. There is Modic 1   degenerative marrow signal change in the opposing endplates at C5-C6. Marrow signal otherwise normal. No evidence for fracture or pathologic bony lesion of the cervical spine.  No abnormal cord signal. No extraspinal abnormality.     Craniovertebral junction and C1-C2: Normal.     C2-C3: Facet arthropathy bilaterally, uncinate hypertrophy bilaterally and a posterior broad-based disc osteophyte complex. No spinal canal stenosis. Moderate right foraminal stenosis. No left foraminal narrowing.      C3-C4: Facet arthropathy bilaterally, uncinate hypertrophy bilaterally and a posterior broad-based disc osteophyte complex. Mild spinal canal narrowing. Moderate to severe bilateral foraminal stenosis.      C4-C5: Facet arthropathy bilaterally, uncinate hypertrophy bilaterally and a posterior broad-based disc osteophyte complex. Mild spinal canal narrowing. Moderate to severe neural foraminal stenosis bilaterally.      C5-C6: Facet arthropathy bilaterally, uncinate hypertrophy bilaterally and a posterior broad-based disc osteophyte complex with a superimposed moderate-sized posterior broad-based disc herniation (protrusion). Mild spinal canal narrowing with mild   flattening of the anterior surface of the cervical spinal cord. Moderate neural foraminal stenosis bilaterally.      C6-C7: Facet arthropathy bilaterally, uncinate hypertrophy bilaterally and a posterior broad-based disc osteophyte complex. No spinal canal stenosis. Mild left foraminal narrowing. No right foraminal narrowing.      C7-T1: Minimal posterior broad-based disc osteophyte complex. Normal facets. No spinal canal or neural foraminal stenosis.                                                                       IMPRESSION:  1.  Diffuse degenerative change of the cervical spine as detailed above.  2.  Mild spinal canal narrowing at C5-C6 resulting in mild flattening of the anterior surface of the cervical spinal cord. No high-grade spinal canal stenosis of the cervical spine.  3.  Moderate to severe neural foraminal stenosis on the right at C2-C3, bilaterally at C3-C4, bilaterally at C4-C5 and bilaterally at C5-C6.  4.  Modic 1 degenerative marrow signal change in the opposing endplates at C5-C6.       EXAM: CT LUMBAR SPINE W/O CONTRAST  LOCATION: [facility]  DATE: 7/26/2024     INDICATION: Previous fusion arthrodesis, fusion status; Low back pain; hx Spine surgery; No r o hardware failure; New acute radiculopathy; No known automatically detected potential contraindications to imaging  COMPARISON: MRI lumbar spine 6/20/2024  TECHNIQUE: Routine CT Lumbar Spine without IV contrast. Multiplanar reformats. Dose reduction techniques were used.      FINDINGS:  VERTEBRA:  For purposes of this dictation, there are 5 nonrib-bearing lumbar vertebral bodies with lumbarization of S1 and rudimentary S1-S2 disc space.     Redemonstrated postsurgical changes related to L3-L5 posterior spinal fusion. Alignment appears unchanged. Redemonstrated fracture of the left L3 pedicle screw with lucency along the proximal screw. L4 pedicle screws remain very closely associated with   the superior endplate. Lucency along the periphery of the L3-L4 interbody spacer.     Grade 1 anterolisthesis at L3-L4. Trace retrolisthesis at L5-S1. Vertebral body heights within normal limits. Marked degenerative changes throughout the lumbar spine, better characterized on recent MRI. No acute fracture.     Bilateral sacroiliac degenerative changes.     CANAL/FORAMINA: Spinal canal and foraminal stenosis better characterized on recent MRI. No findings to suggest interval development of high-grade spinal canal stenosis.      PARASPINAL: Paraspinal soft tissue within normal limits.                                                                      IMPRESSION:  1.  Postsurgical changes related to L3-L5 posterior spinal fusion with redemonstrated left L3 pedicle screw fracture and lucency along the periphery of the L3-L4 interbody spacer.  2.  Multilevel degenerative changes throughout the lumbar spine, better characterized on recent MRI.    Assessment:  C-spine Images reviewed, patient has mild cervical spinal stenosis, with no evidence of cord compression, in keeping with asymptomatic picture.  Lumbar spine CT images were reviewed with findings as follows:   - Broken L3 screw, which patient confirms that he is aware of, and was informed of same in April.   - L4-5 appears fused  - L3-4 is unfused, with loose implants and pseudoarthroses.  - No severe neuro foraminal stenosis at L5  - Vacuum effect noted at L2-3 and L5-S1  While there are degenerative changes present, we do not believe that these are the primary cause of his foot drop, and the pattern of numbness is not consistent with imaging findings. Surgical intervention (L2-S1 posterior instrumented fusion) was proposed, however patient was advised that while this could potentially address his back pain, it is unlikely to improve his numbness. Patient acknowledged understanding of the assessment, however is not interested in pursuing surgical intervention at this time, stating that the back pain is not his primary concern.    Plan:  - No further follow up scheduled  - patient advised to contact the clinic with questions or concerns PRN. We will be happy to address and offer advice.    Patient seen and discussed with MD Justyna Draper MD, PGY-1  Department of Neurosurgery  Pager: [phone]    Please contact neurosurgery resident on call with questions.    Dial * * [phone], enter [phone] when prompted.         Again, thank you for allowing me to participate in the care  of your patient.      Sincerely,    Aileen Grace MD

## 2024-08-27 ENCOUNTER — OFFICE VISIT (OUTPATIENT)
Dept: NEUROLOGY | Facility: CLINIC | Age: 64
End: 2024-08-27
Payer: COMMERCIAL

## 2024-08-27 VITALS
BODY MASS INDEX: 29.82 KG/M2 | HEART RATE: 86 BPM | DIASTOLIC BLOOD PRESSURE: 90 MMHG | WEIGHT: 226 LBS | SYSTOLIC BLOOD PRESSURE: 134 MMHG

## 2024-08-27 DIAGNOSIS — R53.1 WEAKNESS: Primary | ICD-10-CM

## 2024-08-27 PROCEDURE — 99215 OFFICE O/P EST HI 40 MIN: CPT | Performed by: PSYCHIATRY & NEUROLOGY

## 2024-08-27 NOTE — LETTER
8/27/2024      Wm Gaines  [address]  [place] [address]      Dear Colleague,    Thank you for referring your patient, Wm Gaines, to the [facility] NEUROLOGY CLINIC [place]. Please see a copy of my visit note below.    Return visit for 63 year old man with leg weakness. He reports progressive weakness in the feet since last visit, more so on the right because it had more strength previously. Occasional sharp left low back pains. Awakens with numbness in the right 4th and 5th digits and left thumb, index, and middle finger. No falls. Denies, fever, headache, confusion, visual loss, significant dysphagia.      Mental state: Alert, appropriate, speech, language, and thought content normal.      Cranial nerves II-XII: normal.      Sensory examination:       Right Left   Light touch Normal WBNL   Vibration (timed) MM5 MM5   Vibration (Rydell-Seiffer)       Temp       Pin PC MC   Pos       Legend:   MM = medial malleolus, TT = tibial tuberosity, K = patella, MCP = MCP joint  MF = mid-foot, DC = distal calf, MC = mid calf, PC = proximal calf        Motor examination:       Right Left   Shoulder abduction        5 5   Elbow extension 5 5   Elbow flexion 5 5   Wrist extension         5 5   Finger extension 5 5   FDI 5 5   APB 4+ 5   Hip flexion 5 5   Knee flexion 5 5   Knee extension 5 5   Dorsiflexion 4+ 0   Plantar flexion 5 2   A=atrophy    Inversion 5/0  Eversion 5/2    FDP 5/5     Tone reduced left ankle     Reflexes:    Right Left   Biceps 2 0   BRD 0 0   Triceps 2 2   Patellar 0 0   Achilles 0 0   Plantar Flexor Flexor   Clonus Absent Absent      Coordination:  Finger-nose normal.  Heel-shin normal.  RRMs normal.     Gait:  Steppage as well as limp.     Impression and recommendations:  Today we reviewed your test results, physicians' impressions, and examination. I explained the following:    You likely have a neuropathy but it is unlikely to be rapidly progressive or disabling. I  also doubt that you have vasculitis. I would like to continue to see you to assess how this progresses, or if it stabilizes. Return appointment in 3 months, but call or message us if things are getting worse in the interim.  You indicated you will contact Dr Grace's nurse with follow up questions for her.  I can refer you to a non-surgical spine specialist, for their opinion regarding non-surgical treatment options, prognosis, or therapy. You indicated you will think about that and let me know what you decide.  Repeat CPK blood test at your convenience. The result can be influenced by heavy physical activity or muscle injury, so please do not have it done if either have occurred in 2-3 weeks prior.  I have entered an orthotic order.    Kashmir Torres M.D.    54 minutes spent on the date of the encounter on chart review, history and examination, documentation and further activities as noted above.       Again, thank you for allowing me to participate in the care of your patient.        Sincerely,        Kashmir Torres MD

## 2024-08-27 NOTE — Clinical Note
Per Osvaldo, the pt told him that he is needing a foot orthotic order and not an AFO order. Could you please review and sign the pending order and let me know when it is complete? Thanks!  Thanks  Yaquelin

## 2024-08-27 NOTE — PATIENT INSTRUCTIONS
Today we reviewed your test results, physicians' impressions, and examination. I explained the following:    You likely have a neuropathy but it is unlikely to be rapidly progressive or disabling. I also doubt that you have vasculitis. I would like to continue to see you to assess how this progresses, or if it stabilizes. Return appointment in 3 months, but call or message us if things are getting worse in the interim.  You indicated you will contact Dr Grace's nurse with follow up questions for her.  I can refer you to a non-surgical spine specialist, for their opinion regarding non-surgical treatment options, prognosis, or therapy. You indicated you will think about that and let me know what you decide.  Repeat CPK blood test at your convenience. The result can be influenced by heavy physical activity or muscle injury, so please do not have it done if either have occurred in 2-3 weeks prior.  I have entered an orthotic order.

## 2024-08-27 NOTE — PROGRESS NOTES
Return visit for 63 year old man with leg weakness. He reports progressive weakness in the feet since last visit, more so on the right because it had more strength previously. Occasional sharp left low back pains. Awakens with numbness in the right 4th and 5th digits and left thumb, index, and middle finger. No falls. Denies, fever, headache, confusion, visual loss, significant dysphagia.      Mental state: Alert, appropriate, speech, language, and thought content normal.      Cranial nerves II-XII: normal.      Sensory examination:       Right Left   Light touch Normal WBNL   Vibration (timed) MM5 MM5   Vibration (Rydell-Seiffer)       Temp       Pin PC MC   Pos       Legend:   MM = medial malleolus, TT = tibial tuberosity, K = patella, MCP = MCP joint  MF = mid-foot, DC = distal calf, MC = mid calf, PC = proximal calf        Motor examination:       Right Left   Shoulder abduction        5 5   Elbow extension 5 5   Elbow flexion 5 5   Wrist extension         5 5   Finger extension 5 5   FDI 5 5   APB 4+ 5   Hip flexion 5 5   Knee flexion 5 5   Knee extension 5 5   Dorsiflexion 4+ 0   Plantar flexion 5 2   A=atrophy    Inversion 5/0  Eversion 5/2    FDP 5/5     Tone reduced left ankle     Reflexes:    Right Left   Biceps 2 0   BRD 0 0   Triceps 2 2   Patellar 0 0   Achilles 0 0   Plantar Flexor Flexor   Clonus Absent Absent      Coordination:  Finger-nose normal.  Heel-shin normal.  RRMs normal.     Gait:  Steppage as well as limp.     Impression and recommendations:  Today we reviewed your test results, physicians' impressions, and examination. I explained the following:    You likely have a neuropathy but it is unlikely to be rapidly progressive or disabling. I also doubt that you have vasculitis. I would like to continue to see you to assess how this progresses, or if it stabilizes. Return appointment in 3 months, but call or message us if things are getting worse in the interim.  You indicated you will contact  Dr Grace's nurse with follow up questions for her.  I can refer you to a non-surgical spine specialist, for their opinion regarding non-surgical treatment options, prognosis, or therapy. You indicated you will think about that and let me know what you decide.  Repeat CPK blood test at your convenience. The result can be influenced by heavy physical activity or muscle injury, so please do not have it done if either have occurred in 2-3 weeks prior.  I have entered an orthotic order.    Kashmir Torres M.D.    54 minutes spent on the date of the encounter on chart review, history and examination, documentation and further activities as noted above.

## 2024-08-31 DIAGNOSIS — M54.16 LUMBAR RADICULOPATHY: ICD-10-CM

## 2024-09-03 RX ORDER — IBUPROFEN 800 MG/1
800 TABLET, FILM COATED ORAL EVERY 8 HOURS PRN
Qty: 100 TABLET | Refills: 0 | Status: SHIPPED | OUTPATIENT
Start: 2024-09-03

## 2024-09-05 ENCOUNTER — MYC MEDICAL ADVICE (OUTPATIENT)
Dept: NEUROSURGERY | Facility: CLINIC | Age: 64
End: 2024-09-05
Payer: COMMERCIAL

## 2024-09-06 ENCOUNTER — TELEPHONE (OUTPATIENT)
Dept: NEUROSURGERY | Facility: CLINIC | Age: 64
End: 2024-09-06
Payer: COMMERCIAL

## 2024-09-06 NOTE — TELEPHONE ENCOUNTER
Left Voicemail (1st Attempt) for the patient to call back and schedule the following:    Appointment type: Rtn surgical spine - phone  Provider: Dr. Grace  Return date: 09/24/2024  Specialty phone number: [phone]  Additional appointment(s) needed: N/A  Additonal Notes: #[phone]/ Review/answer patient questions

## 2024-09-06 NOTE — TELEPHONE ENCOUNTER
Called patient and scheduled a phone visit w/ Dr. Grace per Fernando Curiel via phone and [employer] message. -KB

## 2024-09-27 ENCOUNTER — PATIENT OUTREACH (OUTPATIENT)
Dept: CARE COORDINATION | Facility: CLINIC | Age: 64
End: 2024-09-27
Payer: COMMERCIAL

## 2024-09-29 DIAGNOSIS — M54.16 LUMBAR RADICULOPATHY: ICD-10-CM

## 2024-09-30 RX ORDER — GABAPENTIN 100 MG/1
200 CAPSULE ORAL 3 TIMES DAILY
Qty: 180 CAPSULE | Refills: 1 | Status: SHIPPED | OUTPATIENT
Start: 2024-09-30

## 2024-10-03 ENCOUNTER — PATIENT OUTREACH (OUTPATIENT)
Dept: CARE COORDINATION | Facility: CLINIC | Age: 64
End: 2024-10-03
Payer: COMMERCIAL

## 2024-10-11 ENCOUNTER — PATIENT OUTREACH (OUTPATIENT)
Dept: CARE COORDINATION | Facility: CLINIC | Age: 64
End: 2024-10-11
Payer: COMMERCIAL

## 2024-12-08 ENCOUNTER — HEALTH MAINTENANCE LETTER (OUTPATIENT)
Age: 64
End: 2024-12-08

## 2025-01-02 ENCOUNTER — LAB (OUTPATIENT)
Dept: LAB | Facility: CLINIC | Age: 65
End: 2025-01-02
Payer: COMMERCIAL

## 2025-01-02 ENCOUNTER — OFFICE VISIT (OUTPATIENT)
Dept: NEUROLOGY | Facility: CLINIC | Age: 65
End: 2025-01-02
Payer: COMMERCIAL

## 2025-01-02 ENCOUNTER — ANCILLARY PROCEDURE (OUTPATIENT)
Dept: ULTRASOUND IMAGING | Facility: CLINIC | Age: 65
End: 2025-01-02
Attending: PSYCHIATRY & NEUROLOGY
Payer: COMMERCIAL

## 2025-01-02 VITALS
DIASTOLIC BLOOD PRESSURE: 93 MMHG | OXYGEN SATURATION: 95 % | WEIGHT: 223.6 LBS | BODY MASS INDEX: 29.5 KG/M2 | HEART RATE: 99 BPM | RESPIRATION RATE: 20 BRPM | SYSTOLIC BLOOD PRESSURE: 134 MMHG

## 2025-01-02 DIAGNOSIS — R53.1 WEAKNESS: ICD-10-CM

## 2025-01-02 DIAGNOSIS — R20.9 PAINFUL AND COLD LOWER EXTREMITY: ICD-10-CM

## 2025-01-02 DIAGNOSIS — M79.606 PAINFUL AND COLD LOWER EXTREMITY: ICD-10-CM

## 2025-01-02 DIAGNOSIS — R22.42 LOCALIZED SWELLING OF LEFT LOWER LEG: Primary | ICD-10-CM

## 2025-01-02 DIAGNOSIS — R22.42 LOCALIZED SWELLING OF LEFT LOWER LEG: ICD-10-CM

## 2025-01-02 LAB — CK SERPL-CCNC: 618 U/L (ref 39–308)

## 2025-01-02 PROCEDURE — 36415 COLL VENOUS BLD VENIPUNCTURE: CPT | Performed by: PATHOLOGY

## 2025-01-02 PROCEDURE — 93971 EXTREMITY STUDY: CPT | Mod: LT | Performed by: RADIOLOGY

## 2025-01-02 PROCEDURE — 82550 ASSAY OF CK (CPK): CPT | Performed by: PATHOLOGY

## 2025-01-02 ASSESSMENT — PAIN SCALES - GENERAL: PAINLEVEL_OUTOF10: MILD PAIN (3)

## 2025-01-02 NOTE — LETTER
1/2/2025       RE: Wm Gaines  [address]  [place] [address]     Dear Colleague,    Thank you for referring your patient, Wm Gaines, to the [facility] NEUROLOGY CLINIC [place] at [facility]. Please see a copy of my visit note below.    Return visit for 64 year old man with degenerative disease of the spine and neuropathy. He reports worsening of balance, foot weakness, heaviness in the legs, and burning pain in the right foot. One mechanical fall.        General physical examination notable for mild to moderate edema in left ankle and calf, with redness but no focal tenderness. Foot is cold but pink, with preserved DP pulse. Mr Eder has noted an impression from his AFO but, when asked, feels the edema might be worse than usual. I have not documented it in prior visit notes.     Mental state: Alert, appropriate, speech, language, and thought content normal.      Cranial nerves II-XII: normal.     Sensory examination:       Right Left   Light touch Normal Normal   Vibration (timed) MM7 MM7   Vibration (Rydell-Seiffer)       Temp       Pin MC PC   Pos       Legend:   MM = medial malleolus, TT = tibial tuberosity, K = patella, MCP = MCP joint  MF = mid-foot, DC = distal calf, MC = mid calf, PC = proximal calf        Motor examination:       Right Left   Shoulder abduction        5 4- (orthopedic)   Elbow extension 5 5   Elbow flexion 5 5   Wrist extension         5 5   Finger extension 5 5   FDI 5 5   APB 4+ 4   Hip flexion 5 5   Knee flexion 5 5   Knee extension 5 5   Dorsiflexion 4+ 0   Plantar flexion 5 5   A=atrophy     Tone normal     Reflexes:    Right Left   Biceps 0 0   BRD 0 0   Triceps 2 2   Patellar 2 2   Achilles 2 2   Plantar Flexor Flexor   Clonus Absent Absent      Coordination:  Finger-nose normal.  Heel-shin normal.  RRMs normal.     Gait:  Trendelenberg and steppage. Using an AFO.      I personally reviewed imaging  reports, prior EDx, and consultations from [facility] and [facility].      Impression and recommendations:  Examination does not demonstrate significant change.  I would like you to have an ultrasound today to be sure there is not a blood clot, which can be medically serious or life-threatening if present.  If negative, we could later check the arterial pressures in the feet.  Repeat CK blood test.  Return visit 3-4 months, sooner if new problems arise.    Kashmir Torres M.D.    45 minutes spent on the date of the encounter on chart review, history and examination, documentation and further activities as noted above.       Again, thank you for allowing me to participate in the care of your patient.      Sincerely,    Kashmir Torres MD

## 2025-01-02 NOTE — PATIENT INSTRUCTIONS
Examination does not demonstrate significant change.  I would like you to have an ultrasound today to be sure there is not a blood clot, which can be medically serious or life-threatening if present.  If negative, we could later check the arterial pressures in the feet.  Repeat CK blood test.  Return visit 3-4 months, sooner if new problems arise.      Please call [facility] @ [phone] for questions or concerns during regular business hours. For a more efficient way to communicate, use [employer] and address the message to your physician. Remember, [employer] is only read during business hours. Do not leave urgent messages on voicemail or [employer]. If situation is urgent, contact the Neurology Clinic @ [phone] and ask to speak to a Triage Nurse or Call 911 or visit an Emergency Department.     Please call your pharmacy if you need a medication refill. They will send us an electronic message.

## 2025-01-02 NOTE — PROGRESS NOTES
Return visit for 64 year old man with degenerative disease of the spine and neuropathy. He reports worsening of balance, foot weakness, heaviness in the legs, and burning pain in the right foot. One mechanical fall.        General physical examination notable for mild to moderate edema in left ankle and calf, with redness but no focal tenderness. Foot is cold but pink, with preserved DP pulse. Mr Eder has noted an impression from his AFO but, when asked, feels the edema might be worse than usual. I have not documented it in prior visit notes.     Mental state: Alert, appropriate, speech, language, and thought content normal.      Cranial nerves II-XII: normal.     Sensory examination:       Right Left   Light touch Normal Normal   Vibration (timed) MM7 MM7   Vibration (Rydell-Seiffer)       Temp       Pin MC PC   Pos       Legend:   MM = medial malleolus, TT = tibial tuberosity, K = patella, MCP = MCP joint  MF = mid-foot, DC = distal calf, MC = mid calf, PC = proximal calf        Motor examination:       Right Left   Shoulder abduction        5 4- (orthopedic)   Elbow extension 5 5   Elbow flexion 5 5   Wrist extension         5 5   Finger extension 5 5   FDI 5 5   APB 4+ 4   Hip flexion 5 5   Knee flexion 5 5   Knee extension 5 5   Dorsiflexion 4+ 0   Plantar flexion 5 5   A=atrophy     Tone normal     Reflexes:    Right Left   Biceps 0 0   BRD 0 0   Triceps 2 2   Patellar 2 2   Achilles 2 2   Plantar Flexor Flexor   Clonus Absent Absent      Coordination:  Finger-nose normal.  Heel-shin normal.  RRMs normal.     Gait:  Trendelenberg and steppage. Using an AFO.      I personally reviewed imaging reports, prior EDx, and consultations from [facility] Atkinson and [facility].      Impression and recommendations:  Examination does not demonstrate significant change.  I would like you to have an ultrasound today to be sure there is not a blood clot, which can be medically serious or life-threatening if present.  If negative, we  could later check the arterial pressures in the feet.  Repeat CK blood test.  Return visit 3-4 months, sooner if new problems arise.    Kashmir Torres M.D.    45 minutes spent on the date of the encounter on chart review, history and examination, documentation and further activities as noted above.

## 2025-01-02 NOTE — NURSING NOTE
Chief Complaint   Patient presents with    RECHECK     BP (!) 134/93 (BP Location: Right arm, Patient Position: Sitting, Cuff Size: Adult Large)   Pulse 99   Resp 20   Wt 101.4 kg (223 lb 9.6 oz)   SpO2 95%   BMI 29.50 kg/m      HALEIGH GIL

## 2025-01-08 DIAGNOSIS — M54.16 LUMBAR RADICULOPATHY: ICD-10-CM

## 2025-01-08 RX ORDER — IBUPROFEN 800 MG/1
800 TABLET, FILM COATED ORAL EVERY 8 HOURS PRN
Qty: 100 TABLET | Refills: 0 | Status: SHIPPED | OUTPATIENT
Start: 2025-01-08

## 2025-01-17 ENCOUNTER — MYC MEDICAL ADVICE (OUTPATIENT)
Dept: NEUROLOGY | Facility: CLINIC | Age: 65
End: 2025-01-17
Payer: COMMERCIAL

## 2025-02-09 SDOH — HEALTH STABILITY: PHYSICAL HEALTH: ON AVERAGE, HOW MANY MINUTES DO YOU ENGAGE IN EXERCISE AT THIS LEVEL?: 0 MIN

## 2025-02-09 SDOH — HEALTH STABILITY: PHYSICAL HEALTH: ON AVERAGE, HOW MANY DAYS PER WEEK DO YOU ENGAGE IN MODERATE TO STRENUOUS EXERCISE (LIKE A BRISK WALK)?: 0 DAYS

## 2025-02-09 ASSESSMENT — SOCIAL DETERMINANTS OF HEALTH (SDOH): HOW OFTEN DO YOU GET TOGETHER WITH FRIENDS OR RELATIVES?: NEVER

## 2025-02-12 ENCOUNTER — TRANSFERRED RECORDS (OUTPATIENT)
Dept: HEALTH INFORMATION MANAGEMENT | Facility: CLINIC | Age: 65
End: 2025-02-12

## 2025-02-12 ENCOUNTER — OFFICE VISIT (OUTPATIENT)
Dept: FAMILY MEDICINE | Facility: CLINIC | Age: 65
End: 2025-02-12
Payer: COMMERCIAL

## 2025-02-12 VITALS
DIASTOLIC BLOOD PRESSURE: 79 MMHG | RESPIRATION RATE: 20 BRPM | HEART RATE: 96 BPM | OXYGEN SATURATION: 95 % | HEIGHT: 73 IN | SYSTOLIC BLOOD PRESSURE: 112 MMHG | WEIGHT: 228 LBS | BODY MASS INDEX: 30.22 KG/M2 | TEMPERATURE: 97 F

## 2025-02-12 DIAGNOSIS — Z12.5 SCREENING FOR PROSTATE CANCER: ICD-10-CM

## 2025-02-12 DIAGNOSIS — Z13.220 LIPID SCREENING: ICD-10-CM

## 2025-02-12 DIAGNOSIS — Z00.00 ADULT GENERAL MEDICAL EXAM: Primary | ICD-10-CM

## 2025-02-12 DIAGNOSIS — G70.9 NEUROMUSCULAR DISEASE (H): ICD-10-CM

## 2025-02-12 DIAGNOSIS — Z00.00 ROUTINE GENERAL MEDICAL EXAMINATION AT A HEALTH CARE FACILITY: ICD-10-CM

## 2025-02-12 DIAGNOSIS — R35.1 BENIGN PROSTATIC HYPERPLASIA WITH NOCTURIA: Primary | ICD-10-CM

## 2025-02-12 DIAGNOSIS — N40.1 BENIGN PROSTATIC HYPERPLASIA WITH NOCTURIA: Primary | ICD-10-CM

## 2025-02-12 DIAGNOSIS — R35.1 BENIGN PROSTATIC HYPERPLASIA WITH NOCTURIA: ICD-10-CM

## 2025-02-12 DIAGNOSIS — G62.9 NEUROPATHY: ICD-10-CM

## 2025-02-12 DIAGNOSIS — N40.1 BENIGN PROSTATIC HYPERPLASIA WITH NOCTURIA: ICD-10-CM

## 2025-02-12 DIAGNOSIS — L98.9 SKIN LESION: ICD-10-CM

## 2025-02-12 LAB
EST. AVERAGE GLUCOSE BLD GHB EST-MCNC: 123 MG/DL
HBA1C MFR BLD: 5.9 % (ref 0–5.6)

## 2025-02-12 PROCEDURE — 84443 ASSAY THYROID STIM HORMONE: CPT | Performed by: FAMILY MEDICINE

## 2025-02-12 PROCEDURE — 36415 COLL VENOUS BLD VENIPUNCTURE: CPT | Performed by: FAMILY MEDICINE

## 2025-02-12 PROCEDURE — 82607 VITAMIN B-12: CPT | Performed by: FAMILY MEDICINE

## 2025-02-12 PROCEDURE — 88305 TISSUE EXAM BY PATHOLOGIST: CPT | Performed by: PATHOLOGY

## 2025-02-12 PROCEDURE — 80061 LIPID PANEL: CPT | Performed by: FAMILY MEDICINE

## 2025-02-12 PROCEDURE — 83036 HEMOGLOBIN GLYCOSYLATED A1C: CPT | Performed by: FAMILY MEDICINE

## 2025-02-12 PROCEDURE — G0103 PSA SCREENING: HCPCS | Performed by: FAMILY MEDICINE

## 2025-02-12 PROCEDURE — 80053 COMPREHEN METABOLIC PANEL: CPT | Performed by: FAMILY MEDICINE

## 2025-02-12 RX ORDER — FINASTERIDE 5 MG/1
5 TABLET, FILM COATED ORAL DAILY
Qty: 90 TABLET | Refills: 1 | Status: SHIPPED | OUTPATIENT
Start: 2025-02-12

## 2025-02-12 NOTE — PATIENT INSTRUCTIONS
Patient Education   Preventive Care Advice   This is general advice given by our system to help you stay healthy. However, your care team may have specific advice just for you. Please talk to your care team about your preventive care needs.  Nutrition  Eat 5 or more servings of fruits and vegetables each day.  Try wheat bread, brown rice and whole grain pasta (instead of white bread, rice, and pasta).  Get enough calcium and vitamin D. Check the label on foods and aim for 100% of the RDA (recommended daily allowance).  Lifestyle  Exercise at least 150 minutes each week  (30 minutes a day, 5 days a week).  Do muscle strengthening activities 2 days a week. These help control your weight and prevent disease.  No smoking.  Wear sunscreen to prevent skin cancer.  Have a dental exam and cleaning every 6 months.  Yearly exams  See your health care team every year to talk about:  Any changes in your health.  Any medicines your care team has prescribed.  Preventive care, family planning, and ways to prevent chronic diseases.  Shots (vaccines)   HPV shots (up to age 26), if you've never had them before.  Hepatitis B shots (up to age 59), if you've never had them before.  COVID-19 shot: Get this shot when it's due.  Flu shot: Get a flu shot every year.  Tetanus shot: Get a tetanus shot every 10 years.  Pneumococcal, hepatitis A, and RSV shots: Ask your care team if you need these based on your risk.  Shingles shot (for age 50 and up)  General health tests  Diabetes screening:  Starting at age 35, Get screened for diabetes at least every 3 years.  If you are younger than age 35, ask your care team if you should be screened for diabetes.  Cholesterol test: At age 39, start having a cholesterol test every 5 years, or more often if advised.  Bone density scan (DEXA): At age 50, ask your care team if you should have this scan for osteoporosis (brittle bones).  Hepatitis C: Get tested at least once in your life.  STIs (sexually  transmitted infections)  Before age 24: Ask your care team if you should be screened for STIs.  After age 24: Get screened for STIs if you're at risk. You are at risk for STIs (including HIV) if:  You are sexually active with more than one person.  You don't use condoms every time.  You or a partner was diagnosed with a sexually transmitted infection.  If you are at risk for HIV, ask about PrEP medicine to prevent HIV.  Get tested for HIV at least once in your life, whether you are at risk for HIV or not.  Cancer screening tests  Cervical cancer screening: If you have a cervix, begin getting regular cervical cancer screening tests starting at age 21.  Breast cancer scan (mammogram): If you've ever had breasts, begin having regular mammograms starting at age 40. This is a scan to check for breast cancer.  Colon cancer screening: It is important to start screening for colon cancer at age 45.  Have a colonoscopy test every 10 years (or more often if you're at risk) Or, ask your provider about stool tests like a FIT test every year or Cologuard test every 3 years.  To learn more about your testing options, visit:   .  For help making a decision, visit:   https://bit.ly/ef98005.  Prostate cancer screening test: If you have a prostate, ask your care team if a prostate cancer screening test (PSA) at age 55 is right for you.  Lung cancer screening: If you are a current or former smoker ages 50 to 80, ask your care team if ongoing lung cancer screenings are right for you.  For informational purposes only. Not to replace the advice of your health care provider. Copyright   2023 [facility] [employer] [facility]. All rights reserved. Clinically reviewed by the [facility]itions Program. [employer] [id number] - REV 01/24.  Preventing Falls: Care Instructions  Injuries and health problems such as trouble walking or poor eyesight can increase your risk of falling. So can some medicines. But there are things you can do to help  "prevent falls. You can exercise to get stronger. You can also arrange your home to make it safer.    Talk to your doctor about the medicines you take. Ask if any of them increase the risk of falls and whether they can be changed or stopped.   Try to exercise regularly. It can help improve your strength and balance. This can help lower your risk of falling.         Practice fall safety and prevention.   Wear low-heeled shoes that fit well and give your feet good support. Talk to your doctor if you have foot problems that make this hard.  Carry a cellphone or wear a medical alert device that you can use to call for help.  Use stepladders instead of chairs to reach high objects. Don't climb if you're at risk for falls. Ask for help, if needed.  Wear the correct eyeglasses, if you need them.        Make your home safer.   Remove rugs, cords, clutter, and furniture from walkways.  Keep your house well lit. Use night-lights in hallways and bathrooms.  Install and use sturdy handrails on stairways.  Wear nonskid footwear, even inside. Don't walk barefoot or in socks without shoes.        Be safe outside.   Use handrails, curb cuts, and ramps whenever possible.  Keep your hands free by using a shoulder bag or backpack.  Try to walk in well-lit areas. Watch out for uneven ground, changes in pavement, and debris.  Be careful in the winter. Walk on the grass or gravel when sidewalks are slippery. Use de-icer on steps and walkways. Add non-slip devices to shoes.    Put grab bars and nonskid mats in your shower or tub and near the toilet. Try to use a shower chair or bath bench when bathing.   Get into a tub or shower by putting in your weaker leg first. Get out with your strong side first. Have a phone or medical alert device in the bathroom with you.   Where can you learn more?  Go to https://www.[employer].net/patiented  Enter G117 in the search box to learn more about \"Preventing Falls: Care Instructions.\"  Current as of: " July 31, 2024  Content Version: 14.3    2024 [employer].   Care instructions adapted under license by your healthcare professional. If you have questions about a medical condition or this instruction, always ask your healthcare professional. [employer] disclaims any warranty or liability for your use of this information.    Learning About Stress  What is stress?     Stress is your body's response to a hard situation. Your body can have a physical, emotional, or mental response. Stress is a fact of life for most people, and it affects everyone differently. What causes stress for you may not be stressful for someone else.  A lot of things can cause stress. You may feel stress when you go on a job interview, take a test, or run a race. This kind of short-term stress is normal and even useful. It can help you if you need to work hard or react quickly. For example, stress can help you finish an important job on time.  Long-term stress is caused by ongoing stressful situations or events. Examples of long-term stress include long-term health problems, ongoing problems at work, or conflicts in your family. Long-term stress can harm your health.  How does stress affect your health?  When you are stressed, your body responds as though you are in danger. It makes hormones that speed up your heart, make you breathe faster, and give you a burst of energy. This is called the fight-or-flight stress response. If the stress is over quickly, your body goes back to normal and no harm is done.  But if stress happens too often or lasts too long, it can have bad effects. Long-term stress can make you more likely to get sick, and it can make symptoms of some diseases worse. If you tense up when you are stressed, you may develop neck, shoulder, or low back pain. Stress is linked to high blood pressure and heart disease.  Stress also harms your emotional health. It can make you shipman, tense, or depressed. Your  relationships may suffer, and you may not do well at work or school.  What can you do to manage stress?  You can try these things to help manage stress:   Do something active. Exercise or activity can help reduce stress. Walking is a great way to get started. Even everyday activities such as housecleaning or yard work can help.  Try yoga or deborah chi. These techniques combine exercise and meditation. You may need some training at first to learn them.  Do something you enjoy. For example, listen to music or go to a movie. Practice your hobby or do volunteer work.  Meditate. This can help you relax, because you are not worrying about what happened before or what may happen in the future.  Do guided imagery. Imagine yourself in any setting that helps you feel calm. You can use online videos, books, or a teacher to guide you.  Do breathing exercises. For example:  From a standing position, bend forward from the waist with your knees slightly bent. Let your arms dangle close to the floor.  Breathe in slowly and deeply as you return to a standing position. Roll up slowly and lift your head last.  Hold your breath for just a few seconds in the standing position.  Breathe out slowly and bend forward from the waist.  Let your feelings out. Talk, laugh, cry, and express anger when you need to. Talking with supportive friends or family, a counselor, or a henry leader about your feelings is a healthy way to relieve stress. Avoid discussing your feelings with people who make you feel worse.  Write. It may help to write about things that are bothering you. This helps you find out how much stress you feel and what is causing it. When you know this, you can find better ways to cope.  What can you do to prevent stress?  You might try some of these things to help prevent stress:  Manage your time. This helps you find time to do the things you want and need to do.  Get enough sleep. Your body recovers from the stresses of the day while  "you are sleeping.  Get support. Your family, friends, and community can make a difference in how you experience stress.  Limit your news feed. Avoid or limit time on social media or news that may make you feel stressed.  Do something active. Exercise or activity can help reduce stress. Walking is a great way to get started.  Where can you learn more?  Go to https://www.[employer].net/patiented  Enter N032 in the search box to learn more about \"Learning About Stress.\"  Current as of: October 24, 2023  Content Version: 14.3    2024 [employer].   Care instructions adapted under license by your healthcare professional. If you have questions about a medical condition or this instruction, always ask your healthcare professional. [employer] disclaims any warranty or liability for your use of this information.       "

## 2025-02-12 NOTE — PROGRESS NOTES
"Preventive Care Visit  [facility]  MARLON K MD SHIKHA, Family Medicine  Feb 12, 2025  {Provider  Link to [facility] :757696}    {PROVIDER CHARTING PREFERENCE:315230}    Zachery Burk is a 64 year old, presenting for the following:  Physical (Want to try to get finasteride for prostate ), Mole (Right wrist it itches ), and Hypertension        2/12/2025     3:21 PM   Additional Questions   Roomed by Risa Sahni neurologist in April - he was concerned that symptoms didn't match the tests  Went to rheumatologist - ruled out the vasculitis  Went to Neurosurgeon (Dr. Lam) - was ready to do surgery - but referred him to neuromuscular -   Dr. Rico did his fusion in past - and then gave original order to go to neuromuscular doctor at   Dr. Lam - referred him to Dr. Torres - symptoms didn't match , \"rerun the tests\"  When they finished his tests - told him it wasn't mononeuritis multiplex  Then met with Dr. Sommers - wasn't agreeing with him  Then back to Dr. Torres - \"I don't necessarily agree that the spine isn't causing this\" - but then said \"mononeuropathy - mild  Wasn't a full effort and was \"shuffled off\"    Dr. YUDI Presley - radiculopathy  Dr. 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 Torres in June -       Had original fusion April 2023  Left foot drop was there before the fusion - started after one of his joint " "replacements (had both hips, both knees - 2021) - foot started slapping the floor -   In September 2022 - went in to check out the left foot drop - felt to be \"permanent -   By January 2023 - had full drop foot - then ordered the AFO brace -   By end of February - glutes and hamstrings were tight - affected his balance - that's when he called the spine doctor   Got the MRI, met with nurse practitioner - \"would usually pursue conservative treatment, but thought it should be done urgently - referred to back surgery)  Then, had back surgery    Has had BPH symptoms forever, weak stream, dripping - up 2-3 times/night   Was losing his hair, checked into Minoxidil - but found Finasteride  Would like to try that     Skin lesion - right ulnar wrist     Trying to get a therapist/counselor  Went on \"my health match\" and got nothing back                 ***  {MA/LPN/RN Pre-Provider Visit Orders- hCG/UA/Strep (Optional):693581}  {SUPERLIST (Optional):223284}  {additonal problems for provider to add (Optional):052278}  Health Care Directive  Patient does not have a Health Care Directive: Discussed advance care planning with patient; however, patient declined at this time.      2/9/2025   General Health   How would you rate your overall physical health? (!) FAIR   Feel stress (tense, anxious, or unable to sleep) Rather much   (!) STRESS CONCERN      2/9/2025   Nutrition   Three or more servings of calcium each day? Yes   Diet: Regular (no restrictions)   How many servings of fruit and vegetables per day? (!) 2-3   How many sweetened beverages each day? (!) 2         2/9/2025   Exercise   Days per week of moderate/strenous exercise 0 days   Average minutes spent exercising at this level 0 min   (!) EXERCISE CONCERN      2/9/2025   Social Factors   Frequency of gathering with friends or relatives Never   Worry food won't last until get money to buy more No   Food not last or not have enough money for food? No   Do you have housing? " (Housing is defined as stable permanent housing and does not include staying ouside in a car, in a tent, in an abandoned building, in an overnight shelter, or couch-surfing.) Yes   Are you worried about losing your housing? No   Lack of transportation? No   Unable to get utilities (heat,electricity)? No   (!) SOCIAL CONNECTIONS CONCERN      2025   Fall Risk   Gait Speed Test (Document in seconds) 5.5   Gait Speed Test Interpretation Greater than 5.01 seconds - ABNORMAL          2025   Dental   Dentist two times every year? (!) NO          {Rooming Staff Patient needs a PHQ as part of the AWV.  Use this link to complete and then refresh the note to pull results Link to PHQ2 Assessment :100496}  {USE TO PULL IN PHQ RESULTS FOR TODAY:597541}      2025   Substance Use   Alcohol more than 3/day or more than 7/wk No   Do you use any other substances recreationally? No     Social History     Tobacco Use     Smoking status: Every Day     Current packs/day: 0.00     Average packs/day: 2.0 packs/day for 43.6 years (87.3 ttl pk-yrs)     Types: Cigarettes     Start date: 1980     Last attempt to quit: 2024     Years since quittin.0     Smokeless tobacco: Never   Vaping Use     Vaping status: Never Used   Substance Use Topics     Alcohol use: Not Currently     Drug use: No     {Provider  If there are gaps in the social history shown above, please follow the link to update and then refresh the note Link to Social and Substance History :859147}      2025   STI Screening   New sexual partner(s) since last STI/HIV test? No   Last PSA:   Prostate Specific Antigen Screen   Date Value Ref Range Status   10/27/2023 1.04 0.00 - 4.50 ng/mL Final   03/15/2021 1.1 0.0 - 3.5 ng/mL Final     ASCVD Risk   The 10-year ASCVD risk score (Leann MARTINEZ, et al., 2019) is: 16.1%    Values used to calculate the score:      Age: 64 years      Sex: Male      Is Non- : No      Diabetic: No      " Tobacco smoker: Yes      Systolic Blood Pressure: 112 mmHg      Is BP treated: No      HDL Cholesterol: 34 mg/dL      Total Cholesterol: 210 mg/dL    {Link to Fracture Risk Assessment Tool (Optional):527728}    {Provider  REQUIRED FOR AWV Use the storyboard to review patient history, after sections have been marked as reviewed, refresh note to capture documentation:947627}   Reviewed and updated as needed this visit by Provider                    {HISTORY OPTIONS (Optional):775452}    {ROS Picklists (Optional):859392}     Objective    Exam  /79 (BP Location: Right arm, Patient Position: Sitting, Cuff Size: Adult Regular)   Pulse 96   Temp 97  F (36.1  C) (Temporal)   Resp 20   Ht 1.854 m (6' 0.99\")   Wt 103.4 kg (228 lb)   SpO2 95%   BMI 30.09 kg/m     Estimated body mass index is 30.09 kg/m  as calculated from the following:    Height as of this encounter: 1.854 m (6' 0.99\").    Weight as of this encounter: 103.4 kg (228 lb).    Physical Exam  {Exam Choices (Optional):695086}        Signed Electronically by: MARLON TRIVEDI MD  {Email feedback regarding this note to [email or web address]   :678445}Prior to immunization administration, verified patients identity using patient s name and date of birth. Please see Immunization Activity for additional information.     Screening Questionnaire for Adult Immunization    Are you sick today?   No   Do you have allergies to medications, food, a vaccine component or latex?   No   Have you ever had a serious reaction after receiving a vaccination?   No   Do you have a long-term health problem with heart, lung, kidney, or metabolic disease (e.g., diabetes), asthma, a blood disorder, no spleen, complement component deficiency, a cochlear implant, or a spinal fluid leak?  Are you on long-term aspirin therapy?   No   Do you have cancer, leukemia, HIV/AIDS, or any other immune system problem?   No   Do you have a parent, " brother, or sister with an immune system problem?   No   In the past 3 months, have you taken medications that affect  your immune system, such as prednisone, other steroids, or anticancer drugs; drugs for the treatment of rheumatoid arthritis, Crohn s disease, or psoriasis; or have you had radiation treatments?   No   Have you had a seizure, or a brain or other nervous system problem?   No   During the past year, have you received a transfusion of blood or blood    products, or been given immune (gamma) globulin or antiviral drug?   No   For women: Are you pregnant or is there a chance you could become       pregnant during the next month?   No   Have you received any vaccinations in the past 4 weeks?   No     Immunization questionnaire answers were all negative.      Patient instructed to remain in clinic for 15 minutes afterwards, and to report any adverse reactions.     Screening performed by Risa Cuellar on 2/12/2025 at 3:27 PM.      02/12/25 103.4 kg (228 lb)   01/02/25 101.4 kg (223 lb 9.6 oz)   08/27/24 102.5 kg (226 lb)                  Patient Active Problem List   Diagnosis    Family history of diabetes mellitus    Personal history of tobacco use, presenting hazards to health    Hip pain, right    Left knee pain    Alcohol use    Primary osteoarthritis of right hip    Primary osteoarthritis of left knee    Primary osteoarthritis of left hip    Primary osteoarthritis of right knee    Status post total right knee replacement    Status post lumbar surgery    Neuromuscular disease (H)     Past Surgical History:   Procedure Laterality Date    ARTHROSCOPY SHOULDER ROTATOR CUFF REPAIR Right 11/01/2019    FUSION TRANSFORAMINAL LUMBAR INTERBODY, 2 LEVELS, POSTERIOR APPROACH, USING OTS IMAGING GUIDANCE Left 4/27/2023    Procedure: LEFT LUMBAR 3-4 AND LEFT LUMBAR 4-5 TRANSFORAMINAL LUMBAR INTERBODY FUSION WITH LAMINECTOMY USING STEALTH NAVIGATION;  Surgeon: Philip Rico MD;  Location: [facility] Main OR    JOINT REPLACEMENT Left 02/06/2019    TKA    SOFT TISSUE SURGERY  11/22/2019    Rotator cuff surgery left shoulder    [facility] TOTAL HIP ARTHROPLASTY Right 09/18/2018    Procedure: RIGHT TOTAL HIP ARTHROPLASTY, POSTERIOR APPROACH;  Surgeon: Gerard Evans MD;  Location: [facility] Main OR;  Service: Orthopedics    [facility] TOTAL HIP ARTHROPLASTY Left 10/02/2019    Procedure: LEFT DIRECT ANTERIOR TOTAL HIP ARTHROPLASTY;  Surgeon: Gerard Evans MD;  Location: [facility] Main OR;  Service: Orthopedics    [facility] TOTAL KNEE ARTHROPLASTY Left 02/06/2019    Procedure: LEFT TOTAL KNEE ARTHROPLASTY;  Surgeon: Gerard Evans MD;  Location: [facility] Main OR;  Service: Orthopedics    [facility] TOTAL KNEE ARTHROPLASTY Right 11/04/2020    Procedure: RIGHT TOTAL KNEE ARTHROPLASTY;  Surgeon: Gerard Evans MD;  Location: [facility] Main OR;  Service: Orthopedics       Social History     Tobacco Use    Smoking status: Every Day     Current packs/day: 0.00     Average  packs/day: 2.0 packs/day for 43.6 years (87.3 ttl pk-yrs)     Types: Cigarettes     Start date: 1980     Last attempt to quit: 2024     Years since quittin.0    Smokeless tobacco: Never   Substance Use Topics    Alcohol use: Not Currently     No family history on file.      Current Outpatient Medications   Medication Sig Dispense Refill    ACETAMINOPHEN EXTRA STRENGTH 500 MG tablet Take 1 to 2 tablets by mouth three times daily as needed for mild pain. 540 tablet 1    finasteride (PROSCAR) 5 MG tablet Take 1 tablet (5 mg) by mouth daily. 90 tablet 1    gabapentin (NEURONTIN) 100 MG capsule Take 2 capsules by mouth three times daily. (Patient not taking: Reported on 2025) 180 capsule 1    ibuprofen (ADVIL/MOTRIN) 800 MG tablet Take 1 tablet by mouth every 8 hours as needed for moderate pain. 100 tablet 0    rosuvastatin (CRESTOR) 10 MG tablet Take 1 tablet (10 mg) by mouth daily. 90 tablet 1     No Known Allergies  Recent Labs   Lab Test 25  1701 24  1039 12/15/23  1005 10/27/23  1014 23  1431 08/15/22  1210 03/15/21  1400 03/15/21  1400 20  1417 10/03/19  0519 09/10/19  0945   A1C 5.9*  --   --  5.6  --  5.7*  --   --   --   --   --    *  --   --  153*  --   --   --   --   --   --  136*   HDL 29*  --   --  34*  --   --   --   --   --   --  35*   TRIG 144  --   --  117  --   --   --   --   --   --  123   ALT 39  --  38 36  --  38  --  41  --   --  24   CR 0.60*  --  0.59* 0.59*   < > 0.65*  --  0.72 0.74   < > 0.73   GFRESTIMATED >90  --  >90 >90   < > >90  --  >60 >60   < > >60   GFRESTBLACK  --   --   --   --   --   --   --  >60 >60   < > >60   POTASSIUM 4.0  --  4.3 4.5   < > 4.8  --  4.7 4.5   < > 4.6   TSH 2.72 2.37  --   --   --  2.23   < >  --   --   --   --     < > = values in this interval not displayed.        Review of Systems   Constitutional:  Positive for fatigue. Negative for chills and fever.   HENT:  Negative for ear pain and sore throat.    Eyes:   "Negative for pain and visual disturbance.   Respiratory:  Negative for cough and shortness of breath.    Cardiovascular:  Negative for chest pain and palpitations.   Gastrointestinal:  Negative for abdominal pain and vomiting.   Genitourinary:  Negative for dysuria and hematuria.        Nocturia     Musculoskeletal:  Negative for arthralgias and back pain.   Skin:  Negative for color change and rash.   Neurological:  Positive for weakness (bilateral foot drop (L>>R)) and numbness (lower extremities). Negative for seizures and syncope.   Hematological:  Does not bruise/bleed easily.   Psychiatric/Behavioral: Negative.     All other systems reviewed and are negative.         Objective    Exam  /79 (BP Location: Right arm, Patient Position: Sitting, Cuff Size: Adult Regular)   Pulse 96   Temp 97  F (36.1  C) (Temporal)   Resp 20   Ht 1.854 m (6' 0.99\")   Wt 103.4 kg (228 lb)   SpO2 95%   BMI 30.09 kg/m     Estimated body mass index is 30.09 kg/m  as calculated from the following:    Height as of this encounter: 1.854 m (6' 0.99\").    Weight as of this encounter: 103.4 kg (228 lb).    Physical Exam  Vitals reviewed.   Constitutional:       General: He is not in acute distress.     Appearance: Normal appearance.   HENT:      Head: Normocephalic.      Right Ear: Tympanic membrane, ear canal and external ear normal.      Left Ear: Tympanic membrane, ear canal and external ear normal.      Nose: Nose normal.      Mouth/Throat:      Mouth: Mucous membranes are moist.      Pharynx: No posterior oropharyngeal erythema.   Eyes:      Extraocular Movements: Extraocular movements intact.      Conjunctiva/sclera: Conjunctivae normal.      Pupils: Pupils are equal, round, and reactive to light.   Cardiovascular:      Rate and Rhythm: Normal rate and regular rhythm.      Pulses: Normal pulses.      Heart sounds: Normal heart sounds. No murmur heard.  Pulmonary:      Effort: Pulmonary effort is normal.      Breath sounds: " Normal breath sounds.   Abdominal:      Palpations: Abdomen is soft. There is no mass.      Tenderness: There is no abdominal tenderness. There is no guarding or rebound.   Musculoskeletal:         General: No deformity. Normal range of motion.      Cervical back: Normal range of motion and neck supple.   Lymphadenopathy:      Cervical: No cervical adenopathy.   Skin:     General: Skin is warm and dry.      Findings: Lesion (firm nodular lesion just near ulnar styloid) present.   Neurological:      General: No focal deficit present.      Mental Status: He is alert and oriented to person, place, and time.      Sensory: Sensory deficit (numbness feet) present.      Motor: Weakness present.      Gait: Gait abnormal (bilateral foot drop, L>>R (wears brace on L)).   Psychiatric:         Mood and Affect: Mood normal.         Behavior: Behavior normal.       Results for orders placed or performed in visit on 02/12/25   PSA, screen     Status: Normal   Result Value Ref Range    Prostate Specific Antigen Screen 0.86 0.00 - 4.50 ng/mL    Narrative    This result is obtained using the Roche Elecsys total PSA method on the alfredo e801 immunoassay analyzer, which is an ultrasensitive method. Results obtained with different assay methods or kits cannot be used interchangeably.  This test is intended for initial prostate cancer screening. PSA values exceeding the age-specific limits are suspicious for prostate disease, but additional testing, such as prostate biopsy, is needed to diagnose prostate pathology. The American Cancer Society recommends annual examination with digital rectal examination and serum PSA beginning at age 50 and for men with a life expectancy of at least 10 years after detection of prostate cancer. For men in high-risk groups, such as  Americans or men with a first-degree relative diagnosed at a younger age, testing should begin at a younger age. It is generally recommended that information be provided  to patients about the benefits and limitations of testing and treatment so they can make informed decisions.   TSH with free T4 reflex     Status: Normal   Result Value Ref Range    TSH 2.72 0.30 - 4.20 uIU/mL   Vitamin B12     Status: Normal   Result Value Ref Range    Vitamin B12 563 232 - 1,245 pg/mL   Comprehensive metabolic panel (BMP + Alb, Alk Phos, ALT, AST, Total. Bili, TP)     Status: Abnormal   Result Value Ref Range    Sodium 136 135 - 145 mmol/L    Potassium 4.0 3.4 - 5.3 mmol/L    Carbon Dioxide (CO2) 27 22 - 29 mmol/L    Anion Gap 10 7 - 15 mmol/L    Urea Nitrogen 12.9 8.0 - 23.0 mg/dL    Creatinine 0.60 (L) 0.67 - 1.17 mg/dL    GFR Estimate >90 >60 mL/min/1.73m2    Calcium 9.4 8.8 - 10.4 mg/dL    Chloride 99 98 - 107 mmol/L    Glucose 83 70 - 99 mg/dL    Alkaline Phosphatase 106 40 - 150 U/L    AST 41 0 - 45 U/L    ALT 39 0 - 70 U/L    Protein Total 7.3 6.4 - 8.3 g/dL    Albumin 4.5 3.5 - 5.2 g/dL    Bilirubin Total 0.3 <=1.2 mg/dL    Patient Fasting > 8hrs? No    Hemoglobin A1c     Status: Abnormal   Result Value Ref Range    Estimated Average Glucose 123 (H) <117 mg/dL    Hemoglobin A1C 5.9 (H) 0.0 - 5.6 %   Lipid Profile (Chol, Trig, HDL, LDL calc)     Status: Abnormal   Result Value Ref Range    Cholesterol 208 (H) <200 mg/dL    Triglycerides 144 <150 mg/dL    Direct Measure HDL 29 (L) >=40 mg/dL    LDL Cholesterol Calculated 150 (H) <100 mg/dL    Non HDL Cholesterol 179 (H) <130 mg/dL    Patient Fasting > 8hrs? No     Narrative    Cholesterol  Desirable: < 200 mg/dL  Borderline High: 200 - 239 mg/dL  High: >= 240 mg/dL    Triglycerides  Normal: < 150 mg/dL  Borderline High: 150 - 199 mg/dL  High: 200-499 mg/dL  Very High: >= 500 mg/dL    Direct Measure HDL  Female: >= 50 mg/dL   Male: >= 40 mg/dL    LDL Cholesterol  Desirable: < 100 mg/dL  Above Desirable: 100 - 129 mg/dL   Borderline High: 130 - 159 mg/dL   High:  160 - 189 mg/dL   Very High: >= 190 mg/dL    Non HDL Cholesterol  Desirable: <  130 mg/dL  Above Desirable: 130 - 159 mg/dL  Borderline High: 160 - 189 mg/dL  High: 190 - 219 mg/dL  Very High: >= 220 mg/dL   Surgical Pathology Exam     Status: None   Result Value Ref Range    Case Report       Surgical Pathology Report                         Case: [id number]                                  Authorizing Provider:  Maribel,         Collected:           02/12/2025 03:30 PM                                 Roz NEWELL MD                                                               Ordering Location:     [facility]   Received:            02/12/2025 05:35 PM                                 [facility]                                                                  Pathologist:           Kristie Lo MD                                                           Specimen:    Wrist, Right, right ulnar wrist                                                            Final Diagnosis       SKIN LESION, RIGHT ULNAR WRIST, SHAVE BIOPSY:       1.  BENIGN VERRUCOUS KERATOSIS       2.  NO EVIDENCE OF MALIGNANCY        Clinical Information       growing lesion at right ulnar wrist      Gross Description       A(A). Wrist, Right, right ulnar wrist:  Received in formalin, labeled with the patient's name and right ulnar wrist is a dome-shaped, 0.7 x 0.5 x 0.2 cm, white-gray, granular to faintly papilliferous and superficially friable skin shave.  The specimen is inked black.  SS and TE-1C   HERMILO Manning        Microscopic Description       Microscopic examination performed, substantiating the above diagnosis.      Performing Labs       The technical component of this testing was completed at [facility].    Stain controls for all stains resulted within this report have been reviewed and show appropriate reactivity.       Case Images               Signed Electronically by: ROZ TRIVEDI MD  Prior to  immunization administration, verified patients identity using patient s name and date of birth. Please see Immunization Activity for additional information.     Screening Questionnaire for Adult Immunization    Are you sick today?   No   Do you have allergies to medications, food, a vaccine component or latex?   No   Have you ever had a serious reaction after receiving a vaccination?   No   Do you have a long-term health problem with heart, lung, kidney, or metabolic disease (e.g., diabetes), asthma, a blood disorder, no spleen, complement component deficiency, a cochlear implant, or a spinal fluid leak?  Are you on long-term aspirin therapy?   No   Do you have cancer, leukemia, HIV/AIDS, or any other immune system problem?   No   Do you have a parent, brother, or sister with an immune system problem?   No   In the past 3 months, have you taken medications that affect  your immune system, such as prednisone, other steroids, or anticancer drugs; drugs for the treatment of rheumatoid arthritis, Crohn s disease, or psoriasis; or have you had radiation treatments?   No   Have you had a seizure, or a brain or other nervous system problem?   No   During the past year, have you received a transfusion of blood or blood    products, or been given immune (gamma) globulin or antiviral drug?   No   For women: Are you pregnant or is there a chance you could become       pregnant during the next month?   No   Have you received any vaccinations in the past 4 weeks?   No     Immunization questionnaire answers were all negative.      Patient instructed to remain in clinic for 15 minutes afterwards, and to report any adverse reactions.     Screening performed by Risa Cuellar on 2/12/2025 at 3:27 PM.

## 2025-02-13 DIAGNOSIS — E78.2 MIXED HYPERLIPIDEMIA: Primary | ICD-10-CM

## 2025-02-13 LAB
ALBUMIN SERPL BCG-MCNC: 4.5 G/DL (ref 3.5–5.2)
ALP SERPL-CCNC: 106 U/L (ref 40–150)
ALT SERPL W P-5'-P-CCNC: 39 U/L (ref 0–70)
ANION GAP SERPL CALCULATED.3IONS-SCNC: 10 MMOL/L (ref 7–15)
AST SERPL W P-5'-P-CCNC: 41 U/L (ref 0–45)
BILIRUB SERPL-MCNC: 0.3 MG/DL
BUN SERPL-MCNC: 12.9 MG/DL (ref 8–23)
CALCIUM SERPL-MCNC: 9.4 MG/DL (ref 8.8–10.4)
CHLORIDE SERPL-SCNC: 99 MMOL/L (ref 98–107)
CHOLEST SERPL-MCNC: 208 MG/DL
CREAT SERPL-MCNC: 0.6 MG/DL (ref 0.67–1.17)
EGFRCR SERPLBLD CKD-EPI 2021: >90 ML/MIN/1.73M2
FASTING STATUS PATIENT QL REPORTED: NO
FASTING STATUS PATIENT QL REPORTED: NO
GLUCOSE SERPL-MCNC: 83 MG/DL (ref 70–99)
HCO3 SERPL-SCNC: 27 MMOL/L (ref 22–29)
HDLC SERPL-MCNC: 29 MG/DL
LDLC SERPL CALC-MCNC: 150 MG/DL
NONHDLC SERPL-MCNC: 179 MG/DL
POTASSIUM SERPL-SCNC: 4 MMOL/L (ref 3.4–5.3)
PROT SERPL-MCNC: 7.3 G/DL (ref 6.4–8.3)
PSA SERPL DL<=0.01 NG/ML-MCNC: 0.86 NG/ML (ref 0–4.5)
SODIUM SERPL-SCNC: 136 MMOL/L (ref 135–145)
TRIGL SERPL-MCNC: 144 MG/DL
TSH SERPL DL<=0.005 MIU/L-ACNC: 2.72 UIU/ML (ref 0.3–4.2)
VIT B12 SERPL-MCNC: 563 PG/ML (ref 232–1245)

## 2025-02-13 RX ORDER — ROSUVASTATIN CALCIUM 10 MG/1
10 TABLET, COATED ORAL DAILY
Qty: 90 TABLET | Refills: 1 | Status: SHIPPED | OUTPATIENT
Start: 2025-02-13

## 2025-02-14 LAB
PATH REPORT.COMMENTS IMP SPEC: NORMAL
PATH REPORT.COMMENTS IMP SPEC: NORMAL
PATH REPORT.FINAL DX SPEC: NORMAL
PATH REPORT.GROSS SPEC: NORMAL
PATH REPORT.MICROSCOPIC SPEC OTHER STN: NORMAL
PATH REPORT.RELEVANT HX SPEC: NORMAL
PHOTO IMAGE: NORMAL

## 2025-02-17 DIAGNOSIS — M54.16 LUMBAR RADICULOPATHY: ICD-10-CM

## 2025-02-17 RX ORDER — IBUPROFEN 800 MG/1
800 TABLET, FILM COATED ORAL EVERY 8 HOURS PRN
Qty: 100 TABLET | Refills: 0 | Status: SHIPPED | OUTPATIENT
Start: 2025-02-17

## 2025-02-17 ASSESSMENT — ENCOUNTER SYMPTOMS
SORE THROAT: 0
ARTHRALGIAS: 0
BRUISES/BLEEDS EASILY: 0
SEIZURES: 0
ABDOMINAL PAIN: 0
FEVER: 0
FATIGUE: 1
SHORTNESS OF BREATH: 0
NUMBNESS: 1
VOMITING: 0
WEAKNESS: 1
EYE PAIN: 0
COLOR CHANGE: 0
HEMATURIA: 0
BACK PAIN: 0
DYSURIA: 0
COUGH: 0
CHILLS: 0
PALPITATIONS: 0
PSYCHIATRIC NEGATIVE: 1

## 2025-02-26 ENCOUNTER — MYC MEDICAL ADVICE (OUTPATIENT)
Dept: FAMILY MEDICINE | Facility: CLINIC | Age: 65
End: 2025-02-26
Payer: COMMERCIAL

## 2025-02-26 DIAGNOSIS — M54.16 LUMBAR RADICULOPATHY: Primary | ICD-10-CM

## 2025-02-27 RX ORDER — CELECOXIB 200 MG/1
200 CAPSULE ORAL 2 TIMES DAILY
Qty: 180 CAPSULE | Refills: 3 | Status: SHIPPED | OUTPATIENT
Start: 2025-02-27

## 2025-02-27 NOTE — TELEPHONE ENCOUNTER
MyChart response sent. If patient responds, please pend pharmacy and route request to PCP.    Albertina Lozano RN  [facility]

## 2025-03-14 ENCOUNTER — MYC MEDICAL ADVICE (OUTPATIENT)
Dept: NEUROLOGY | Facility: CLINIC | Age: 65
End: 2025-03-14
Payer: COMMERCIAL

## 2025-03-16 DIAGNOSIS — M54.16 LUMBAR RADICULOPATHY: ICD-10-CM

## 2025-03-17 RX ORDER — IBUPROFEN 800 MG/1
800 TABLET, FILM COATED ORAL EVERY 8 HOURS PRN
Qty: 100 TABLET | Refills: 0 | Status: SHIPPED | OUTPATIENT
Start: 2025-03-17

## 2025-03-19 ENCOUNTER — OFFICE VISIT (OUTPATIENT)
Dept: FAMILY MEDICINE | Facility: CLINIC | Age: 65
End: 2025-03-19
Payer: COMMERCIAL

## 2025-03-19 VITALS
WEIGHT: 224 LBS | OXYGEN SATURATION: 97 % | TEMPERATURE: 97 F | HEART RATE: 99 BPM | HEIGHT: 73 IN | BODY MASS INDEX: 29.69 KG/M2 | DIASTOLIC BLOOD PRESSURE: 79 MMHG | SYSTOLIC BLOOD PRESSURE: 122 MMHG | RESPIRATION RATE: 20 BRPM

## 2025-03-19 DIAGNOSIS — Z98.890 STATUS POST LUMBAR SURGERY: ICD-10-CM

## 2025-03-19 DIAGNOSIS — M54.16 LUMBAR RADICULOPATHY: ICD-10-CM

## 2025-03-19 DIAGNOSIS — G89.29 CHRONIC LOW BACK PAIN, UNSPECIFIED BACK PAIN LATERALITY, UNSPECIFIED WHETHER SCIATICA PRESENT: ICD-10-CM

## 2025-03-19 DIAGNOSIS — M21.372 LEFT FOOT DROP: ICD-10-CM

## 2025-03-19 DIAGNOSIS — R49.0 CHRONIC HOARSENESS: Primary | ICD-10-CM

## 2025-03-19 DIAGNOSIS — M54.50 CHRONIC LOW BACK PAIN, UNSPECIFIED BACK PAIN LATERALITY, UNSPECIFIED WHETHER SCIATICA PRESENT: ICD-10-CM

## 2025-03-19 DIAGNOSIS — M21.371 ACQUIRED RIGHT FOOT DROP: ICD-10-CM

## 2025-03-19 DIAGNOSIS — Z87.891 PERSONAL HISTORY OF TOBACCO USE: ICD-10-CM

## 2025-03-19 DIAGNOSIS — F17.210 CIGARETTE NICOTINE DEPENDENCE WITHOUT COMPLICATION: ICD-10-CM

## 2025-03-19 PROCEDURE — G0296 VISIT TO DETERM LDCT ELIG: HCPCS | Performed by: FAMILY MEDICINE

## 2025-03-19 PROCEDURE — 99213 OFFICE O/P EST LOW 20 MIN: CPT | Performed by: FAMILY MEDICINE

## 2025-03-19 PROCEDURE — 3078F DIAST BP <80 MM HG: CPT | Performed by: FAMILY MEDICINE

## 2025-03-19 PROCEDURE — 3074F SYST BP LT 130 MM HG: CPT | Performed by: FAMILY MEDICINE

## 2025-03-19 PROCEDURE — G2211 COMPLEX E/M VISIT ADD ON: HCPCS | Performed by: FAMILY MEDICINE

## 2025-03-19 NOTE — PATIENT INSTRUCTIONS
Lung Cancer Screening   Frequently Asked Questions  If you are at high-risk for lung cancer, getting screened with low-dose computed tomography (LDCT) every year can help save your life. This handout offers answers to some of the most common questions about lung cancer screening. If you have other questions, please call [phone] ([phone]).     What is it?  Lung cancer screening uses special X-ray technology to create an image of your lung tissue. The exam is quick and easy and takes less than 10 seconds. We don t give you any medicine or use any needles. You can eat before and after the exam. You don t need to change your clothes as long as the clothing on your chest doesn t contain metal. But, you do need to be able to hold your breath for at least 6 seconds during the exam.    What is the goal of lung cancer screening?  The goal of lung cancer screening is to save lives. Many times, lung cancer is not found until a person starts having physical symptoms. Lung cancer screening can help detect lung cancer in the earliest stages when it may be easier to treat.    Who should be screened for lung cancer?  We suggest lung cancer screening for anyone who is at high-risk for lung cancer. You are in the high-risk group if you:      are between the ages of 55 and 79, and    have smoked at least 1 pack of cigarettes a day for 20 or more years, and    still smoke or have quit within the past 15 years.    However, if you have a new cough or shortness of breath, you should talk to your doctor before being screened.    Why does it matter if I have symptoms?  Certain symptoms can be a sign that you have a condition in your lungs that should be checked and treated by your doctor. These symptoms include fever, chest pain, a new or changing cough, shortness of breath that you have never felt before, coughing up blood or unexplained weight loss. Having any of these symptoms can greatly affect the results of lung  cancer screening.       Should all smokers get an LDCT lung cancer screening exam?  It depends. Lung cancer screening is for a very specific group of men and women who have a history of heavy smoking over a long period of time (see  Who should be screened for lung cancer  above).  I am in the high-risk group, but have been diagnosed with cancer in the past. Is LDCT lung cancer screening right for me?  In some cases, you should not have LDCT lung screening, such as when your doctor is already following your cancer with CT scan studies. Your doctor will help you decide if LDCT lung screening is right for you.  Do I need to have a screening exam every year?  Yes. If you are in the high-risk group described earlier, you should get an LDCT lung cancer screening exam every year until you are 79, or are no longer willing or able to undergo screening and possible procedures to diagnose and treat lung cancer.  How effective is LDCT at preventing death from lung cancer?  Studies have shown that LDCT lung cancer screening can lower the risk of death from lung cancer by 20 percent in people who are at high-risk.  What are the risks?  There are some risks and limitations of LDCT lung cancer screening. We want to make sure you understand the risks and benefits, so please let us know if you have any questions. Your doctor may want to talk with you more about these risks.    Radiation exposure: As with any exam that uses radiation, there is a very small increased risk of cancer. The amount of radiation in LDCT is small--about the same amount a person would get from a mammogram. Your doctor orders the exam when he or she feels the potential benefits outweigh the risks.    False negatives: No test is perfect, including LDCT. It is possible that you may have a medical condition, including lung cancer, that is not found during your exam. This is called a false negative result.    False positives and more testing: LDCT very often finds  something in the lung that could be cancer, but in fact is not. This is called a false positive result. False positive tests often cause anxiety. To make sure these findings are not cancer, you may need to have more tests. These tests will be done only if you give us permission. Sometimes patients need a treatment that can have side effects, such as a biopsy. For more information on false positives, see  What can I expect from the results?     Findings not related to lung cancer: Your LDCT exam also takes pictures of areas of your body next to your lungs. In a very small number of cases, the CT scan will show an abnormal finding in one of these areas, such as your kidneys, adrenal glands, liver or thyroid. This finding may not be serious, but you may need more tests. Your doctor can help you decide what other tests you may need, if any.  What can I expect from the results?  About 1 out of 4 LDCT exams will find something that may need more tests. Most of the time, these findings are lung nodules. Lung nodules are very small collections of tissue in the lung. These nodules are very common, and the vast majority--more than 97 percent--are not cancer (benign). Most are normal lymph nodes or small areas of scarring from past infections.  But, if a small lung nodule is found to be cancer, the cancer can be cured more than 90 percent of the time. To know if the nodule is cancer, we may need to get more images before your next yearly screening exam. If the nodule has suspicious features (for example, it is large, has an odd shape or grows over time), we will refer you to a specialist for further testing.  Will my doctor also get the results?  Yes. Your doctor will get a copy of your results.  Is it okay to keep smoking now that there s a cancer screening exam?  No. Tobacco is one of the strongest cancer-causing agents. It causes not only lung cancer, but other cancers and cardiovascular (heart) diseases as well. The damage  caused by smoking builds over time. This means that the longer you smoke, the higher your risk of disease. While it is never too late to quit, the sooner you quit, the better.  Where can I find help to quit smoking?  The best way to prevent lung cancer is to stop smoking. If you have already quit smoking, congratulations and keep it up! For help on quitting smoking, please call [employer] at 8-971-QUITNOW (1-593.154.3316) or the [employer] at 1-439.920.9167 to find local resources near you.  One-on-one health coaching:  If you d prefer to work individually with a health care provider on tobacco cessation, we offer:      Medication Therapy Management:  Our specially trained pharmacists work closely with you and your doctor to help you quit smoking.  Call [phone] or [phone] (toll free).

## 2025-03-19 NOTE — PROGRESS NOTES
1. Chronic hoarseness (Primary)  2. Cigarette nicotine dependence without complication  This is a 63 yo male with chronic hoarseness in his voice - notes that this is not changing - he doesn't have any other system symptoms, but is a high risk for cancer related to his nicotine.  Will refer to ENT for evaluation of vocal cords.  Discussed - and will refer for CT-chest.    - Adult ENT  Referral; Future  - Prof fee: Shared Decision Making for Lung Cancer Screening  - CT Chest Lung Cancer Scrn Low Dose wo; Future    Lung Cancer Screening Shared Decision Making Visit     Wm Gaines is eligible for lung cancer screening on the basis of:   has not not experienced symptoms suggestive of lung cancer.   born on 1960, 64 year old years old.   smoked 1.5-2 packs of cigarettes for 45 years for a total of 60+ pack-years   has not quit smoking; is currently smoking.      I have discussed with patient the risks and benefits of screening for lung cancer with low-dose CT.     The risks include:   radiation exposure    false positives     over-diagnosis    The benefit of early detection of lung cancer is contingent upon adherence to annual screening or more frequent follow up if indicated.     Furthermore, reaping the benefits of screening requires Wm Gaines to be willing and able to undergo diagnostic procedures, if indicated.     We did discuss that the only way to prevent lung cancer is to not smoke. Smoking cessation assistance was offered.    3. Lumbar radiculopathy  4. Acquired right foot drop  5. Chronic low back pain, unspecified back pain laterality, unspecified whether sciatica present  6. Left foot drop  7. Status post lumbar surgery  This is a 63 yo male with long history of lower extremity issues - has had left foot drop, then more lower extremity weakness - had lumbar surgery, then developed right sided symptoms with foot drop/weakness.  Is struggling with neuropathy vs. Radiculopathy.  Wants  "to have a second opinion.  Referral written at patient request.  See notes for details.      - Adult Neurosurgery  Referral; Future  8. Personal history of tobacco use  As above - refer for CT lung cancer screening   - Prof fee: Shared Decision Making for Lung Cancer Screening  - CT Chest Lung Cancer Scrn Low Dose wo; Future    The longitudinal plan of care for the diagnosis(es)/condition(s) as documented were addressed during this visit. Due to the added complexity in care, I will continue to support Wm in the subsequent management and with ongoing continuity of care.    Zachery Burk is a 64 year old, presenting for the following health issues:  Throat Problem (Pt state been having serious phlegm), Breathing Problem, and Musculoskeletal Problem (Pt would like Dr look at his back and feet )        3/19/2025    11:39 AM   Additional Questions   Roomed by Risa     Always has a low level of congestion in throat   Throat is more sore than usual  Got worse and worse   On  - bought Guaifenesin - sparingly - took 1 q 12 hours  Then, occ decongestant  Reuses plastic bottles   Apartment - low water pressure for hot water  Started with pitchers for water; and sponges to clean with hot water  2 Guaifenesin BID  Then started boiling onions -   Better than it was   Grabbed some sputum this morning -     Had suggested a lung xray  Wanted cortisol level to check \"my stress\"   Looked up what the test is used for     Smoker -   Smoked 45 years  - smoked at least 1-1/2 ppd , sometimes more  Still smoking 1-1/2 ppd  Grandmother -maternal -  of lung cancer  Mom didn't smoke, dad smoked      Had both hips and knees replaced; after last knee was replaced (left), about 3 months later, noticed left foot drop  Offered to send to spine specialist - declined  Foot drop continued - foot was slapping  Saw someone for foot drop 2022 -   Wasn't having any back pain - and didn't want surgery   Could do MRI and EMG to " "see where it's coming from   In 11/2022 - tripped on sidewalk -  In January 2023 - foot caught wood floor - fell into corner  Started using AFO brace  February 28, 2023 - sudden onset numbness in glutes, hamstrings - no balance  Gave it a day or two - resolving, but still tight - contacted [place] [facility] to do the scan/EMG they had offered previously  In the meanwhile, looking for work, on [place]  Referred to urgent back surgery  - Dr. 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 Lam -             History of Present Illness       Reason for visit:  Phlegm in throat nasal congestion and to discuss current staus of lower back and feet. He is missing 7 dose(s) of medications per week.  He is not taking prescribed medications regularly due to other.        Review of Systems   Constitutional:  Negative for chills and fever.   HENT:  Negative for ear pain and sore throat.    Eyes:  Negative for pain and visual disturbance.   Respiratory:  Negative for cough and shortness of breath.    Cardiovascular:  Negative for chest pain and palpitations.   Gastrointestinal:  Negative for abdominal pain and vomiting.   Genitourinary:  Negative for dysuria and hematuria.   Musculoskeletal:  Positive for gait problem (bilateral " "foot drop  - uses AFO on left; but does have foot drop on right as well). Negative for arthralgias and back pain.   Skin:  Negative for color change and rash.   Neurological:  Positive for weakness (lower extremity). Negative for seizures and syncope.   All other systems reviewed and are negative.          Objective    /79 (BP Location: Right arm, Patient Position: Sitting, Cuff Size: Adult Regular)   Pulse 99   Temp 97  F (36.1  C) (Temporal)   Resp 20   Ht 1.854 m (6' 0.99\")   Wt 101.6 kg (224 lb)   SpO2 97%   BMI 29.56 kg/m    Body mass index is 29.56 kg/m .  Physical Exam  Vitals reviewed.   Constitutional:       General: He is not in acute distress.     Appearance: Normal appearance.   HENT:      Head: Normocephalic.      Right Ear: Tympanic membrane, ear canal and external ear normal.      Left Ear: Tympanic membrane, ear canal and external ear normal.      Nose: Nose normal.      Mouth/Throat:      Mouth: Mucous membranes are moist.      Pharynx: No posterior oropharyngeal erythema.   Eyes:      Extraocular Movements: Extraocular movements intact.      Conjunctiva/sclera: Conjunctivae normal.      Pupils: Pupils are equal, round, and reactive to light.   Cardiovascular:      Rate and Rhythm: Normal rate and regular rhythm.      Pulses: Normal pulses.      Heart sounds: Normal heart sounds. No murmur heard.  Pulmonary:      Effort: Pulmonary effort is normal.      Breath sounds: Normal breath sounds.   Abdominal:      Palpations: Abdomen is soft. There is no mass.      Tenderness: There is no abdominal tenderness. There is no guarding or rebound.   Musculoskeletal:         General: No deformity. Normal range of motion.      Cervical back: Normal range of motion and neck supple.   Lymphadenopathy:      Cervical: No cervical adenopathy.   Skin:     General: Skin is warm and dry.   Neurological:      General: No focal deficit present.      Mental Status: He is alert and oriented to person, place, and " time.      Motor: Weakness present.      Gait: Gait abnormal (bilateral foot drop L>>R).   Psychiatric:         Mood and Affect: Mood normal.         Behavior: Behavior normal.            No results found for any visits on 03/19/25.        Signed Electronically by: MARLON TRIVEDI MD    Lung Cancer Screening Shared Decision Making Visit     Wm Gaines, a 64 year old male, is eligible for lung cancer screening    History   Smoking Status    Every Day    Types: Cigarettes   Smokeless Tobacco    Never       I have discussed with patient the risks and benefits of screening for lung cancer with low-dose CT.     The risks include:    radiation exposure: one low dose chest CT has as much ionizing radiation as about 15 chest x-rays, or 6 months of background radiation living in [place]      false positives: most findings/nodules are NOT cancer, but some might still require additional diagnostic evaluation, including biopsy    over-diagnosis: some slow growing cancers that might never have been clinically significant will be detected and treated unnecessarily     The benefit of early detection of lung cancer is contingent upon adherence to annual screening or more frequent follow up if indicated.     Furthermore, to benefit from screening, Wm must be willing and able to undergo diagnostic procedures, if indicated. Although no specific guide is available for determining severity of comorbidities, it is reasonable to withhold screening in patients who have greater mortality risk from other diseases.     We did discuss that the best way to prevent lung cancer is to not smoke.    Some patients may value a numeric estimation of lung cancer risk when evaluating if lung cancer screening is right for them, here is one calculator:    ShouldIScreen

## 2025-03-30 ASSESSMENT — ENCOUNTER SYMPTOMS
FEVER: 0
SHORTNESS OF BREATH: 0
ABDOMINAL PAIN: 0
COLOR CHANGE: 0
BACK PAIN: 0
SEIZURES: 0
DYSURIA: 0
ARTHRALGIAS: 0
COUGH: 0
CHILLS: 0
HEMATURIA: 0
WEAKNESS: 1
PALPITATIONS: 0
SORE THROAT: 0
EYE PAIN: 0
VOMITING: 0

## 2025-04-12 DIAGNOSIS — M54.16 LUMBAR RADICULOPATHY: ICD-10-CM

## 2025-04-13 RX ORDER — IBUPROFEN 800 MG/1
800 TABLET, FILM COATED ORAL EVERY 8 HOURS PRN
Qty: 100 TABLET | Refills: 0 | Status: SHIPPED | OUTPATIENT
Start: 2025-04-13

## 2025-04-14 ENCOUNTER — TELEPHONE (OUTPATIENT)
Dept: FAMILY MEDICINE | Facility: CLINIC | Age: 65
End: 2025-04-14
Payer: COMMERCIAL

## 2025-04-14 DIAGNOSIS — R49.0 CHRONIC HOARSENESS: Primary | ICD-10-CM

## 2025-04-14 NOTE — TELEPHONE ENCOUNTER
General Call      Reason for Call:  Referral     What are your questions or concerns:  Pt would like to change locations of his ENT referral from [facility] to [facility] in [place]. He was able to get a earlier appointment with [facility] and would like pcp to fax over referral to [facility] location instead. Pt is scheduled to be seen there April 30 at 1pm. Fax number : [phone]    Date of last appointment with provider:     Could we send this information to you in [employer] or would you prefer to receive a phone call?:   Patient would prefer a phone call   Okay to leave a detailed message?: Yes at Cell number on file:    Telephone Information:   Mobile [phone]

## 2025-04-30 ENCOUNTER — TRANSFERRED RECORDS (OUTPATIENT)
Dept: HEALTH INFORMATION MANAGEMENT | Facility: CLINIC | Age: 65
End: 2025-04-30
Payer: COMMERCIAL

## 2025-05-04 DIAGNOSIS — M54.16 LUMBAR RADICULOPATHY: ICD-10-CM

## 2025-05-04 RX ORDER — GABAPENTIN 100 MG/1
200 CAPSULE ORAL 3 TIMES DAILY
Qty: 180 CAPSULE | Refills: 0 | Status: SHIPPED | OUTPATIENT
Start: 2025-05-04

## 2025-05-08 ENCOUNTER — RESULTS FOLLOW-UP (OUTPATIENT)
Dept: FAMILY MEDICINE | Facility: CLINIC | Age: 65
End: 2025-05-08

## 2025-05-08 ENCOUNTER — HOSPITAL ENCOUNTER (OUTPATIENT)
Dept: CT IMAGING | Facility: CLINIC | Age: 65
Discharge: HOME | End: 2025-05-08
Attending: FAMILY MEDICINE
Payer: COMMERCIAL

## 2025-05-08 DIAGNOSIS — F17.210 CIGARETTE NICOTINE DEPENDENCE WITHOUT COMPLICATION: ICD-10-CM

## 2025-05-08 DIAGNOSIS — Z87.891 PERSONAL HISTORY OF TOBACCO USE: ICD-10-CM

## 2025-05-08 PROCEDURE — 71271 CT THORAX LUNG CANCER SCR C-: CPT

## 2025-05-09 DIAGNOSIS — M54.16 LUMBAR RADICULOPATHY: ICD-10-CM

## 2025-05-11 RX ORDER — IBUPROFEN 800 MG/1
800 TABLET, FILM COATED ORAL EVERY 8 HOURS PRN
Qty: 100 TABLET | Refills: 0 | Status: SHIPPED | OUTPATIENT
Start: 2025-05-11

## 2025-05-28 ENCOUNTER — OFFICE VISIT (OUTPATIENT)
Dept: FAMILY MEDICINE | Facility: CLINIC | Age: 65
End: 2025-05-28
Payer: COMMERCIAL

## 2025-05-28 VITALS
HEIGHT: 72 IN | DIASTOLIC BLOOD PRESSURE: 88 MMHG | TEMPERATURE: 97.4 F | OXYGEN SATURATION: 96 % | WEIGHT: 222 LBS | RESPIRATION RATE: 22 BRPM | BODY MASS INDEX: 30.07 KG/M2 | SYSTOLIC BLOOD PRESSURE: 133 MMHG | HEART RATE: 95 BPM

## 2025-05-28 DIAGNOSIS — M54.16 LUMBAR RADICULOPATHY: ICD-10-CM

## 2025-05-28 DIAGNOSIS — Z01.818 PREOP EXAMINATION: Primary | ICD-10-CM

## 2025-05-28 LAB
ATRIAL RATE - MUSE: 87 BPM
DIASTOLIC BLOOD PRESSURE - MUSE: NORMAL MMHG
ERYTHROCYTE [DISTWIDTH] IN BLOOD BY AUTOMATED COUNT: 13.7 % (ref 10–15)
HCT VFR BLD AUTO: 39.7 % (ref 40–53)
HGB BLD-MCNC: 13.3 G/DL (ref 13.3–17.7)
INTERPRETATION ECG - MUSE: NORMAL
MCH RBC QN AUTO: 31.1 PG (ref 26.5–33)
MCHC RBC AUTO-ENTMCNC: 33.5 G/DL (ref 31.5–36.5)
MCV RBC AUTO: 93 FL (ref 78–100)
P AXIS - MUSE: 65 DEGREES
PLATELET # BLD AUTO: 238 10E3/UL (ref 150–450)
PR INTERVAL - MUSE: 164 MS
QRS DURATION - MUSE: 120 MS
QT - MUSE: 366 MS
QTC - MUSE: 440 MS
R AXIS - MUSE: -79 DEGREES
RBC # BLD AUTO: 4.28 10E6/UL (ref 4.4–5.9)
SYSTOLIC BLOOD PRESSURE - MUSE: NORMAL MMHG
T AXIS - MUSE: 69 DEGREES
VENTRICULAR RATE- MUSE: 87 BPM
WBC # BLD AUTO: 6.3 10E3/UL (ref 4–11)

## 2025-05-28 PROCEDURE — 80048 BASIC METABOLIC PNL TOTAL CA: CPT | Performed by: FAMILY MEDICINE

## 2025-05-28 PROCEDURE — 36415 COLL VENOUS BLD VENIPUNCTURE: CPT | Performed by: FAMILY MEDICINE

## 2025-05-28 PROCEDURE — 85027 COMPLETE CBC AUTOMATED: CPT | Performed by: FAMILY MEDICINE

## 2025-05-28 PROCEDURE — 3079F DIAST BP 80-89 MM HG: CPT | Performed by: FAMILY MEDICINE

## 2025-05-28 PROCEDURE — 3075F SYST BP GE 130 - 139MM HG: CPT | Performed by: FAMILY MEDICINE

## 2025-05-28 PROCEDURE — 99214 OFFICE O/P EST MOD 30 MIN: CPT | Performed by: FAMILY MEDICINE

## 2025-05-28 PROCEDURE — 93005 ELECTROCARDIOGRAM TRACING: CPT | Performed by: FAMILY MEDICINE

## 2025-05-28 PROCEDURE — 93010 ELECTROCARDIOGRAM REPORT: CPT | Performed by: INTERNAL MEDICINE

## 2025-05-28 RX ORDER — IBUPROFEN 200 MG
1 CAPSULE ORAL 2 TIMES DAILY
COMMUNITY
Start: 2025-05-28

## 2025-05-28 NOTE — PROGRESS NOTES
Preoperative Evaluation  [facility] [address] [place] [address]  Phone: [phone]  Fax: [phone]  Primary Provider: MARLON TRIVEDI MD  Pre-op Performing Provider: MARLON TRIVEDI MD  May 28, 2025   {Provider  Link to PREOP SmartSet  REQUIRED to apply standard patient instructions and medication directions to the AVS :996652}  {ROOMER review and update patient entered surgical information if needed :994022}        5/24/2025   Surgical Information   What procedure is being done? Facetectomy and Fusion L5-S1 hardware removed L3-L5   Facility or Hospital where procedure/surgery will be performed: [facility]   Who is doing the procedure / surgery? Dr. Kashmir Grier   Date of surgery / procedure: 6-3-25   Time of surgery / procedure: 6am   Where do you plan to recover after surgery? Home - with help from neighbor     Fax number for surgical facility: [phone]  Also fax preop to [facility] at [phone]    Assessment & Plan     The proposed surgical procedure is considered INTERMEDIATE risk.    Problem List Items Addressed This Visit    None  Visit Diagnoses         Preop examination    -  Primary      Lumbar radiculopathy                  Possible Sleep Apnea: none known           - No identified additional risk factors other than previously addressed    Antiplatelet or Anticoagulation Medication Instructions   - We reviewed the medication list and the patient is not on an antiplatelet or anticoagulation medications.    Additional Medication Instructions   - pregabalin, gabapentin: Continue without modification.   - ibuprofen (Advil, Motrin): DO NOT TAKE 1 day before surgery.  - already stopped (6 days preop)    Recommendation  Approval given to proceed with proposed procedure, without further diagnostic evaluation.        Zachery Burk is a 64 year old, presenting for the following:  Pre-Op  Exam          5/28/2025     2:51 PM   Additional Questions   Roomed by hser   Accompanied by self     HPI: weakness is increasing in both lower extremities - pain radiates to hip/top of thigh on left; EMG localized to L5-S1.          5/24/2025   Pre-Op Questionnaire   Have you ever had a heart attack or stroke? No   Have you ever had surgery on your heart or blood vessels, such as a stent placement, a coronary artery bypass, or surgery on an artery in your head, neck, heart, or legs? No   Do you have chest pain with activity? No   Do you have a history of heart failure? No   Do you currently have a cold, bronchitis or symptoms of other infection? No   Do you have a cough, shortness of breath, or wheezing? No   Do you or anyone in your family have previous history of blood clots? No   Do you or does anyone in your family have a serious bleeding problem such as prolonged bleeding following surgeries or cuts? No   Have you ever had problems with anemia or been told to take iron pills? No   Have you had any abnormal blood loss such as black, tarry or bloody stools? No   Have you ever had a blood transfusion? (!) UNKNOWN   Are you willing to have a blood transfusion if it is medically needed before, during, or after your surgery? Yes   Have you or any of your relatives ever had problems with anesthesia? No   Do you have sleep apnea, excessive snoring or daytime drowsiness? (!) UNKNOWN   Do you have any artifical heart valves or other implanted medical devices like a pacemaker, defibrillator, or continuous glucose monitor? No   Do you have artificial joints? (!) YES - both hips, both knees, L3-L5 fusion   Are you allergic to latex? No     Advance Care Planning  {The storyboard will display whether the patient has ACP docs on file. Hover over the Code section in the storyboard to access the ACP documents. :431885}  No written documents     Preoperative Review of    reviewed - has had Gabapentin      Status of Chronic  Conditions:  See problem list for active medical problems.  Problems all longstanding and stable, except as noted/documented.  See ROS for pertinent symptoms related to these conditions.    Patient Active Problem List    Diagnosis Date Noted    Neuromuscular disease (H) 02/12/2025     Priority: Medium    Status post lumbar surgery 04/27/2023     Priority: Medium    Status post total right knee replacement 11/05/2020     Priority: Medium    Primary osteoarthritis of right knee 11/02/2020     Priority: Medium    Primary osteoarthritis of left hip 10/02/2019     Priority: Medium    Primary osteoarthritis of left knee 02/06/2019     Priority: Medium    Primary osteoarthritis of right hip 09/18/2018     Priority: Medium    Family history of diabetes mellitus 06/15/2018     Priority: Medium    Personal history of tobacco use, presenting hazards to health 06/15/2018     Priority: Medium    Hip pain, right 06/15/2018     Priority: Medium    Left knee pain 06/15/2018     Priority: Medium    Alcohol use 06/15/2018     Priority: Medium      Past Medical History:   Diagnosis Date    Arthritis      Past Surgical History:   Procedure Laterality Date    ARTHROSCOPY SHOULDER ROTATOR CUFF REPAIR Right 11/01/2019    BACK SURGERY  4/27/23    Big mistake    FUSION TRANSFORAMINAL LUMBAR INTERBODY, 2 LEVELS, POSTERIOR APPROACH, USING OTS IMAGING GUIDANCE Left 04/27/2023    Procedure: LEFT LUMBAR 3-4 AND LEFT LUMBAR 4-5 TRANSFORAMINAL LUMBAR INTERBODY FUSION WITH LAMINECTOMY USING STEALTH NAVIGATION;  Surgeon: Philip Rico MD;  Location: [facility]    JOINT REPLACEMENT Left 02/06/2019    TKA    SOFT TISSUE SURGERY  11/22/2019    Rotator cuff surgery left shoulder    [facility] TOTAL HIP ARTHROPLASTY Right 09/18/2018    Procedure: RIGHT TOTAL HIP ARTHROPLASTY, POSTERIOR APPROACH;  Surgeon: Gerard Evans MD;  Location: [facility];  Service: Orthopedics    [facility] TOTAL HIP ARTHROPLASTY Left 10/02/2019    Procedure: LEFT  DIRECT ANTERIOR TOTAL HIP ARTHROPLASTY;  Surgeon: Gerard Evans MD;  Location: [facility];  Service: Orthopedics    [facility] TOTAL KNEE ARTHROPLASTY Left 2019    Procedure: LEFT TOTAL KNEE ARTHROPLASTY;  Surgeon: Gerard Evans MD;  Location: [facility];  Service: Orthopedics    [facility] TOTAL KNEE ARTHROPLASTY Right 2020    Procedure: RIGHT TOTAL KNEE ARTHROPLASTY;  Surgeon: Gerard Evans MD;  Location: [facility] OR;  Service: Orthopedics     Current Outpatient Medications   Medication Sig Dispense Refill    ACETAMINOPHEN EXTRA STRENGTH 500 MG tablet Take 1 to 2 tablets by mouth three times daily as needed for mild pain. 540 tablet 1    calcium citrate (CITRACAL) 950 (200 Ca) MG tablet Take 1 tablet by mouth 2 times daily.      gabapentin (NEURONTIN) 100 MG capsule Take 2 capsules by mouth three times daily. 180 capsule 0    ibuprofen (ADVIL/MOTRIN) 800 MG tablet Take 1 tablet by mouth every 8 hours as needed for moderate pain. 100 tablet 0    UNABLE TO FIND MEDICATION NAME: Electric bone growth stimulator - for use after surgery         Allergies   Allergen Reactions    Celecoxib Rash     Itching         Social History     Tobacco Use    Smoking status: Former     Current packs/day: 0.00     Average packs/day: 2.0 packs/day for 43.6 years (87.3 ttl pk-yrs)     Types: Cigarettes     Start date: 1980     Quit date: 2024     Years since quittin.3    Smokeless tobacco: Never   Substance Use Topics    Alcohol use: Not Currently     Family History   Problem Relation Age of Onset    Diabetes Mother      History   Drug Use No           {ROS Picklists (Optional):504165}    Objective    /88 (BP Location: Right arm, Patient Position: Sitting, Cuff Size: Adult Regular)   Pulse 95   Temp 97.4  F (36.3  C) (Temporal)   Resp 22   Ht 1.829 m (6')   Wt 100.7 kg (222 lb)   SpO2 96%   BMI 30.11 kg/m     Estimated body mass index is 30.11 kg/m  as calculated from the following:     Height as of this encounter: 1.829 m (6').    Weight as of this encounter: 100.7 kg (222 lb).  Physical Exam  {Exam List :247025}    Recent Labs   Lab Test 25  1701      POTASSIUM 4.0   CR 0.60*   A1C 5.9*        Diagnostics  {LABS:186984}   {EK}    Revised Cardiac Risk Index (RCRI)  The patient has the following serious cardiovascular risks for perioperative complications:  {PREOP REVISED CARDIAC RISK INDEX (RCRI) :386746}     RCRI Interpretation: {REVISED CARDIAC RISK INTERPRETATION :955999}         Signed Electronically by: MARLON TRIVEDI MD  A copy of this evaluation report is provided to the requesting physician.    {Provider Resources  Preop [facility] Preop Guidelines  Revised Cardiac Risk Index :314405}   {Email feedback regarding this note to [email or web address]   :884066}   normal.      Breath sounds: Normal breath sounds.   Abdominal:      Palpations: Abdomen is soft. There is no mass.      Tenderness: There is no abdominal tenderness. There is no guarding or rebound.   Musculoskeletal:         General: No deformity. Normal range of motion.      Cervical back: Normal range of motion and neck supple.   Lymphadenopathy:      Cervical: No cervical adenopathy.   Skin:     General: Skin is warm and dry.   Neurological:      General: No focal deficit present.      Mental Status: He is alert and oriented to person, place, and time.      Motor: Weakness (bilateral foot drop) present.      Gait: Gait abnormal.   Psychiatric:         Mood and Affect: Mood normal.         Behavior: Behavior normal.           Recent Labs   Lab Test 02/12/25  1701      POTASSIUM 4.0   CR 0.60*   A1C 5.9*        Diagnostics  Recent Results (from the past week)   EKG 12-lead, tracing only    Collection Time: 05/28/25  4:30 PM   Result Value Ref Range    Systolic Blood Pressure  mmHg    Diastolic Blood Pressure  mmHg    Ventricular Rate 87 BPM    Atrial Rate 87 BPM    NV Interval 164 ms    QRS Duration 120 ms     ms    QTc 440 ms    P Axis 65 degrees    R AXIS -79 degrees    T Axis 69 degrees    Interpretation ECG       Sinus rhythm  Right bundle branch block  Left anterior fascicular block  ** Bifascicular block **  Abnormal ECG  When compared with ECG of 18-Sep-2020 13:35,  No significant change was found  Confirmed by JUAN PABLO MANUEL MD LOC: ([id number]) on 5/30/2025 9:46:01 AM     CBC with platelets    Collection Time: 05/28/25  4:44 PM   Result Value Ref Range    WBC Count 6.3 4.0 - 11.0 10e3/uL    RBC Count 4.28 (L) 4.40 - 5.90 10e6/uL    Hemoglobin 13.3 13.3 - 17.7 g/dL    Hematocrit 39.7 (L) 40.0 - 53.0 %    MCV 93 78 - 100 fL    MCH 31.1 26.5 - 33.0 pg    MCHC 33.5 31.5 - 36.5 g/dL    RDW 13.7 10.0 - 15.0 %    Platelet Count 238 150 - 450 10e3/uL   Basic metabolic panel  (Ca, Cl, CO2, Creat, Gluc, K,  Na, BUN)    Collection Time: 05/28/25  4:44 PM   Result Value Ref Range    Sodium 137 135 - 145 mmol/L    Potassium 4.4 3.4 - 5.3 mmol/L    Chloride 101 98 - 107 mmol/L    Carbon Dioxide (CO2) 27 22 - 29 mmol/L    Anion Gap 9 7 - 15 mmol/L    Urea Nitrogen 12.6 8.0 - 23.0 mg/dL    Creatinine 0.59 (L) 0.67 - 1.17 mg/dL    GFR Estimate >90 >60 mL/min/1.73m2    Calcium 9.2 8.8 - 10.4 mg/dL    Glucose 91 70 - 99 mg/dL          Revised Cardiac Risk Index (RCRI)  The patient has the following serious cardiovascular risks for perioperative complications:   - No serious cardiac risks = 0 points     RCRI Interpretation: 1 point: Class II (low risk - 0.9% complication rate)         Signed Electronically by: MARLON TRIVEDI MD  A copy of this evaluation report is provided to the requesting physician.

## 2025-05-28 NOTE — PROGRESS NOTES
Prior to immunization administration, verified patients identity using patient s name and date of birth. Please see Immunization Activity for additional information.     Screening Questionnaire for Adult Immunization    Are you sick today?   No   Do you have allergies to medications, food, a vaccine component or latex?   Yes   Have you ever had a serious reaction after receiving a vaccination?   No   Do you have a long-term health problem with heart, lung, kidney, or metabolic disease (e.g., diabetes), asthma, a blood disorder, no spleen, complement component deficiency, a cochlear implant, or a spinal fluid leak?  Are you on long-term aspirin therapy?   No   Do you have cancer, leukemia, HIV/AIDS, or any other immune system problem?   No   Do you have a parent, brother, or sister with an immune system problem?   No   In the past 3 months, have you taken medications that affect  your immune system, such as prednisone, other steroids, or anticancer drugs; drugs for the treatment of rheumatoid arthritis, Crohn s disease, or psoriasis; or have you had radiation treatments?   No   Have you had a seizure, or a brain or other nervous system problem?   No   During the past year, have you received a transfusion of blood or blood    products, or been given immune (gamma) globulin or antiviral drug?   No   For women: Are you pregnant or is there a chance you could become       pregnant during the next month?   No   Have you received any vaccinations in the past 4 weeks?   No     Immunization questionnaire was positive for at least one answer.  Notified provider.      Patient instructed to remain in clinic for 15 minutes afterwards, and to report any adverse reactions.     Screening performed by Tulio Bains MA on 5/28/2025 at 2:53 PM.

## 2025-05-29 LAB
ANION GAP SERPL CALCULATED.3IONS-SCNC: 9 MMOL/L (ref 7–15)
BUN SERPL-MCNC: 12.6 MG/DL (ref 8–23)
CALCIUM SERPL-MCNC: 9.2 MG/DL (ref 8.8–10.4)
CHLORIDE SERPL-SCNC: 101 MMOL/L (ref 98–107)
CREAT SERPL-MCNC: 0.59 MG/DL (ref 0.67–1.17)
EGFRCR SERPLBLD CKD-EPI 2021: >90 ML/MIN/1.73M2
GLUCOSE SERPL-MCNC: 91 MG/DL (ref 70–99)
HCO3 SERPL-SCNC: 27 MMOL/L (ref 22–29)
POTASSIUM SERPL-SCNC: 4.4 MMOL/L (ref 3.4–5.3)
SODIUM SERPL-SCNC: 137 MMOL/L (ref 135–145)

## 2025-05-30 LAB
ATRIAL RATE - MUSE: 87 BPM
DIASTOLIC BLOOD PRESSURE - MUSE: NORMAL MMHG
INTERPRETATION ECG - MUSE: NORMAL
P AXIS - MUSE: 65 DEGREES
PR INTERVAL - MUSE: 164 MS
QRS DURATION - MUSE: 120 MS
QT - MUSE: 366 MS
QTC - MUSE: 440 MS
R AXIS - MUSE: -79 DEGREES
SYSTOLIC BLOOD PRESSURE - MUSE: NORMAL MMHG
T AXIS - MUSE: 69 DEGREES
VENTRICULAR RATE- MUSE: 87 BPM

## 2025-06-02 DIAGNOSIS — M54.16 LUMBAR RADICULOPATHY: ICD-10-CM

## 2025-06-02 RX ORDER — GABAPENTIN 100 MG/1
200 CAPSULE ORAL 3 TIMES DAILY
Qty: 180 CAPSULE | Refills: 0 | Status: SHIPPED | OUTPATIENT
Start: 2025-06-02

## 2025-06-02 ASSESSMENT — ENCOUNTER SYMPTOMS
ARTHRALGIAS: 0
COLOR CHANGE: 0
EYE PAIN: 0
CHILLS: 0
PALPITATIONS: 0
COUGH: 0
VOMITING: 0
BACK PAIN: 1
WEAKNESS: 1
DYSURIA: 0
SEIZURES: 0
FEVER: 0
ABDOMINAL PAIN: 0
HEMATURIA: 0
SORE THROAT: 0
SHORTNESS OF BREATH: 0
NERVOUS/ANXIOUS: 1

## 2025-07-07 ENCOUNTER — TELEPHONE (OUTPATIENT)
Dept: FAMILY MEDICINE | Facility: CLINIC | Age: 65
End: 2025-07-07
Payer: COMMERCIAL

## 2025-07-07 NOTE — TELEPHONE ENCOUNTER
New Medication Request        What medication are you requesting?: pseudoephedrine hydrochloride 30MG    Reason for medication request: Congestion     Have you taken this medication before?: N/A    Controlled Substance Agreement on file:   CSA -- Patient Level:    CSA: None found at the patient level.         Patient offered an appointment? No    Preferred Pharmacy:  Phone: [phone] Fax: [phone]    [facility] - [place], [place] - [address]  [address] [facility] [address]  [facility] [address]  Phone: [phone] Fax: [phone]        Could we send this information to you in [employer] or would you prefer to receive a phone call?:   Patient would prefer a phone call   Okay to leave a detailed message?: Yes at Home number on file [phone] (home)

## 2025-07-08 DIAGNOSIS — M54.16 LUMBAR RADICULOPATHY: ICD-10-CM

## 2025-07-08 RX ORDER — GABAPENTIN 100 MG/1
200 CAPSULE ORAL 3 TIMES DAILY
Qty: 180 CAPSULE | Refills: 0 | Status: SHIPPED | OUTPATIENT
Start: 2025-07-08

## 2025-07-08 NOTE — TELEPHONE ENCOUNTER
Patient is returning a call.  Patient always has mild congestion and February and March is was major.  Patient went to ENT.  Patient was prescribed medication for sinus infection.  Patient is having congestion in head and ear.  Patient is looking for medication to help with congestion.   Patient had an infusion and cannot take steroids.  Patient is requesting to speak with clinic direct.  Please phone patient tomorrow July 9th regarding medication.    Blanca Ma RN/FNA

## 2025-07-08 NOTE — TELEPHONE ENCOUNTER
Left voicemail for patient requesting return call to clinic. If patient calls back, please assess congestion/triage if needed.    Albertina Lozano RN  [facility]

## 2025-07-09 ENCOUNTER — MYC MEDICAL ADVICE (OUTPATIENT)
Dept: FAMILY MEDICINE | Facility: CLINIC | Age: 65
End: 2025-07-09
Payer: COMMERCIAL

## 2025-07-10 NOTE — TELEPHONE ENCOUNTER
Patient called related to wanting medication for sinus congestion. Informed patient that he would need to 1. Submit an e-visit with Dr. Hamilton. 2. Scheduled an appointment with another provider. 3. Urgent Care.     Informed patient that Dr. Hamilton is out today and tomorrow. Will be back in the office on Monday.   Informed patient could check for another provider availability for tomorrow. Patient stated Dr. Hamilton is very familiar with patient's situation and would wait to submit the e-visit on Monday.     HARMAN OspinaN RN  [facility]

## 2025-08-23 DIAGNOSIS — M54.16 LUMBAR RADICULOPATHY: ICD-10-CM

## 2025-08-24 RX ORDER — GABAPENTIN 100 MG/1
200 CAPSULE ORAL 3 TIMES DAILY
Qty: 180 CAPSULE | Refills: 0 | Status: SHIPPED | OUTPATIENT
Start: 2025-08-24

## 2025-08-26 DIAGNOSIS — M54.16 LUMBAR RADICULOPATHY: ICD-10-CM

## 2025-08-28 RX ORDER — IBUPROFEN 800 MG/1
800 TABLET, FILM COATED ORAL EVERY 8 HOURS PRN
Qty: 100 TABLET | Refills: 0 | Status: SHIPPED | OUTPATIENT
Start: 2025-08-28

## (undated) DEVICE — SU MONOCRYL 3-0 PS-2 18" UND MCP497G

## (undated) DEVICE — DRAPE STERI TOWEL LG 1010

## (undated) DEVICE — CELL SAVER

## (undated) DEVICE — DRSG BIOPATCH GERMICIDAL SPLIT SPONGE 4MM MED 4150

## (undated) DEVICE — POSITIONER ARM CRADLE LAMINECTOMY DISP

## (undated) DEVICE — KIT DRAIN CLOSED WOUND SUCTION MED 400ML RESVR

## (undated) DEVICE — GOWN REINFORCED XXLG 9071

## (undated) DEVICE — ESU PENCIL SMOKE EVAC W/ROCKER SWITCH 0703-047-000

## (undated) DEVICE — SYR BULB IRRIG DOVER 60 ML LATEX FREE 67000

## (undated) DEVICE — DRAPE O ARM TUBE 9732722

## (undated) DEVICE — DRSG TEGADERM 2 3/8X2 3/4" 1624W

## (undated) DEVICE — PACK MINOR SINGLE BASIN SSK3001

## (undated) DEVICE — SUTURE VICRYL+ 1 27IN CT-2 VLT VCP335H

## (undated) DEVICE — CATH IV ANGIO INTRO 14GA X 1 3/4" 381467

## (undated) DEVICE — DRSG PRIMAPORE 03 1/8X6" 66000318

## (undated) DEVICE — DECANTER VIAL 2006S

## (undated) DEVICE — RX SURGIFLO HEMOSTATIC MATRIX 8ML 2991

## (undated) DEVICE — GLOVE BIOGEL PI INDICATOR 8.0 LF 41680

## (undated) DEVICE — GLOVE UNDER INDICATOR PI SZ 7.0 LF 41670

## (undated) DEVICE — SOL NACL 0.9% IRRIG 1000ML BOTTLE 2F7124

## (undated) DEVICE — Device

## (undated) DEVICE — DRAPE TOWEL IMPERVIOUS X2 7553

## (undated) DEVICE — ADH SKIN CLOSURE PREMIERPRO EXOFIN 1.0ML 3470

## (undated) DEVICE — WRAP LUMBAR COMPRESS COLD THERAPY 4632P

## (undated) DEVICE — MARKER SPHERES PASSIVE MEDT PACK 5 8801075

## (undated) DEVICE — DRAPE BACK TABLE PADDED 60X90

## (undated) DEVICE — GLOVE BIOGEL PI SZ 8.0 40880

## (undated) DEVICE — CUSTOM PACK LUMBAR FUSION SNE5BLFHEA

## (undated) DEVICE — DRSG DRAIN 4X4" 7086

## (undated) DEVICE — GOWN XLG DISP 9545

## (undated) DEVICE — PLATE GROUNDING ADULT W/CORD 9165L

## (undated) DEVICE — GOWN IMPERVIOUS BREATHABLE SMART XLG 89045

## (undated) DEVICE — TOOL DISSECT MIDAS MR8 14CM MATCH HEAD 3MM MR8-14MH30

## (undated) DEVICE — SUTURE VICRYL+ 2-0 CT-2 27" UND VCP269H

## (undated) DEVICE — SOL WATER IRRIG 1000ML BOTTLE 2F7114

## (undated) DEVICE — GLOVE BIOGEL PI SZ 7.0 40870

## (undated) DEVICE — CATH TRAY FOLEY SURESTEP 16FR DRAIN BAG STATOCK A899916

## (undated) DEVICE — SPONGE LAP 8X4IN 12 PLY RADOPQ DERMACEA STRL BLU WHT

## (undated) DEVICE — BLADE BONE MILL STRK 5.0MM MED 5400-701-000

## (undated) DEVICE — MARKER SPHERES PASSIVE MEDT PACK 1 8801071

## (undated) DEVICE — CUSHION INSERT LG PRONE VIEW JACKSON TABLE

## (undated) DEVICE — SU STRATAFIX PDS PLUS 1 CT-1 18" SXPP1A404

## (undated) DEVICE — PACK COLD 6 X 10 1-HOUR 0814-0610

## (undated) DEVICE — ESU ELEC BLADE 2.75" COATED/INSULATED E1455

## (undated) RX ORDER — PROPOFOL 10 MG/ML
INJECTION, EMULSION INTRAVENOUS
Status: DISPENSED
Start: 2023-04-27

## (undated) RX ORDER — FENTANYL CITRATE 50 UG/ML
INJECTION, SOLUTION INTRAMUSCULAR; INTRAVENOUS
Status: DISPENSED
Start: 2023-04-27

## (undated) RX ORDER — CEFAZOLIN SODIUM 1 G/3ML
INJECTION, POWDER, FOR SOLUTION INTRAMUSCULAR; INTRAVENOUS
Status: DISPENSED
Start: 2023-04-27